# Patient Record
Sex: MALE | Race: WHITE | NOT HISPANIC OR LATINO
[De-identification: names, ages, dates, MRNs, and addresses within clinical notes are randomized per-mention and may not be internally consistent; named-entity substitution may affect disease eponyms.]

---

## 2017-01-31 ENCOUNTER — NON-APPOINTMENT (OUTPATIENT)
Age: 82
End: 2017-01-31

## 2017-01-31 ENCOUNTER — APPOINTMENT (OUTPATIENT)
Dept: CARDIOLOGY | Facility: CLINIC | Age: 82
End: 2017-01-31

## 2017-01-31 VITALS
BODY MASS INDEX: 28.53 KG/M2 | WEIGHT: 161 LBS | OXYGEN SATURATION: 96 % | HEIGHT: 63 IN | SYSTOLIC BLOOD PRESSURE: 108 MMHG | HEART RATE: 62 BPM | DIASTOLIC BLOOD PRESSURE: 70 MMHG

## 2017-04-24 ENCOUNTER — NON-APPOINTMENT (OUTPATIENT)
Age: 82
End: 2017-04-24

## 2017-04-24 ENCOUNTER — APPOINTMENT (OUTPATIENT)
Dept: CARDIOLOGY | Facility: CLINIC | Age: 82
End: 2017-04-24

## 2017-04-24 VITALS
SYSTOLIC BLOOD PRESSURE: 94 MMHG | HEIGHT: 63 IN | HEART RATE: 56 BPM | OXYGEN SATURATION: 97 % | DIASTOLIC BLOOD PRESSURE: 55 MMHG

## 2017-04-24 VITALS — DIASTOLIC BLOOD PRESSURE: 60 MMHG | SYSTOLIC BLOOD PRESSURE: 100 MMHG

## 2017-05-08 ENCOUNTER — OTHER (OUTPATIENT)
Age: 82
End: 2017-05-08

## 2017-05-08 ENCOUNTER — APPOINTMENT (OUTPATIENT)
Dept: CARDIOLOGY | Facility: CLINIC | Age: 82
End: 2017-05-08

## 2017-06-21 ENCOUNTER — RX RENEWAL (OUTPATIENT)
Age: 82
End: 2017-06-21

## 2017-07-24 ENCOUNTER — MEDICATION RENEWAL (OUTPATIENT)
Age: 82
End: 2017-07-24

## 2017-07-24 ENCOUNTER — RX RENEWAL (OUTPATIENT)
Age: 82
End: 2017-07-24

## 2017-08-10 ENCOUNTER — NON-APPOINTMENT (OUTPATIENT)
Age: 82
End: 2017-08-10

## 2017-08-10 ENCOUNTER — APPOINTMENT (OUTPATIENT)
Dept: CARDIOLOGY | Facility: CLINIC | Age: 82
End: 2017-08-10
Payer: MEDICARE

## 2017-08-10 VITALS
SYSTOLIC BLOOD PRESSURE: 89 MMHG | BODY MASS INDEX: 28.7 KG/M2 | WEIGHT: 162 LBS | HEART RATE: 64 BPM | DIASTOLIC BLOOD PRESSURE: 57 MMHG | HEIGHT: 63 IN | OXYGEN SATURATION: 96 %

## 2017-08-10 PROCEDURE — 93000 ELECTROCARDIOGRAM COMPLETE: CPT

## 2017-08-10 PROCEDURE — 99215 OFFICE O/P EST HI 40 MIN: CPT

## 2017-09-25 ENCOUNTER — APPOINTMENT (OUTPATIENT)
Dept: RADIOLOGY | Facility: CLINIC | Age: 82
End: 2017-09-25
Payer: MEDICARE

## 2017-09-25 ENCOUNTER — OUTPATIENT (OUTPATIENT)
Dept: OUTPATIENT SERVICES | Facility: HOSPITAL | Age: 82
LOS: 1 days | End: 2017-09-25
Payer: MEDICARE

## 2017-09-25 DIAGNOSIS — Z00.8 ENCOUNTER FOR OTHER GENERAL EXAMINATION: ICD-10-CM

## 2017-09-25 PROCEDURE — 71020: CPT | Mod: 26

## 2017-09-25 PROCEDURE — 71046 X-RAY EXAM CHEST 2 VIEWS: CPT

## 2017-10-04 ENCOUNTER — APPOINTMENT (OUTPATIENT)
Dept: CARDIOLOGY | Facility: CLINIC | Age: 82
End: 2017-10-04
Payer: MEDICARE

## 2017-10-04 ENCOUNTER — NON-APPOINTMENT (OUTPATIENT)
Age: 82
End: 2017-10-04

## 2017-10-04 VITALS
SYSTOLIC BLOOD PRESSURE: 101 MMHG | DIASTOLIC BLOOD PRESSURE: 63 MMHG | HEIGHT: 63 IN | HEART RATE: 69 BPM | BODY MASS INDEX: 26.58 KG/M2 | WEIGHT: 150 LBS

## 2017-10-04 PROCEDURE — 99215 OFFICE O/P EST HI 40 MIN: CPT

## 2017-10-04 PROCEDURE — 93000 ELECTROCARDIOGRAM COMPLETE: CPT

## 2017-10-30 ENCOUNTER — APPOINTMENT (OUTPATIENT)
Dept: CARDIOLOGY | Facility: CLINIC | Age: 82
End: 2017-10-30
Payer: MEDICARE

## 2017-10-30 VITALS
SYSTOLIC BLOOD PRESSURE: 96 MMHG | BODY MASS INDEX: 29.59 KG/M2 | DIASTOLIC BLOOD PRESSURE: 59 MMHG | OXYGEN SATURATION: 97 % | WEIGHT: 167 LBS | HEART RATE: 73 BPM | HEIGHT: 63 IN

## 2017-10-30 VITALS — SYSTOLIC BLOOD PRESSURE: 110 MMHG | DIASTOLIC BLOOD PRESSURE: 60 MMHG

## 2017-10-30 PROCEDURE — 99215 OFFICE O/P EST HI 40 MIN: CPT

## 2017-11-27 ENCOUNTER — NON-APPOINTMENT (OUTPATIENT)
Age: 82
End: 2017-11-27

## 2017-11-27 ENCOUNTER — APPOINTMENT (OUTPATIENT)
Dept: CARDIOLOGY | Facility: CLINIC | Age: 82
End: 2017-11-27
Payer: MEDICARE

## 2017-11-27 VITALS
WEIGHT: 165 LBS | HEIGHT: 63 IN | DIASTOLIC BLOOD PRESSURE: 62 MMHG | SYSTOLIC BLOOD PRESSURE: 100 MMHG | OXYGEN SATURATION: 97 % | HEART RATE: 66 BPM | BODY MASS INDEX: 29.23 KG/M2

## 2017-11-27 PROCEDURE — 93000 ELECTROCARDIOGRAM COMPLETE: CPT

## 2017-11-27 PROCEDURE — 99215 OFFICE O/P EST HI 40 MIN: CPT

## 2017-12-08 ENCOUNTER — APPOINTMENT (OUTPATIENT)
Dept: INTERNAL MEDICINE | Facility: CLINIC | Age: 82
End: 2017-12-08
Payer: MEDICARE

## 2017-12-08 VITALS
BODY MASS INDEX: 29.23 KG/M2 | WEIGHT: 165 LBS | RESPIRATION RATE: 14 BRPM | SYSTOLIC BLOOD PRESSURE: 120 MMHG | TEMPERATURE: 98 F | DIASTOLIC BLOOD PRESSURE: 50 MMHG | OXYGEN SATURATION: 96 % | HEIGHT: 63 IN | HEART RATE: 86 BPM

## 2017-12-08 DIAGNOSIS — I95.89 OTHER HYPOTENSION: ICD-10-CM

## 2017-12-08 PROCEDURE — 99214 OFFICE O/P EST MOD 30 MIN: CPT

## 2017-12-09 LAB
25(OH)D3 SERPL-MCNC: 28.4 NG/ML
ALBUMIN SERPL ELPH-MCNC: 3.9 G/DL
ALP BLD-CCNC: 115 U/L
ALT SERPL-CCNC: 10 U/L
ANION GAP SERPL CALC-SCNC: 15 MMOL/L
AST SERPL-CCNC: 18 U/L
BASOPHILS # BLD AUTO: 0.01 K/UL
BASOPHILS NFR BLD AUTO: 0.1 %
BILIRUB SERPL-MCNC: 0.5 MG/DL
BUN SERPL-MCNC: 37 MG/DL
CALCIUM SERPL-MCNC: 9.8 MG/DL
CHLORIDE SERPL-SCNC: 101 MMOL/L
CHOLEST SERPL-MCNC: 111 MG/DL
CHOLEST/HDLC SERPL: 2.5 RATIO
CO2 SERPL-SCNC: 25 MMOL/L
CREAT SERPL-MCNC: 1.42 MG/DL
EOSINOPHIL # BLD AUTO: 0.19 K/UL
EOSINOPHIL NFR BLD AUTO: 2.4
FERRITIN SERPL-MCNC: 32 NG/ML
FOLATE SERPL-MCNC: 11.5 NG/ML
GLUCOSE SERPL-MCNC: 94 MG/DL
HBA1C MFR BLD HPLC: 5.5 %
HCT VFR BLD CALC: 37.1 %
HDLC SERPL-MCNC: 44 MG/DL
HGB BLD-MCNC: 11.9 G/DL
IMM GRANULOCYTES NFR BLD AUTO: 0.1 %
IRON SATN MFR SERPL: 19 %
IRON SERPL-MCNC: 61 UG/DL
LDLC SERPL CALC-MCNC: 43 MG/DL
LYMPHOCYTES # BLD AUTO: 1.38 K/UL
LYMPHOCYTES NFR BLD AUTO: 17.1 %
MAN DIFF?: NORMAL
MCHC RBC-ENTMCNC: 30.3 PG
MCHC RBC-ENTMCNC: 32.1 GM/DL
MCV RBC AUTO: 94.4 FL
MONOCYTES # BLD AUTO: 0.91 K/UL
MONOCYTES NFR BLD AUTO: 11.3 %
NEUTROPHILS # BLD AUTO: 5.58 K/UL
NEUTROPHILS NFR BLD AUTO: 69 %
PLATELET # BLD AUTO: 307 K/UL
POTASSIUM SERPL-SCNC: 5 MMOL/L
PROT SERPL-MCNC: 7.3 G/DL
RBC # BLD: 3.93 M/UL
RBC # FLD: 15.2 %
SODIUM SERPL-SCNC: 141 MMOL/L
TIBC SERPL-MCNC: 328 UG/DL
TRIGL SERPL-MCNC: 121 MG/DL
TSH SERPL-ACNC: 2.52 UIU/ML
UIBC SERPL-MCNC: 267 UG/DL
VIT B12 SERPL-MCNC: 448 PG/ML
WBC # FLD AUTO: 8.08 K/UL

## 2018-01-26 ENCOUNTER — APPOINTMENT (OUTPATIENT)
Dept: CARDIOLOGY | Facility: CLINIC | Age: 83
End: 2018-01-26
Payer: MEDICARE

## 2018-01-26 ENCOUNTER — NON-APPOINTMENT (OUTPATIENT)
Age: 83
End: 2018-01-26

## 2018-01-26 VITALS
SYSTOLIC BLOOD PRESSURE: 117 MMHG | BODY MASS INDEX: 29.06 KG/M2 | OXYGEN SATURATION: 83 % | HEART RATE: 127 BPM | HEIGHT: 63 IN | DIASTOLIC BLOOD PRESSURE: 64 MMHG | WEIGHT: 164 LBS

## 2018-01-26 PROCEDURE — 99215 OFFICE O/P EST HI 40 MIN: CPT

## 2018-01-26 PROCEDURE — 93000 ELECTROCARDIOGRAM COMPLETE: CPT

## 2018-01-31 ENCOUNTER — APPOINTMENT (OUTPATIENT)
Dept: CARDIOLOGY | Facility: CLINIC | Age: 83
End: 2018-01-31
Payer: MEDICARE

## 2018-01-31 ENCOUNTER — NON-APPOINTMENT (OUTPATIENT)
Age: 83
End: 2018-01-31

## 2018-01-31 VITALS
OXYGEN SATURATION: 94 % | HEART RATE: 69 BPM | WEIGHT: 166 LBS | BODY MASS INDEX: 29.41 KG/M2 | DIASTOLIC BLOOD PRESSURE: 81 MMHG | HEIGHT: 63 IN | SYSTOLIC BLOOD PRESSURE: 138 MMHG

## 2018-01-31 PROCEDURE — 93000 ELECTROCARDIOGRAM COMPLETE: CPT

## 2018-01-31 PROCEDURE — 99214 OFFICE O/P EST MOD 30 MIN: CPT

## 2018-03-14 ENCOUNTER — APPOINTMENT (OUTPATIENT)
Dept: INTERNAL MEDICINE | Facility: CLINIC | Age: 83
End: 2018-03-14
Payer: MEDICARE

## 2018-03-14 VITALS
RESPIRATION RATE: 14 BRPM | DIASTOLIC BLOOD PRESSURE: 68 MMHG | BODY MASS INDEX: 29.41 KG/M2 | OXYGEN SATURATION: 98 % | HEIGHT: 63 IN | TEMPERATURE: 97.3 F | HEART RATE: 64 BPM | SYSTOLIC BLOOD PRESSURE: 104 MMHG | WEIGHT: 166 LBS

## 2018-03-14 PROCEDURE — G0009: CPT

## 2018-03-14 PROCEDURE — 99214 OFFICE O/P EST MOD 30 MIN: CPT | Mod: 25

## 2018-03-14 PROCEDURE — 90670 PCV13 VACCINE IM: CPT

## 2018-03-14 RX ORDER — ATORVASTATIN CALCIUM 40 MG/1
40 TABLET, FILM COATED ORAL DAILY
Qty: 90 | Refills: 3 | Status: DISCONTINUED | COMMUNITY
Start: 2017-07-24 | End: 2018-03-14

## 2018-03-15 LAB
ALBUMIN SERPL ELPH-MCNC: 3.7 G/DL
ALP BLD-CCNC: 91 U/L
ALT SERPL-CCNC: 8 U/L
ANION GAP SERPL CALC-SCNC: 14 MMOL/L
AST SERPL-CCNC: 13 U/L
BASOPHILS # BLD AUTO: 0 K/UL
BASOPHILS NFR BLD AUTO: 0 %
BILIRUB SERPL-MCNC: 0.5 MG/DL
BUN SERPL-MCNC: 35 MG/DL
CALCIUM SERPL-MCNC: 9.8 MG/DL
CHLORIDE SERPL-SCNC: 104 MMOL/L
CO2 SERPL-SCNC: 25 MMOL/L
CREAT SERPL-MCNC: 1.68 MG/DL
EOSINOPHIL # BLD AUTO: 0 K/UL
EOSINOPHIL NFR BLD AUTO: 0 %
FERRITIN SERPL-MCNC: 37 NG/ML
FOLATE SERPL-MCNC: 13.5 NG/ML
GLUCOSE SERPL-MCNC: 103 MG/DL
HCT VFR BLD CALC: 34.4 %
HGB BLD-MCNC: 11.3 G/DL
IMM GRANULOCYTES NFR BLD AUTO: 0.3 %
IRON SATN MFR SERPL: 18 %
IRON SERPL-MCNC: 57 UG/DL
LYMPHOCYTES # BLD AUTO: 1.38 K/UL
LYMPHOCYTES NFR BLD AUTO: 22 %
MAN DIFF?: NORMAL
MCHC RBC-ENTMCNC: 31.1 PG
MCHC RBC-ENTMCNC: 32.8 GM/DL
MCV RBC AUTO: 94.8 FL
MONOCYTES # BLD AUTO: 0.67 K/UL
MONOCYTES NFR BLD AUTO: 10.7 %
NEUTROPHILS # BLD AUTO: 4.2 K/UL
NEUTROPHILS NFR BLD AUTO: 67 %
PLATELET # BLD AUTO: 238 K/UL
POTASSIUM SERPL-SCNC: 4.8 MMOL/L
PROT SERPL-MCNC: 6.7 G/DL
RBC # BLD: 3.63 M/UL
RBC # FLD: 15.7 %
SODIUM SERPL-SCNC: 143 MMOL/L
TIBC SERPL-MCNC: 325 UG/DL
UIBC SERPL-MCNC: 268 UG/DL
VIT B12 SERPL-MCNC: 465 PG/ML
WBC # FLD AUTO: 6.27 K/UL

## 2018-05-22 ENCOUNTER — APPOINTMENT (OUTPATIENT)
Dept: CARDIOLOGY | Facility: CLINIC | Age: 83
End: 2018-05-22
Payer: MEDICARE

## 2018-05-22 ENCOUNTER — NON-APPOINTMENT (OUTPATIENT)
Age: 83
End: 2018-05-22

## 2018-05-22 VITALS
WEIGHT: 181 LBS | DIASTOLIC BLOOD PRESSURE: 70 MMHG | HEIGHT: 63 IN | SYSTOLIC BLOOD PRESSURE: 119 MMHG | OXYGEN SATURATION: 97 % | HEART RATE: 67 BPM | BODY MASS INDEX: 32.07 KG/M2

## 2018-05-22 PROCEDURE — 93000 ELECTROCARDIOGRAM COMPLETE: CPT

## 2018-05-22 PROCEDURE — 99215 OFFICE O/P EST HI 40 MIN: CPT

## 2018-06-13 ENCOUNTER — APPOINTMENT (OUTPATIENT)
Dept: INTERNAL MEDICINE | Facility: CLINIC | Age: 83
End: 2018-06-13
Payer: MEDICARE

## 2018-06-13 VITALS
TEMPERATURE: 98 F | HEART RATE: 72 BPM | WEIGHT: 170 LBS | RESPIRATION RATE: 14 BRPM | OXYGEN SATURATION: 96 % | HEIGHT: 63 IN | DIASTOLIC BLOOD PRESSURE: 70 MMHG | SYSTOLIC BLOOD PRESSURE: 130 MMHG | BODY MASS INDEX: 30.12 KG/M2

## 2018-06-13 DIAGNOSIS — M41.9 SCOLIOSIS, UNSPECIFIED: ICD-10-CM

## 2018-06-13 DIAGNOSIS — M48.00 SPINAL STENOSIS, SITE UNSPECIFIED: ICD-10-CM

## 2018-06-13 PROCEDURE — 99214 OFFICE O/P EST MOD 30 MIN: CPT

## 2018-06-13 NOTE — HEALTH RISK ASSESSMENT
[] : No [Two or more falls in past year] : Patient reported two or more falls in the past year [0] : 2) Feeling down, depressed, or hopeless: Not at all (0)

## 2018-06-13 NOTE — PHYSICAL EXAM
[No Acute Distress] : no acute distress [Well Nourished] : well nourished [Well Developed] : well developed [No Respiratory Distress] : no respiratory distress  [Clear to Auscultation] : lungs were clear to auscultation bilaterally [Normal Rate] : normal rate  [Regular Rhythm] : with a regular rhythm [Soft] : abdomen soft [Non Tender] : non-tender [Normal Affect] : the affect was normal [Normal Insight/Judgement] : insight and judgment were intact [de-identified] : 1-2+ edema of both LE's [de-identified] : abrasions to right hand and back of right forearm [de-identified] : sitting in wheelchair

## 2018-06-13 NOTE — HISTORY OF PRESENT ILLNESS
[Formal Caregiver] : formal caregiver [FreeTextEntry1] : BP check [de-identified] : Pt fell last week when he got up out of a chair to go to bed. He was turning to put on light and fell onto buttocks and right arm getting a rug burn. \par \par He has renal lesions and was last seen 4 yrs ago. Told no further monitoring needed. \par \par Has MCCARTY sometimes and can happen when he bends down. Has chronic leg edema. \par \par Has scoliosis and spinal stenosis with chronic pain.

## 2018-06-26 PROBLEM — I50.22 CHRONIC SYSTOLIC HEART FAILURE: Status: RESOLVED | Noted: 2018-06-26 | Resolved: 2018-06-26

## 2018-06-26 PROBLEM — N28.9 RENAL INSUFFICIENCY: Status: ACTIVE | Noted: 2018-06-26

## 2018-06-26 PROBLEM — J96.11 CHRONIC RESPIRATORY FAILURE WITH HYPOXIA: Status: RESOLVED | Noted: 2018-06-26 | Resolved: 2018-06-26

## 2018-06-26 RX ORDER — UBIDECARENONE/VIT E ACET 100MG-5
1000 CAPSULE ORAL
Refills: 0 | Status: ACTIVE | COMMUNITY

## 2018-07-02 ENCOUNTER — APPOINTMENT (OUTPATIENT)
Dept: CARDIOLOGY | Facility: CLINIC | Age: 83
End: 2018-07-02
Payer: MEDICARE

## 2018-07-02 ENCOUNTER — NON-APPOINTMENT (OUTPATIENT)
Age: 83
End: 2018-07-02

## 2018-07-02 VITALS
SYSTOLIC BLOOD PRESSURE: 103 MMHG | BODY MASS INDEX: 29.77 KG/M2 | WEIGHT: 168 LBS | OXYGEN SATURATION: 94 % | HEART RATE: 66 BPM | DIASTOLIC BLOOD PRESSURE: 66 MMHG | HEIGHT: 63 IN

## 2018-07-02 DIAGNOSIS — N28.9 DISORDER OF KIDNEY AND URETER, UNSPECIFIED: ICD-10-CM

## 2018-07-02 DIAGNOSIS — J96.11 CHRONIC RESPIRATORY FAILURE WITH HYPOXIA: ICD-10-CM

## 2018-07-02 DIAGNOSIS — I50.22 CHRONIC SYSTOLIC (CONGESTIVE) HEART FAILURE: ICD-10-CM

## 2018-07-02 PROCEDURE — 93000 ELECTROCARDIOGRAM COMPLETE: CPT

## 2018-07-02 PROCEDURE — 99214 OFFICE O/P EST MOD 30 MIN: CPT

## 2018-07-09 ENCOUNTER — APPOINTMENT (OUTPATIENT)
Dept: PULMONOLOGY | Facility: CLINIC | Age: 83
End: 2018-07-09
Payer: MEDICARE

## 2018-07-09 VITALS
SYSTOLIC BLOOD PRESSURE: 102 MMHG | OXYGEN SATURATION: 98 % | RESPIRATION RATE: 14 BRPM | DIASTOLIC BLOOD PRESSURE: 61 MMHG | WEIGHT: 165 LBS | HEIGHT: 63 IN | BODY MASS INDEX: 29.23 KG/M2 | HEART RATE: 75 BPM

## 2018-07-09 PROCEDURE — 99213 OFFICE O/P EST LOW 20 MIN: CPT

## 2018-07-12 ENCOUNTER — RX RENEWAL (OUTPATIENT)
Age: 83
End: 2018-07-12

## 2018-08-06 ENCOUNTER — APPOINTMENT (OUTPATIENT)
Dept: PULMONOLOGY | Facility: CLINIC | Age: 83
End: 2018-08-06

## 2018-08-13 ENCOUNTER — APPOINTMENT (OUTPATIENT)
Dept: PULMONOLOGY | Facility: CLINIC | Age: 83
End: 2018-08-13
Payer: MEDICARE

## 2018-08-13 VITALS
SYSTOLIC BLOOD PRESSURE: 119 MMHG | HEIGHT: 64 IN | BODY MASS INDEX: 28.17 KG/M2 | HEART RATE: 59 BPM | DIASTOLIC BLOOD PRESSURE: 70 MMHG | OXYGEN SATURATION: 99 % | WEIGHT: 165 LBS

## 2018-08-13 PROCEDURE — 94726 PLETHYSMOGRAPHY LUNG VOLUMES: CPT

## 2018-08-13 PROCEDURE — 94729 DIFFUSING CAPACITY: CPT

## 2018-08-13 PROCEDURE — 99213 OFFICE O/P EST LOW 20 MIN: CPT | Mod: 25

## 2018-08-13 PROCEDURE — 94010 BREATHING CAPACITY TEST: CPT

## 2018-08-23 ENCOUNTER — RECORD ABSTRACTING (OUTPATIENT)
Age: 83
End: 2018-08-23

## 2018-09-12 ENCOUNTER — APPOINTMENT (OUTPATIENT)
Dept: INTERNAL MEDICINE | Facility: CLINIC | Age: 83
End: 2018-09-12
Payer: MEDICARE

## 2018-09-12 VITALS
WEIGHT: 165 LBS | BODY MASS INDEX: 28.17 KG/M2 | OXYGEN SATURATION: 94 % | HEIGHT: 64 IN | HEART RATE: 67 BPM | SYSTOLIC BLOOD PRESSURE: 110 MMHG | DIASTOLIC BLOOD PRESSURE: 60 MMHG | RESPIRATION RATE: 14 BRPM | TEMPERATURE: 98 F

## 2018-09-12 PROCEDURE — 99214 OFFICE O/P EST MOD 30 MIN: CPT

## 2018-09-12 NOTE — HISTORY OF PRESENT ILLNESS
[FreeTextEntry1] : BP check and f/u [de-identified] : Here for follow up. Has not had anymore falls. \par c/o occasional pain in right out thigh from hip to knee. Using cream with ibuprofen which helps. His daughter gave it to him.He has used voltaren cream in the past and doesn't want that. His right leg gives out and collapses once in a while. \par c/o urge incontinence which has gotten worse over the last year.

## 2018-09-12 NOTE — PHYSICAL EXAM
[No Acute Distress] : no acute distress [Well Nourished] : well nourished [Well Developed] : well developed [No Respiratory Distress] : no respiratory distress  [Clear to Auscultation] : lungs were clear to auscultation bilaterally [Normal Rate] : normal rate  [Regular Rhythm] : with a regular rhythm [Soft] : abdomen soft [Non Tender] : non-tender [Normal Affect] : the affect was normal [Normal Insight/Judgement] : insight and judgment were intact [de-identified] : sitting in wheel chair [de-identified] : has oxygen [de-identified] : ankle edema L > R

## 2018-09-12 NOTE — REVIEW OF SYSTEMS
[Shortness Of Breath] : shortness of breath [Negative] : Heme/Lymph [FreeTextEntry9] : sitting in wheel chair

## 2018-09-13 DIAGNOSIS — R74.0 NONSPECIFIC ELEVATION OF LEVELS OF TRANSAMINASE AND LACTIC ACID DEHYDROGENASE [LDH]: ICD-10-CM

## 2018-09-13 LAB
25(OH)D3 SERPL-MCNC: 35 NG/ML
ALBUMIN SERPL ELPH-MCNC: 3.9 G/DL
ALP BLD-CCNC: 128 U/L
ALT SERPL-CCNC: 74 U/L
ANION GAP SERPL CALC-SCNC: 11 MMOL/L
AST SERPL-CCNC: 76 U/L
BASOPHILS # BLD AUTO: 0.01 K/UL
BASOPHILS NFR BLD AUTO: 0.1 %
BILIRUB SERPL-MCNC: 0.5 MG/DL
BUN SERPL-MCNC: 34 MG/DL
CALCIUM SERPL-MCNC: 9.3 MG/DL
CHLORIDE SERPL-SCNC: 105 MMOL/L
CHOLEST SERPL-MCNC: 93 MG/DL
CHOLEST/HDLC SERPL: 2.7 RATIO
CO2 SERPL-SCNC: 26 MMOL/L
CREAT SERPL-MCNC: 1.52 MG/DL
EOSINOPHIL # BLD AUTO: 0.06 K/UL
EOSINOPHIL NFR BLD AUTO: 0.7 %
FOLATE SERPL-MCNC: 10.8 NG/ML
GLUCOSE SERPL-MCNC: 108 MG/DL
HBA1C MFR BLD HPLC: 5.6 %
HCT VFR BLD CALC: 33.8 %
HDLC SERPL-MCNC: 34 MG/DL
HGB BLD-MCNC: 10.6 G/DL
IMM GRANULOCYTES NFR BLD AUTO: 0.2 %
LDLC SERPL CALC-MCNC: 34 MG/DL
LYMPHOCYTES # BLD AUTO: 1.29 K/UL
LYMPHOCYTES NFR BLD AUTO: 15.9 %
MAN DIFF?: NORMAL
MCHC RBC-ENTMCNC: 29.9 PG
MCHC RBC-ENTMCNC: 31.4 GM/DL
MCV RBC AUTO: 95.5 FL
MONOCYTES # BLD AUTO: 1.09 K/UL
MONOCYTES NFR BLD AUTO: 13.4 %
NEUTROPHILS # BLD AUTO: 5.65 K/UL
NEUTROPHILS NFR BLD AUTO: 69.7 %
PLATELET # BLD AUTO: 235 K/UL
POTASSIUM SERPL-SCNC: 5 MMOL/L
PROT SERPL-MCNC: 7 G/DL
RBC # BLD: 3.54 M/UL
RBC # FLD: 15.9 %
SODIUM SERPL-SCNC: 142 MMOL/L
TRIGL SERPL-MCNC: 126 MG/DL
TSH SERPL-ACNC: 3.08 UIU/ML
URATE SERPL-MCNC: 6.1 MG/DL
VIT B12 SERPL-MCNC: 591 PG/ML
WBC # FLD AUTO: 8.12 K/UL

## 2018-09-14 LAB
APPEARANCE: CLEAR
BILIRUBIN URINE: NEGATIVE
BLOOD URINE: NEGATIVE
COLOR: YELLOW
GLUCOSE QUALITATIVE U: NEGATIVE MG/DL
KETONES URINE: NEGATIVE
LEUKOCYTE ESTERASE URINE: NEGATIVE
NITRITE URINE: NEGATIVE
PH URINE: 6
PROTEIN URINE: NEGATIVE MG/DL
SPECIFIC GRAVITY URINE: 1.01
UROBILINOGEN URINE: NEGATIVE MG/DL

## 2018-09-16 LAB — BACTERIA UR CULT: NORMAL

## 2018-09-24 ENCOUNTER — NON-APPOINTMENT (OUTPATIENT)
Age: 83
End: 2018-09-24

## 2018-09-24 ENCOUNTER — APPOINTMENT (OUTPATIENT)
Dept: CARDIOLOGY | Facility: CLINIC | Age: 83
End: 2018-09-24
Payer: MEDICARE

## 2018-09-24 ENCOUNTER — APPOINTMENT (OUTPATIENT)
Dept: PULMONOLOGY | Facility: CLINIC | Age: 83
End: 2018-09-24
Payer: MEDICARE

## 2018-09-24 VITALS
DIASTOLIC BLOOD PRESSURE: 56 MMHG | RESPIRATION RATE: 16 BRPM | HEART RATE: 67 BPM | OXYGEN SATURATION: 95 % | SYSTOLIC BLOOD PRESSURE: 94 MMHG

## 2018-09-24 VITALS
HEIGHT: 64 IN | DIASTOLIC BLOOD PRESSURE: 74 MMHG | SYSTOLIC BLOOD PRESSURE: 121 MMHG | BODY MASS INDEX: 28.68 KG/M2 | HEART RATE: 61 BPM | WEIGHT: 168 LBS | OXYGEN SATURATION: 96 %

## 2018-09-24 DIAGNOSIS — R06.02 SHORTNESS OF BREATH: ICD-10-CM

## 2018-09-24 PROCEDURE — 99215 OFFICE O/P EST HI 40 MIN: CPT

## 2018-09-24 PROCEDURE — 99214 OFFICE O/P EST MOD 30 MIN: CPT | Mod: 25

## 2018-09-24 PROCEDURE — 93000 ELECTROCARDIOGRAM COMPLETE: CPT

## 2018-09-24 PROCEDURE — 94010 BREATHING CAPACITY TEST: CPT

## 2018-09-24 PROCEDURE — 71046 X-RAY EXAM CHEST 2 VIEWS: CPT

## 2018-10-22 ENCOUNTER — APPOINTMENT (OUTPATIENT)
Dept: PULMONOLOGY | Facility: CLINIC | Age: 83
End: 2018-10-22
Payer: MEDICARE

## 2018-10-22 VITALS
HEART RATE: 67 BPM | SYSTOLIC BLOOD PRESSURE: 110 MMHG | HEIGHT: 64 IN | RESPIRATION RATE: 16 BRPM | OXYGEN SATURATION: 96 % | BODY MASS INDEX: 28.68 KG/M2 | WEIGHT: 168 LBS | DIASTOLIC BLOOD PRESSURE: 65 MMHG

## 2018-10-22 PROCEDURE — 99213 OFFICE O/P EST LOW 20 MIN: CPT | Mod: 25

## 2018-10-22 PROCEDURE — 90662 IIV NO PRSV INCREASED AG IM: CPT

## 2018-10-22 PROCEDURE — 94010 BREATHING CAPACITY TEST: CPT

## 2018-10-22 PROCEDURE — G0008: CPT

## 2018-10-24 ENCOUNTER — APPOINTMENT (OUTPATIENT)
Dept: CARDIOLOGY | Facility: CLINIC | Age: 83
End: 2018-10-24
Payer: MEDICARE

## 2018-10-24 ENCOUNTER — NON-APPOINTMENT (OUTPATIENT)
Age: 83
End: 2018-10-24

## 2018-10-24 PROCEDURE — 99214 OFFICE O/P EST MOD 30 MIN: CPT

## 2018-10-24 PROCEDURE — 93000 ELECTROCARDIOGRAM COMPLETE: CPT

## 2018-10-29 ENCOUNTER — MEDICATION RENEWAL (OUTPATIENT)
Age: 83
End: 2018-10-29

## 2018-10-30 ENCOUNTER — RX RENEWAL (OUTPATIENT)
Age: 83
End: 2018-10-30

## 2018-10-31 ENCOUNTER — RX RENEWAL (OUTPATIENT)
Age: 83
End: 2018-10-31

## 2018-11-01 ENCOUNTER — APPOINTMENT (OUTPATIENT)
Dept: INTERNAL MEDICINE | Facility: CLINIC | Age: 83
End: 2018-11-01
Payer: MEDICARE

## 2018-11-01 VITALS
SYSTOLIC BLOOD PRESSURE: 120 MMHG | TEMPERATURE: 97.5 F | HEIGHT: 64 IN | OXYGEN SATURATION: 98 % | HEART RATE: 61 BPM | WEIGHT: 168 LBS | DIASTOLIC BLOOD PRESSURE: 70 MMHG | BODY MASS INDEX: 28.68 KG/M2 | RESPIRATION RATE: 16 BRPM

## 2018-11-01 VITALS — DIASTOLIC BLOOD PRESSURE: 76 MMHG | SYSTOLIC BLOOD PRESSURE: 110 MMHG

## 2018-11-01 DIAGNOSIS — Z00.00 ENCOUNTER FOR GENERAL ADULT MEDICAL EXAMINATION W/OUT ABNORMAL FINDINGS: ICD-10-CM

## 2018-11-01 DIAGNOSIS — R60.9 EDEMA, UNSPECIFIED: ICD-10-CM

## 2018-11-01 PROCEDURE — G0439: CPT

## 2018-11-01 RX ORDER — ATORVASTATIN CALCIUM 40 MG/1
40 TABLET, FILM COATED ORAL
Refills: 0 | Status: DISCONTINUED | COMMUNITY
End: 2018-11-01

## 2018-11-01 NOTE — HISTORY OF PRESENT ILLNESS
[FreeTextEntry1] : cpe [de-identified] : Pt is here for cpe.  His BP at home runs high and is good in the office.\par \par He is fasting today. \par \par Wears oxygen.

## 2018-11-01 NOTE — PHYSICAL EXAM
[No Acute Distress] : no acute distress [Well Nourished] : well nourished [Well Developed] : well developed [Well-Appearing] : well-appearing [Normal Sclera/Conjunctiva] : normal sclera/conjunctiva [PERRL] : pupils equal round and reactive to light [EOMI] : extraocular movements intact [Normal Outer Ear/Nose] : the outer ears and nose were normal in appearance [Normal Oropharynx] : the oropharynx was normal [No JVD] : no jugular venous distention [Supple] : supple [No Lymphadenopathy] : no lymphadenopathy [Thyroid Normal, No Nodules] : the thyroid was normal and there were no nodules present [No Respiratory Distress] : no respiratory distress  [Clear to Auscultation] : lungs were clear to auscultation bilaterally [No Accessory Muscle Use] : no accessory muscle use [Normal Rate] : normal rate  [Regular Rhythm] : with a regular rhythm [Normal S1, S2] : normal S1 and S2 [Soft] : abdomen soft [Non Tender] : non-tender [Normal Affect] : the affect was normal [Normal Insight/Judgement] : insight and judgment were intact [de-identified] : sitting in wheel chair [de-identified] : 1-2 + edema LE's [de-identified] : lesion on upper right scalp - scab with raised borders

## 2018-11-02 LAB
25(OH)D3 SERPL-MCNC: 34.8 NG/ML
ALBUMIN SERPL ELPH-MCNC: 4.3 G/DL
ALP BLD-CCNC: 117 U/L
ALT SERPL-CCNC: 11 U/L
ANION GAP SERPL CALC-SCNC: 12 MMOL/L
AST SERPL-CCNC: 19 U/L
BASOPHILS # BLD AUTO: 0.01 K/UL
BASOPHILS NFR BLD AUTO: 0.1 %
BILIRUB SERPL-MCNC: 0.6 MG/DL
BUN SERPL-MCNC: 33 MG/DL
CALCIUM SERPL-MCNC: 9.4 MG/DL
CHLORIDE SERPL-SCNC: 103 MMOL/L
CHOLEST SERPL-MCNC: 103 MG/DL
CHOLEST/HDLC SERPL: 2.6 RATIO
CO2 SERPL-SCNC: 28 MMOL/L
CREAT SERPL-MCNC: 1.53 MG/DL
EOSINOPHIL # BLD AUTO: 0.08 K/UL
EOSINOPHIL NFR BLD AUTO: 1.2 %
FERRITIN SERPL-MCNC: 33 NG/ML
FOLATE SERPL-MCNC: 13.7 NG/ML
GLUCOSE SERPL-MCNC: 102 MG/DL
HBA1C MFR BLD HPLC: 5.6 %
HBV SURFACE AB SER QL: NONREACTIVE
HBV SURFACE AG SER QL: NONREACTIVE
HCT VFR BLD CALC: 35.6 %
HCV AB SER QL: NONREACTIVE
HCV S/CO RATIO: 0.1 S/CO
HDLC SERPL-MCNC: 39 MG/DL
HGB BLD-MCNC: 11.3 G/DL
IMM GRANULOCYTES NFR BLD AUTO: 0.3 %
LDLC SERPL CALC-MCNC: 43 MG/DL
LYMPHOCYTES # BLD AUTO: 1.45 K/UL
LYMPHOCYTES NFR BLD AUTO: 21.7 %
MAN DIFF?: NORMAL
MCHC RBC-ENTMCNC: 30.5 PG
MCHC RBC-ENTMCNC: 31.7 GM/DL
MCV RBC AUTO: 96.2 FL
MONOCYTES # BLD AUTO: 0.82 K/UL
MONOCYTES NFR BLD AUTO: 12.3 %
NEUTROPHILS # BLD AUTO: 4.31 K/UL
NEUTROPHILS NFR BLD AUTO: 64.4 %
PLATELET # BLD AUTO: 222 K/UL
POTASSIUM SERPL-SCNC: 5 MMOL/L
PROT SERPL-MCNC: 7.2 G/DL
RBC # BLD: 3.7 M/UL
RBC # BLD: 3.7 M/UL
RBC # FLD: 15 %
RETICS # AUTO: 1.8 %
RETICS AGGREG/RBC NFR: 66.6 K/UL
SODIUM SERPL-SCNC: 143 MMOL/L
TRIGL SERPL-MCNC: 107 MG/DL
TSH SERPL-ACNC: 3.21 UIU/ML
URATE SERPL-MCNC: 5.6 MG/DL
VIT B12 SERPL-MCNC: 479 PG/ML
WBC # FLD AUTO: 6.69 K/UL

## 2018-11-26 ENCOUNTER — APPOINTMENT (OUTPATIENT)
Dept: PULMONOLOGY | Facility: CLINIC | Age: 83
End: 2018-11-26
Payer: MEDICARE

## 2018-11-26 VITALS — SYSTOLIC BLOOD PRESSURE: 105 MMHG | DIASTOLIC BLOOD PRESSURE: 64 MMHG

## 2018-11-26 VITALS
HEART RATE: 76 BPM | BODY MASS INDEX: 28.17 KG/M2 | DIASTOLIC BLOOD PRESSURE: 60 MMHG | WEIGHT: 165 LBS | SYSTOLIC BLOOD PRESSURE: 94 MMHG | OXYGEN SATURATION: 98 % | HEIGHT: 64 IN | RESPIRATION RATE: 14 BRPM

## 2018-11-26 PROCEDURE — 99213 OFFICE O/P EST LOW 20 MIN: CPT | Mod: 25

## 2018-11-26 PROCEDURE — 94010 BREATHING CAPACITY TEST: CPT

## 2018-12-17 ENCOUNTER — RX RENEWAL (OUTPATIENT)
Age: 83
End: 2018-12-17

## 2018-12-18 ENCOUNTER — RX RENEWAL (OUTPATIENT)
Age: 83
End: 2018-12-18

## 2019-01-01 ENCOUNTER — RX RENEWAL (OUTPATIENT)
Age: 84
End: 2019-01-01

## 2019-01-01 ENCOUNTER — APPOINTMENT (OUTPATIENT)
Dept: INTERNAL MEDICINE | Facility: CLINIC | Age: 84
End: 2019-01-01
Payer: MEDICARE

## 2019-01-01 ENCOUNTER — APPOINTMENT (OUTPATIENT)
Dept: DERMATOLOGY | Facility: CLINIC | Age: 84
End: 2019-01-01
Payer: MEDICARE

## 2019-01-01 ENCOUNTER — APPOINTMENT (OUTPATIENT)
Dept: PULMONOLOGY | Facility: CLINIC | Age: 84
End: 2019-01-01
Payer: MEDICARE

## 2019-01-01 ENCOUNTER — APPOINTMENT (OUTPATIENT)
Dept: CARDIOLOGY | Facility: CLINIC | Age: 84
End: 2019-01-01
Payer: MEDICARE

## 2019-01-01 ENCOUNTER — NON-APPOINTMENT (OUTPATIENT)
Age: 84
End: 2019-01-01

## 2019-01-01 ENCOUNTER — APPOINTMENT (OUTPATIENT)
Dept: INTERNAL MEDICINE | Facility: CLINIC | Age: 84
End: 2019-01-01

## 2019-01-01 ENCOUNTER — APPOINTMENT (OUTPATIENT)
Dept: DERMATOLOGY | Facility: CLINIC | Age: 84
End: 2019-01-01

## 2019-01-01 ENCOUNTER — TRANSCRIPTION ENCOUNTER (OUTPATIENT)
Age: 84
End: 2019-01-01

## 2019-01-01 VITALS
HEART RATE: 70 BPM | DIASTOLIC BLOOD PRESSURE: 80 MMHG | RESPIRATION RATE: 14 BRPM | WEIGHT: 160 LBS | SYSTOLIC BLOOD PRESSURE: 130 MMHG | OXYGEN SATURATION: 94 % | HEIGHT: 65 IN | BODY MASS INDEX: 26.66 KG/M2 | TEMPERATURE: 97.1 F

## 2019-01-01 VITALS
DIASTOLIC BLOOD PRESSURE: 67 MMHG | SYSTOLIC BLOOD PRESSURE: 117 MMHG | HEIGHT: 65 IN | OXYGEN SATURATION: 97 % | TEMPERATURE: 98.3 F | HEART RATE: 61 BPM | WEIGHT: 160 LBS | BODY MASS INDEX: 26.66 KG/M2 | RESPIRATION RATE: 16 BRPM

## 2019-01-01 VITALS
HEIGHT: 64 IN | SYSTOLIC BLOOD PRESSURE: 153 MMHG | HEART RATE: 63 BPM | OXYGEN SATURATION: 100 % | WEIGHT: 160 LBS | BODY MASS INDEX: 27.31 KG/M2 | DIASTOLIC BLOOD PRESSURE: 78 MMHG

## 2019-01-01 VITALS
DIASTOLIC BLOOD PRESSURE: 54 MMHG | SYSTOLIC BLOOD PRESSURE: 100 MMHG | BODY MASS INDEX: 25.71 KG/M2 | WEIGHT: 160 LBS | HEIGHT: 66 IN

## 2019-01-01 VITALS — BODY MASS INDEX: 25.71 KG/M2 | HEIGHT: 66 IN | WEIGHT: 160 LBS

## 2019-01-01 VITALS
WEIGHT: 160 LBS | SYSTOLIC BLOOD PRESSURE: 118 MMHG | HEIGHT: 64 IN | TEMPERATURE: 97.5 F | BODY MASS INDEX: 27.31 KG/M2 | OXYGEN SATURATION: 95 % | DIASTOLIC BLOOD PRESSURE: 70 MMHG | RESPIRATION RATE: 14 BRPM | HEART RATE: 63 BPM

## 2019-01-01 VITALS
TEMPERATURE: 97.5 F | HEART RATE: 67 BPM | DIASTOLIC BLOOD PRESSURE: 60 MMHG | OXYGEN SATURATION: 95 % | SYSTOLIC BLOOD PRESSURE: 109 MMHG

## 2019-01-01 VITALS
OXYGEN SATURATION: 99 % | HEART RATE: 69 BPM | SYSTOLIC BLOOD PRESSURE: 101 MMHG | DIASTOLIC BLOOD PRESSURE: 60 MMHG | RESPIRATION RATE: 16 BRPM

## 2019-01-01 VITALS — SYSTOLIC BLOOD PRESSURE: 120 MMHG | DIASTOLIC BLOOD PRESSURE: 65 MMHG

## 2019-01-01 DIAGNOSIS — N18.3 CHRONIC KIDNEY DISEASE, STAGE 3 (MODERATE): Chronic | ICD-10-CM

## 2019-01-01 DIAGNOSIS — B35.9 DERMATOPHYTOSIS, UNSPECIFIED: ICD-10-CM

## 2019-01-01 DIAGNOSIS — B35.1 TINEA UNGUIUM: ICD-10-CM

## 2019-01-01 DIAGNOSIS — Z23 ENCOUNTER FOR IMMUNIZATION: ICD-10-CM

## 2019-01-01 DIAGNOSIS — E78.00 PURE HYPERCHOLESTEROLEMIA, UNSPECIFIED: ICD-10-CM

## 2019-01-01 LAB
ALBUMIN SERPL ELPH-MCNC: 3.9 G/DL
ALP BLD-CCNC: 94 U/L
ALT SERPL-CCNC: 6 U/L
ANION GAP SERPL CALC-SCNC: 11 MMOL/L
AST SERPL-CCNC: 11 U/L
BASOPHILS # BLD AUTO: 0.02 K/UL
BASOPHILS NFR BLD AUTO: 0.3 %
BILIRUB SERPL-MCNC: 0.4 MG/DL
BUN SERPL-MCNC: 37 MG/DL
CALCIUM SERPL-MCNC: 9.4 MG/DL
CHLORIDE SERPL-SCNC: 103 MMOL/L
CHOLEST SERPL-MCNC: 88 MG/DL
CHOLEST/HDLC SERPL: 2.2 RATIO
CO2 SERPL-SCNC: 27 MMOL/L
CREAT SERPL-MCNC: 1.56 MG/DL
EOSINOPHIL # BLD AUTO: 0.1 K/UL
EOSINOPHIL NFR BLD AUTO: 1.4 %
ESTIMATED AVERAGE GLUCOSE: 117 MG/DL
FERRITIN SERPL-MCNC: 23 NG/ML
GLUCOSE SERPL-MCNC: 102 MG/DL
HBA1C MFR BLD HPLC: 5.7 %
HCT VFR BLD CALC: 30.9 %
HDLC SERPL-MCNC: 40 MG/DL
HGB BLD-MCNC: 9.4 G/DL
IMM GRANULOCYTES NFR BLD AUTO: 0.7 %
IRON SATN MFR SERPL: 10 %
IRON SERPL-MCNC: 35 UG/DL
LDLC SERPL CALC-MCNC: 27 MG/DL
LYMPHOCYTES # BLD AUTO: 1.35 K/UL
LYMPHOCYTES NFR BLD AUTO: 18.5 %
MAN DIFF?: NORMAL
MCHC RBC-ENTMCNC: 28.7 PG
MCHC RBC-ENTMCNC: 30.4 GM/DL
MCV RBC AUTO: 94.5 FL
MONOCYTES # BLD AUTO: 1.11 K/UL
MONOCYTES NFR BLD AUTO: 15.2 %
NEUTROPHILS # BLD AUTO: 4.67 K/UL
NEUTROPHILS NFR BLD AUTO: 63.9 %
PLATELET # BLD AUTO: 213 K/UL
POTASSIUM SERPL-SCNC: 4.8 MMOL/L
PROT SERPL-MCNC: 6.3 G/DL
RBC # BLD: 3.18 M/UL
RBC # BLD: 3.27 M/UL
RBC # FLD: 16.2 %
RETICS # AUTO: 2 %
RETICS AGGREG/RBC NFR: 63.9 K/UL
SODIUM SERPL-SCNC: 141 MMOL/L
TIBC SERPL-MCNC: 348 UG/DL
TRIGL SERPL-MCNC: 105 MG/DL
UIBC SERPL-MCNC: 313 UG/DL
WBC # FLD AUTO: 7.3 K/UL

## 2019-01-01 PROCEDURE — 99214 OFFICE O/P EST MOD 30 MIN: CPT | Mod: 25

## 2019-01-01 PROCEDURE — 99214 OFFICE O/P EST MOD 30 MIN: CPT

## 2019-01-01 PROCEDURE — 94060 EVALUATION OF WHEEZING: CPT

## 2019-01-01 PROCEDURE — G0008: CPT

## 2019-01-01 PROCEDURE — 71046 X-RAY EXAM CHEST 2 VIEWS: CPT

## 2019-01-01 PROCEDURE — 94010 BREATHING CAPACITY TEST: CPT

## 2019-01-01 PROCEDURE — 93000 ELECTROCARDIOGRAM COMPLETE: CPT

## 2019-01-01 PROCEDURE — 99203 OFFICE O/P NEW LOW 30 MIN: CPT

## 2019-01-01 PROCEDURE — 94799 UNLISTED PULMONARY SVC/PX: CPT

## 2019-01-01 PROCEDURE — 90662 IIV NO PRSV INCREASED AG IM: CPT

## 2019-01-01 PROCEDURE — 94400: CPT

## 2019-01-01 RX ORDER — METHYLPREDNISOLONE 4 MG/1
4 TABLET ORAL
Qty: 1 | Refills: 0 | Status: DISCONTINUED | COMMUNITY
Start: 2019-01-01 | End: 2019-01-01

## 2019-01-01 RX ORDER — AZITHROMYCIN 250 MG/1
250 TABLET, FILM COATED ORAL
Qty: 6 | Refills: 1 | Status: DISCONTINUED | COMMUNITY
Start: 2019-01-01 | End: 2019-01-01

## 2019-01-01 RX ORDER — PREDNISONE 10 MG/1
10 TABLET ORAL
Qty: 12 | Refills: 0 | Status: DISCONTINUED | COMMUNITY
Start: 2019-01-01 | End: 2019-01-01

## 2019-01-07 ENCOUNTER — APPOINTMENT (OUTPATIENT)
Dept: INTERNAL MEDICINE | Facility: CLINIC | Age: 84
End: 2019-01-07

## 2019-01-09 ENCOUNTER — APPOINTMENT (OUTPATIENT)
Dept: INTERNAL MEDICINE | Facility: CLINIC | Age: 84
End: 2019-01-09
Payer: MEDICARE

## 2019-01-09 VITALS
HEIGHT: 64 IN | TEMPERATURE: 97.6 F | DIASTOLIC BLOOD PRESSURE: 64 MMHG | SYSTOLIC BLOOD PRESSURE: 106 MMHG | OXYGEN SATURATION: 95 % | BODY MASS INDEX: 27.66 KG/M2 | WEIGHT: 162 LBS | HEART RATE: 58 BPM | RESPIRATION RATE: 16 BRPM

## 2019-01-09 PROCEDURE — 99214 OFFICE O/P EST MOD 30 MIN: CPT

## 2019-01-09 NOTE — PHYSICAL EXAM
[No Acute Distress] : no acute distress [Well Nourished] : well nourished [Well Developed] : well developed [No Respiratory Distress] : no respiratory distress  [Clear to Auscultation] : lungs were clear to auscultation bilaterally [Normal Rate] : normal rate  [Regular Rhythm] : with a regular rhythm [Soft] : abdomen soft [Non Tender] : non-tender [Normal Affect] : the affect was normal [Normal Insight/Judgement] : insight and judgment were intact [de-identified] : 1+ edema of LE's [de-identified] : right fingernails with thickening and flaking [de-identified] : sitting in wheel chair

## 2019-02-04 ENCOUNTER — RX RENEWAL (OUTPATIENT)
Age: 84
End: 2019-02-04

## 2019-02-11 ENCOUNTER — APPOINTMENT (OUTPATIENT)
Dept: PULMONOLOGY | Facility: CLINIC | Age: 84
End: 2019-02-11
Payer: MEDICARE

## 2019-02-11 VITALS
BODY MASS INDEX: 27.66 KG/M2 | DIASTOLIC BLOOD PRESSURE: 46 MMHG | OXYGEN SATURATION: 97 % | HEART RATE: 75 BPM | RESPIRATION RATE: 14 BRPM | WEIGHT: 162 LBS | HEIGHT: 64 IN | SYSTOLIC BLOOD PRESSURE: 84 MMHG

## 2019-02-11 PROCEDURE — 94010 BREATHING CAPACITY TEST: CPT

## 2019-02-11 PROCEDURE — 71045 X-RAY EXAM CHEST 1 VIEW: CPT

## 2019-02-11 PROCEDURE — 99214 OFFICE O/P EST MOD 30 MIN: CPT | Mod: 25

## 2019-02-11 NOTE — PHYSICAL EXAM
[General Appearance - Well Developed] : well developed [Well Groomed] : well groomed [General Appearance - Well Nourished] : well nourished [General Appearance - In No Acute Distress] : no acute distress [Normal Conjunctiva] : the conjunctiva exhibited no abnormalities [Normal Oropharynx] : normal oropharynx [Neck Appearance] : the appearance of the neck was normal [Neck Cervical Mass (___cm)] : no neck mass was observed [Jugular Venous Distention Increased] : there was no jugular-venous distention [Heart Rate And Rhythm] : heart rate and rhythm were normal [Heart Sounds] : normal S1 and S2 [Murmurs] : no murmurs present [Respiration, Rhythm And Depth] : normal respiratory rhythm and effort [Exaggerated Use Of Accessory Muscles For Inspiration] : no accessory muscle use [Chest Palpation] : palpation of the chest revealed no abnormalities [Lungs Percussion] : the lungs were normal to percussion [Bowel Sounds] : normal bowel sounds [Abdomen Soft] : soft [Abdomen Tenderness] : non-tender [Abdomen Mass (___ Cm)] : no abdominal mass palpated [Nail Clubbing] : no clubbing of the fingernails [Cyanosis, Localized] : no localized cyanosis [1+ Pitting] : 1+  pitting [Skin Color & Pigmentation] : normal skin color and pigmentation [] : no rash [No Venous Stasis] : no venous stasis [Skin Lesions] : no skin lesions [No Skin Ulcers] : no skin ulcer [No Xanthoma] : no  xanthoma was observed [Oriented To Time, Place, And Person] : oriented to person, place, and time [Impaired Insight] : insight and judgment were intact [Affect] : the affect was normal [FreeTextEntry1] : Coarse BS with mild prolonged expiratory phase

## 2019-02-11 NOTE — HISTORY OF PRESENT ILLNESS
[None] : ~He/She~ has no significant interval events [Difficulty Breathing During Exertion] : stable dyspnea on exertion [Feelings Of Weakness On Exertion] : stable exercise intolerance [Cough] : denies coughing [Wheezing] : denies wheezing [Regional Soft Tissue Swelling Both Lower Extremities] : stable lower extremity edema [Chest Pain Or Discomfort] : denies chest pain [Fever] : denies fever [Oxygen] : the patient uses supplemental oxygen [Oxygen Rate  ___ lpm] : Oxygen rate is [unfilled] lpm [NC] : Nasal Cannula [24 hrs] : 24 hours/day [Former] : is a former smoker [Wt Gain ___ Lbs] : no recent weight gain [Wt Loss ___ Lbs] : no recent weight loss [FreeTextEntry1] : Voice change -issue budesonide\par \par s/p increased  Treated with antibx and steroids for Bronchitic  event\par

## 2019-02-11 NOTE — PROCEDURE
[FreeTextEntry1] : Spirometry 2/11/2019\par No BD Given\par  Mild reduction flow rates\par  No decline compared to prior data\par \par \par Chest x-ray AP View 2/11/2019\par Cardiomegaly\par No Infiltrates\par Small lung volunmes\par  sutures noted\par IM FLU Vaccine left upper extremity Oct 22 2018\par \par Noted PMD labs mild renal Insuff with creat 1.53\par Repeat systolic BP 97

## 2019-02-11 NOTE — REVIEW OF SYSTEMS
[As Noted in HPI] : as noted in HPI [Edema] : ~T edema was present [Heartburn] : heartburn [Negative] : Endocrine [Hypotension] : no hypotension [Chest Discomfort] : no chest discomfort [PND] : no PND [Palpitations] : no palpitations [Dysphagia] : no dysphagia [Nausea] : no nausea [Vomiting] : no vomiting [Constipation] : no constipation [Diarrhea] : no diarrhea [Abdominal Pain] : no abdominal pain [FreeTextEntry9] : CHF ASHD Diastolic dysfx HLD [de-identified] : Mild Pulmonary HTN

## 2019-02-11 NOTE — DISCUSSION/SUMMARY
[FreeTextEntry1] : COPD \par Chronic Respiratory Failure on long term O2\par s/p treatment Bronchitic  event\par Hold Budenoside \par Continue Neb treatments with albuterol and ipratropium\par recheck 1  month\par consider ENT\par Patient on long-term oxygen therapy with clinical benefit and improved well-being.  Continue long-term oxygen care as ordered\par f/u cardiology and call with borderline low BP

## 2019-02-12 ENCOUNTER — OTHER (OUTPATIENT)
Age: 84
End: 2019-02-12

## 2019-02-16 ENCOUNTER — EMERGENCY (EMERGENCY)
Facility: HOSPITAL | Age: 84
LOS: 1 days | Discharge: HOME | End: 2019-02-16
Attending: EMERGENCY MEDICINE | Admitting: EMERGENCY MEDICINE
Payer: MEDICARE

## 2019-02-16 VITALS
HEIGHT: 66 IN | RESPIRATION RATE: 18 BRPM | SYSTOLIC BLOOD PRESSURE: 101 MMHG | DIASTOLIC BLOOD PRESSURE: 61 MMHG | TEMPERATURE: 97 F | WEIGHT: 162.04 LBS | OXYGEN SATURATION: 97 % | HEART RATE: 73 BPM

## 2019-02-16 VITALS
RESPIRATION RATE: 16 BRPM | SYSTOLIC BLOOD PRESSURE: 120 MMHG | OXYGEN SATURATION: 98 % | HEART RATE: 72 BPM | TEMPERATURE: 97 F | DIASTOLIC BLOOD PRESSURE: 72 MMHG

## 2019-02-16 PROCEDURE — 73590 X-RAY EXAM OF LOWER LEG: CPT | Mod: 26,RT

## 2019-02-16 PROCEDURE — 99284 EMERGENCY DEPT VISIT MOD MDM: CPT

## 2019-02-16 PROCEDURE — 73080 X-RAY EXAM OF ELBOW: CPT

## 2019-02-16 PROCEDURE — 73590 X-RAY EXAM OF LOWER LEG: CPT

## 2019-02-16 PROCEDURE — 99284 EMERGENCY DEPT VISIT MOD MDM: CPT | Mod: 25

## 2019-02-16 PROCEDURE — 72220 X-RAY EXAM SACRUM TAILBONE: CPT | Mod: 26

## 2019-02-16 PROCEDURE — 72100 X-RAY EXAM L-S SPINE 2/3 VWS: CPT | Mod: 26

## 2019-02-16 PROCEDURE — 73080 X-RAY EXAM OF ELBOW: CPT | Mod: 26,LT

## 2019-02-16 PROCEDURE — 72220 X-RAY EXAM SACRUM TAILBONE: CPT

## 2019-02-16 PROCEDURE — 73610 X-RAY EXAM OF ANKLE: CPT | Mod: 26,RT

## 2019-02-16 PROCEDURE — 72100 X-RAY EXAM L-S SPINE 2/3 VWS: CPT

## 2019-02-16 PROCEDURE — 73610 X-RAY EXAM OF ANKLE: CPT

## 2019-02-16 RX ORDER — ACETAMINOPHEN 500 MG
650 TABLET ORAL ONCE
Qty: 0 | Refills: 0 | Status: COMPLETED | OUTPATIENT
Start: 2019-02-16 | End: 2019-02-16

## 2019-02-16 RX ADMIN — Medication 650 MILLIGRAM(S): at 13:08

## 2019-02-16 RX ADMIN — Medication 650 MILLIGRAM(S): at 14:08

## 2019-02-16 NOTE — ED PROVIDER NOTE - CLINICAL SUMMARY MEDICAL DECISION MAKING FREE TEXT BOX
Pt examined, xrays elbow left, right tib/fib, right ankle ordered.  Tylenol ordered.  Will re eval. Pt examined, xrays elbow left, right tib/fib, right ankle ordered.  Tylenol ordered.  Will re eval.  Discussed results with pt and daughters.  Pt was able to ambulate with walker with ambulation trial.

## 2019-02-16 NOTE — ED PROVIDER NOTE - NSFOLLOWUPINSTRUCTIONS_ED_ALL_ED_FT
Rest, ice and elevate right leg.  Use Tylenol or Motrin for pain as directed.  Use walker to ambulate.  Fall precautions to be followed at home.  Discussed with daughters also.  F/U with Dr. Hoffman on Wednesday.  Return to ED if symptoms worsen or persist.

## 2019-02-16 NOTE — ED PROVIDER NOTE - CARE PROVIDER_API CALL
Nida Hoffman)  Internal Medicine  [address] [place] [address]  Phone: [phone]  Fax: [phone]  Follow Up Time:

## 2019-02-16 NOTE — ED ADULT NURSE NOTE - OBJECTIVE STATEMENT
Presents to ER S/P fall this AM. Pt states his leg gave out and he fell in the bathroom. Now c/o hip and shin pain.

## 2019-02-16 NOTE — ED PROVIDER NOTE - ATTENDING CONTRIBUTION TO CARE
89 yo male BIBEMS s/p fall at home, states his right leg gave out while attempting to sit on a chair, fell onto buttocks, no head injury, no neck pain, no syncope, no dizziness, no LOC.  Patient c/o right lateral tib/fib pain in lower leg and ankle.  Also c/o left elbow pain.  Denies any hip/femur pain.  No chest pain, no shortness of breath    patient alert, awake, no acute distress, ncat, rrr, cta b/l, abd soft, nt, nd, FROM in right hip/knee,ankle/foot.  FROM in b/l elbow, +mild ttp left elbow and right lateral tib/fib.  NO deformity.      Xray's negative for acute fx, possible indeterminate coccyx fx, ambulating well with walker in ED.

## 2019-02-16 NOTE — ED ADULT NURSE NOTE - NSIMPLEMENTINTERV_GEN_ALL_ED
Implemented All Fall with Harm Risk Interventions:  [place] to call system. Call bell, personal items and telephone within reach. Instruct patient to call for assistance. Room bathroom lighting operational. Non-slip footwear when patient is off stretcher. Physically safe environment: no spills, clutter or unnecessary equipment. Stretcher in lowest position, wheels locked, appropriate side rails in place. Provide visual cue, wrist band, yellow gown, etc. Monitor gait and stability. Monitor for mental status changes and reorient to person, place, and time. Review medications for side effects contributing to fall risk. Reinforce activity limits and safety measures with patient and family. Provide visual clues: red socks.

## 2019-02-16 NOTE — ED PROVIDER NOTE - OBJECTIVE STATEMENT
Pt is a 89y/o male BIBA s/p fall at home.  Pt states he was attempting to sit on a chair when he fell to buttocks.  Pt denies head injury, no lightheadedness or dizziness, no LOC.   Pt c/o right LE pain lower leg and ankle and left elbow.  Pt denies hip pain, no back pain.  Daughters at bedside.

## 2019-02-16 NOTE — ED PROVIDER NOTE - CARE PLAN
Principal Discharge DX:	Ankle injury, right, initial encounter  Secondary Diagnosis:	Elbow injury, left, initial encounter  Secondary Diagnosis:	Leg injury, right, initial encounter

## 2019-02-16 NOTE — ED ADULT TRIAGE NOTE - CHIEF COMPLAINT QUOTE
Pt states" I missed the chair while attempting sit, my right leg gave way and a fell, I have right hip and knee pain"

## 2019-03-11 ENCOUNTER — APPOINTMENT (OUTPATIENT)
Dept: PULMONOLOGY | Facility: CLINIC | Age: 84
End: 2019-03-11
Payer: MEDICARE

## 2019-03-11 VITALS
RESPIRATION RATE: 14 BRPM | DIASTOLIC BLOOD PRESSURE: 61 MMHG | OXYGEN SATURATION: 97 % | HEART RATE: 71 BPM | SYSTOLIC BLOOD PRESSURE: 96 MMHG

## 2019-03-11 PROCEDURE — 94060 EVALUATION OF WHEEZING: CPT

## 2019-03-11 PROCEDURE — 99214 OFFICE O/P EST MOD 30 MIN: CPT | Mod: 25

## 2019-03-11 RX ORDER — METHYLPREDNISOLONE 4 MG/1
4 TABLET ORAL
Qty: 1 | Refills: 0 | Status: DISCONTINUED | COMMUNITY
Start: 2019-02-04 | End: 2019-03-11

## 2019-03-11 RX ORDER — AZITHROMYCIN 250 MG/1
250 TABLET, FILM COATED ORAL
Qty: 1 | Refills: 0 | Status: DISCONTINUED | COMMUNITY
Start: 2019-02-04 | End: 2019-03-11

## 2019-03-11 NOTE — REVIEW OF SYSTEMS
[As Noted in HPI] : as noted in HPI [Edema] : ~T edema was present [Heartburn] : heartburn [Negative] : Endocrine [Hypotension] : no hypotension [Chest Discomfort] : no chest discomfort [PND] : no PND [Palpitations] : no palpitations [Dysphagia] : no dysphagia [Nausea] : no nausea [Vomiting] : no vomiting [Constipation] : no constipation [Diarrhea] : no diarrhea [Abdominal Pain] : no abdominal pain [FreeTextEntry9] : CHF ASHD Diastolic dysfx HLD [de-identified] : Mild Pulmonary HTN

## 2019-03-11 NOTE — PROCEDURE
[FreeTextEntry1] : Spirometry 2/11/2019\par No BD Given\par  Mild reduction flow rates\par  No decline compared to prior data\par \par \par Chest x-ray AP View 2/11/2019\par Cardiomegaly\par No Infiltrates\par Small lung volume\par  sutures noted\par IM FLU Vaccine left upper extremity Oct 22 2018\par \par Noted PMD labs mild renal Insuff with creat 1.53\par

## 2019-03-11 NOTE — DISCUSSION/SUMMARY
[FreeTextEntry1] : COPD\par  ILD\par Hypoxemic respiratory failure\par Pulmonary HTN\par Lower extremity edema\par Borderline Low BP\par ASHD\par Hold budesonide b/c of voice which has improved\par continue Albuterol\par Patient on long-term oxygen therapy with clinical benefit and improved well-being.  Continue long-term oxygen care as ordered\par \par 1 mos f/u

## 2019-03-11 NOTE — HISTORY OF PRESENT ILLNESS
[None] : ~He/She~ has no significant interval events [Difficulty Breathing During Exertion] : stable dyspnea on exertion [Feelings Of Weakness On Exertion] : stable exercise intolerance [Cough] : denies coughing [Wheezing] : denies wheezing [Regional Soft Tissue Swelling Both Lower Extremities] : stable lower extremity edema [Chest Pain Or Discomfort] : denies chest pain [Fever] : denies fever [Oxygen] : the patient uses supplemental oxygen [Oxygen Rate  ___ lpm] : Oxygen rate is [unfilled] lpm [NC] : Nasal Cannula [24 hrs] : 24 hours/day [Former] : is a former smoker [Wt Gain ___ Lbs] : no recent weight gain [Wt Loss ___ Lbs] : no recent weight loss

## 2019-03-22 ENCOUNTER — NON-APPOINTMENT (OUTPATIENT)
Age: 84
End: 2019-03-22

## 2019-03-22 ENCOUNTER — APPOINTMENT (OUTPATIENT)
Dept: CARDIOLOGY | Facility: CLINIC | Age: 84
End: 2019-03-22
Payer: MEDICARE

## 2019-03-22 VITALS
BODY MASS INDEX: 27.66 KG/M2 | HEIGHT: 64 IN | OXYGEN SATURATION: 98 % | HEART RATE: 69 BPM | DIASTOLIC BLOOD PRESSURE: 79 MMHG | WEIGHT: 162 LBS | SYSTOLIC BLOOD PRESSURE: 131 MMHG

## 2019-03-22 PROCEDURE — 93000 ELECTROCARDIOGRAM COMPLETE: CPT

## 2019-03-22 PROCEDURE — 99214 OFFICE O/P EST MOD 30 MIN: CPT

## 2019-03-22 NOTE — HISTORY OF PRESENT ILLNESS
[FreeTextEntry1] : Matt Jeffery presented to the office today for a followup evaluation. He was last seen in the office 4 months ago.\par \par He is now 89 years old, with a history of medically managed severe coronary artery disease. He also has a history of scoliosis, spinal stenosis, lumbosacral radiculopathy and what is likely renal cell CA, followed conservatively. He has an unsteady gait and walks with a cane.  He has a history of hypoxia, likely from hypoventilation, and he is on oxygen. He has a tendency toward relative hypotension. which has been managed with liberalized salt intake.\par \par He continues to slow down. He does not really report any significant chest discomfort. He has about a minute at a time of heartburn type chest discomfort, which does not really bother him. It seems to be food related. His breathing is unchanged. He gets short of breath if he walks more than 50 feet, sometimes a little more or less. His tendency toward edema is unchanged. He fell and sprained his ankle recently.  He does not always use his cane.

## 2019-03-22 NOTE — DISCUSSION/SUMMARY
[FreeTextEntry1] : My goal remains to try to keep him out of the hospital.  We have done well with that overall.  Everything seems stable overall.  He will see me again in September. He can certainly call or be seen much sooner on an as-needed basis.

## 2019-03-22 NOTE — PHYSICAL EXAM
[General Appearance - Well Developed] : well developed [Normal Appearance] : normal appearance [Well Groomed] : well groomed [General Appearance - Well Nourished] : well nourished [No Deformities] : no deformities [General Appearance - In No Acute Distress] : no acute distress [Normal Conjunctiva] : the conjunctiva exhibited no abnormalities [Eyelids - No Xanthelasma] : the eyelids demonstrated no xanthelasmas [Normal Oral Mucosa] : normal oral mucosa [No Oral Pallor] : no oral pallor [No Oral Cyanosis] : no oral cyanosis [Normal Jugular Venous A Waves Present] : normal jugular venous A waves present [Normal Jugular Venous V Waves Present] : normal jugular venous V waves present [No Jugular Venous [place] A Waves] : no jugular venous [place] A waves [Respiration, Rhythm And Depth] : normal respiratory rhythm and effort [Exaggerated Use Of Accessory Muscles For Inspiration] : no accessory muscle use [Auscultation Breath Sounds / Voice Sounds] : lungs were clear to auscultation bilaterally [Abdomen Soft] : soft [Abdomen Tenderness] : non-tender [Abdomen Mass (___ Cm)] : no abdominal mass palpated [FreeTextEntry1] : wheelchair [Nail Clubbing] : no clubbing of the fingernails [Cyanosis, Localized] : no localized cyanosis [Petechial Hemorrhages (___cm)] : no petechial hemorrhages [Skin Color & Pigmentation] : normal skin color and pigmentation [] : no rash [No Venous Stasis] : no venous stasis [Skin Lesions] : no skin lesions [No Skin Ulcers] : no skin ulcer [No Xanthoma] : no  xanthoma was observed [Oriented To Time, Place, And Person] : oriented to person, place, and time [Affect] : the affect was normal [Mood] : the mood was normal [No Anxiety] : not feeling anxious [Normal] : normal [Normal Rate] : normal [Rhythm Regular] : regular [Normal S1] : normal S1 [Normal S2] : normal S2 [No Gallop] : no gallop heard [S3] : no S3 [S4] : no S4 [No Murmur] : no murmurs heard [2+] : left 2+ [Right Carotid Bruit] : no bruit heard over the right carotid [Left Carotid Bruit] : no bruit heard over the left carotid [Right Femoral Bruit] : no bruit heard over the right femoral artery [Left Femoral Bruit] : no bruit heard over the left femoral artery [1+] : right 1+ [Bruit] : no bruit heard [___ +] : bilateral [unfilled]U+ pretibial pitting edema [Rt] : varicose veins of the right leg noted [Lt] : varicose veins of the left leg noted

## 2019-03-22 NOTE — CARDIOLOGY SUMMARY
[___] : [unfilled] [LVEF ___%] : LVEF [unfilled]% [None] : normal LV function [Normal] : normal LA size [Mild] : mild mitral regurgitation

## 2019-03-22 NOTE — REASON FOR VISIT
[Follow-Up - Clinic] : a clinic follow-up of [Coronary Artery Disease] : coronary artery disease [Dyspnea] : dyspnea [Hyperlipidemia] : hyperlipidemia

## 2019-04-08 ENCOUNTER — APPOINTMENT (OUTPATIENT)
Dept: PULMONOLOGY | Facility: CLINIC | Age: 84
End: 2019-04-08
Payer: MEDICARE

## 2019-04-08 PROCEDURE — 94010 BREATHING CAPACITY TEST: CPT

## 2019-04-08 PROCEDURE — 99214 OFFICE O/P EST MOD 30 MIN: CPT | Mod: 25

## 2019-04-08 NOTE — HISTORY OF PRESENT ILLNESS
[None] : ~He/She~ has no significant interval events [Difficulty Breathing During Exertion] : stable dyspnea on exertion [Feelings Of Weakness On Exertion] : stable exercise intolerance [Cough] : denies coughing [Wheezing] : denies wheezing [Regional Soft Tissue Swelling Both Lower Extremities] : stable lower extremity edema [Chest Pain Or Discomfort] : denies chest pain [Fever] : denies fever [Oxygen] : the patient uses supplemental oxygen [Oxygen Rate  ___ lpm] : Oxygen rate is [unfilled] lpm [NC] : Nasal Cannula [24 hrs] : 24 hours/day [Former] : is a former smoker [Wt Gain ___ Lbs] : no recent weight gain [Wt Loss ___ Lbs] : no recent weight loss [FreeTextEntry1] : Voice change -issue budesonide\par \par Noted cardiac f/u note\par  \par no worsening SOB MCCARTY\par  \par no wheeze

## 2019-04-08 NOTE — PROCEDURE
[FreeTextEntry1] : Spirometry  4/8/2019\par \par No BD Given\par \par \par Chest x-ray AP View 2/11/2019\par Cardiomegaly\par No Infiltrates\par Small lung volume\par  sutures noted\par IM FLU Vaccine left upper extremity Oct 22 2018\par \par Noted PMD labs mild renal Insuff with creat 1.53\par

## 2019-04-08 NOTE — REASON FOR VISIT
[Follow-Up] : a follow-up visit [COPD] : COPD [ILD] : ILD [FreeTextEntry2] : Hypoxemia chronic respiratory failure

## 2019-04-08 NOTE — DISCUSSION/SUMMARY
[FreeTextEntry1] : COPD\par  ILD\par Hypoxemic respiratory failure\par Pulmonary HTN\par Lower extremity edema\par Borderline Low BP\par ASHD\par Hold budesonide b/c of voice which has improved\par continue Albuterol\par Patient on long-term oxygen therapy with clinical benefit and improved well-being.  Continue long-term oxygen care as ordered\par Try to keep  goal to remain out of hospital setting\par 1 mos f/u

## 2019-04-08 NOTE — REVIEW OF SYSTEMS
[As Noted in HPI] : as noted in HPI [Edema] : ~T edema was present [Heartburn] : heartburn [Negative] : Endocrine [Hypotension] : no hypotension [Chest Discomfort] : no chest discomfort [PND] : no PND [Palpitations] : no palpitations [Dysphagia] : no dysphagia [Nausea] : no nausea [Vomiting] : no vomiting [Constipation] : no constipation [Diarrhea] : no diarrhea [Abdominal Pain] : no abdominal pain [FreeTextEntry9] : CHF ASHD Diastolic dysfx HLD [de-identified] : Mild Pulmonary HTN

## 2019-04-08 NOTE — PHYSICAL EXAM
[General Appearance - Well Developed] : well developed [Well Groomed] : well groomed [General Appearance - Well Nourished] : well nourished [General Appearance - In No Acute Distress] : no acute distress [Normal Conjunctiva] : the conjunctiva exhibited no abnormalities [Normal Oropharynx] : normal oropharynx [Neck Appearance] : the appearance of the neck was normal [Neck Cervical Mass (___cm)] : no neck mass was observed [Jugular Venous Distention Increased] : there was no jugular-venous distention [Heart Rate And Rhythm] : heart rate and rhythm were normal [Heart Sounds] : normal S1 and S2 [Murmurs] : no murmurs present [Respiration, Rhythm And Depth] : normal respiratory rhythm and effort [Exaggerated Use Of Accessory Muscles For Inspiration] : no accessory muscle use [Chest Palpation] : palpation of the chest revealed no abnormalities [Lungs Percussion] : the lungs were normal to percussion [Bowel Sounds] : normal bowel sounds [Abdomen Soft] : soft [Abdomen Tenderness] : non-tender [Abdomen Mass (___ Cm)] : no abdominal mass palpated [Nail Clubbing] : no clubbing of the fingernails [Cyanosis, Localized] : no localized cyanosis [1+ Pitting] : 1+  pitting [Skin Color & Pigmentation] : normal skin color and pigmentation [] : no rash [No Venous Stasis] : no venous stasis [Skin Lesions] : no skin lesions [No Skin Ulcers] : no skin ulcer [No Xanthoma] : no  xanthoma was observed [Oriented To Time, Place, And Person] : oriented to person, place, and time [Impaired Insight] : insight and judgment were intact [Affect] : the affect was normal [FreeTextEntry1] : Coarse BS with mild prolonged expiratory phase with mild crackles

## 2019-04-10 ENCOUNTER — APPOINTMENT (OUTPATIENT)
Dept: INTERNAL MEDICINE | Facility: CLINIC | Age: 84
End: 2019-04-10
Payer: MEDICARE

## 2019-04-10 VITALS
TEMPERATURE: 97.5 F | RESPIRATION RATE: 14 BRPM | OXYGEN SATURATION: 96 % | HEIGHT: 64 IN | HEART RATE: 68 BPM | DIASTOLIC BLOOD PRESSURE: 68 MMHG | SYSTOLIC BLOOD PRESSURE: 112 MMHG

## 2019-04-10 DIAGNOSIS — I25.10 ATHEROSCLEROTIC HEART DISEASE OF NATIVE CORONARY ARTERY W/OUT ANGINA PECTORIS: ICD-10-CM

## 2019-04-10 DIAGNOSIS — I50.31 ACUTE DIASTOLIC (CONGESTIVE) HEART FAILURE: ICD-10-CM

## 2019-04-10 LAB
BASOPHILS # BLD AUTO: 0.02 K/UL
BASOPHILS NFR BLD AUTO: 0.3 %
EOSINOPHIL # BLD AUTO: 0.11 K/UL
EOSINOPHIL NFR BLD AUTO: 1.7 %
HCT VFR BLD CALC: 33.2 %
HGB BLD-MCNC: 10.4 G/DL
IMM GRANULOCYTES NFR BLD AUTO: 0.6 %
LYMPHOCYTES # BLD AUTO: 1.52 K/UL
LYMPHOCYTES NFR BLD AUTO: 23 %
MAN DIFF?: NORMAL
MCHC RBC-ENTMCNC: 30.9 PG
MCHC RBC-ENTMCNC: 31.3 GM/DL
MCV RBC AUTO: 98.5 FL
MONOCYTES # BLD AUTO: 0.8 K/UL
MONOCYTES NFR BLD AUTO: 12.1 %
NEUTROPHILS # BLD AUTO: 4.13 K/UL
NEUTROPHILS NFR BLD AUTO: 62.3 %
PLATELET # BLD AUTO: 226 K/UL
RBC # BLD: 3.37 M/UL
RBC # FLD: 14.6 %
WBC # FLD AUTO: 6.62 K/UL

## 2019-04-10 PROCEDURE — 36415 COLL VENOUS BLD VENIPUNCTURE: CPT

## 2019-04-10 PROCEDURE — 99214 OFFICE O/P EST MOD 30 MIN: CPT | Mod: 25

## 2019-04-10 NOTE — HISTORY OF PRESENT ILLNESS
[FreeTextEntry1] : Here for follow up for his breathing Oxygen dependent and cholesterol\par has leg edema\par sees both Pulmonologist and Cardiologist

## 2019-04-10 NOTE — PHYSICAL EXAM
[No Acute Distress] : no acute distress [Well Nourished] : well nourished [Well Developed] : well developed [Well-Appearing] : well-appearing [Normal Sclera/Conjunctiva] : normal sclera/conjunctiva [EOMI] : extraocular movements intact [PERRL] : pupils equal round and reactive to light [Normal Outer Ear/Nose] : the outer ears and nose were normal in appearance [Normal Oropharynx] : the oropharynx was normal [No JVD] : no jugular venous distention [Supple] : supple [No Lymphadenopathy] : no lymphadenopathy [Thyroid Normal, No Nodules] : the thyroid was normal and there were no nodules present [No Respiratory Distress] : no respiratory distress  [No Accessory Muscle Use] : no accessory muscle use [Decreased Breath Sounds] : breath sounds were decreased diffusely [Normal Rate] : normal rate  [Regular Rhythm] : with a regular rhythm [Normal S1, S2] : normal S1 and S2 [No Murmur] : no murmur heard [No Carotid Bruits] : no carotid bruits [No Abdominal Bruit] : a ~M bruit was not heard ~T in the abdomen [No Varicosities] : no varicosities [Pedal Pulses Present] : the pedal pulses are present [No Extremity Clubbing/Cyanosis] : no extremity clubbing/cyanosis [No Palpable Aorta] : no palpable aorta [Soft] : abdomen soft [Non Tender] : non-tender [Non-distended] : non-distended [No Masses] : no abdominal mass palpated [No HSM] : no HSM [Normal Bowel Sounds] : normal bowel sounds [Normal Supraclavicular Nodes] : no supraclavicular lymphadenopathy [Normal Posterior Cervical Nodes] : no posterior cervical lymphadenopathy [Normal Anterior Cervical Nodes] : no anterior cervical lymphadenopathy [No CVA Tenderness] : no CVA  tenderness [No Spinal Tenderness] : no spinal tenderness [No Joint Swelling] : no joint swelling [Grossly Normal Strength/Tone] : grossly normal strength/tone [No Rash] : no rash [Coordination Grossly Intact] : coordination grossly intact [No Focal Deficits] : no focal deficits [Deep Tendon Reflexes (DTR)] : deep tendon reflexes were 2+ and symmetric [Speech Grossly Normal] : speech grossly normal [Memory Grossly Normal] : memory grossly normal [Normal Affect] : the affect was normal [Alert and Oriented x3] : oriented to person, place, and time [Normal Mood] : the mood was normal [Normal Insight/Judgement] : insight and judgment were intact [de-identified] : bilateral leg edema [de-identified] : unsteady gait uses wheelchair

## 2019-04-11 LAB
25(OH)D3 SERPL-MCNC: 35.4 NG/ML
ALBUMIN SERPL ELPH-MCNC: 4.2 G/DL
ALP BLD-CCNC: 107 U/L
ALT SERPL-CCNC: 12 U/L
ANION GAP SERPL CALC-SCNC: 11 MMOL/L
AST SERPL-CCNC: 12 U/L
BILIRUB SERPL-MCNC: 0.4 MG/DL
BUN SERPL-MCNC: 41 MG/DL
CALCIUM SERPL-MCNC: 9.3 MG/DL
CHLORIDE SERPL-SCNC: 104 MMOL/L
CHOLEST SERPL-MCNC: 96 MG/DL
CHOLEST/HDLC SERPL: 2.6 RATIO
CK SERPL-CCNC: 22 U/L
CO2 SERPL-SCNC: 27 MMOL/L
CREAT SERPL-MCNC: 1.58 MG/DL
ESTIMATED AVERAGE GLUCOSE: 114 MG/DL
GLUCOSE SERPL-MCNC: 128 MG/DL
HBA1C MFR BLD HPLC: 5.6 %
HDLC SERPL-MCNC: 37 MG/DL
LDLC SERPL CALC-MCNC: 38 MG/DL
POTASSIUM SERPL-SCNC: 4.7 MMOL/L
PROT SERPL-MCNC: 6.4 G/DL
SODIUM SERPL-SCNC: 142 MMOL/L
TRIGL SERPL-MCNC: 103 MG/DL
TSH SERPL-ACNC: 2.65 UIU/ML

## 2019-05-09 ENCOUNTER — APPOINTMENT (OUTPATIENT)
Dept: PULMONOLOGY | Facility: CLINIC | Age: 84
End: 2019-05-09
Payer: MEDICARE

## 2019-05-09 VITALS — DIASTOLIC BLOOD PRESSURE: 63 MMHG | SYSTOLIC BLOOD PRESSURE: 99 MMHG | OXYGEN SATURATION: 95 % | HEART RATE: 70 BPM

## 2019-05-09 PROCEDURE — 99214 OFFICE O/P EST MOD 30 MIN: CPT | Mod: 25

## 2019-05-09 PROCEDURE — 94010 BREATHING CAPACITY TEST: CPT

## 2019-05-09 NOTE — REVIEW OF SYSTEMS
[As Noted in HPI] : as noted in HPI [Edema] : ~T edema was present [Heartburn] : heartburn [Negative] : Endocrine [Hypotension] : no hypotension [Chest Discomfort] : no chest discomfort [PND] : no PND [Palpitations] : no palpitations [Nausea] : no nausea [Vomiting] : no vomiting [Dysphagia] : no dysphagia [Constipation] : no constipation [Diarrhea] : no diarrhea [Abdominal Pain] : no abdominal pain [FreeTextEntry9] : CHF ASHD Diastolic dysfx HLD [de-identified] : Mild Pulmonary HTN

## 2019-05-09 NOTE — PHYSICAL EXAM
[General Appearance - Well Developed] : well developed [Well Groomed] : well groomed [General Appearance - Well Nourished] : well nourished [General Appearance - In No Acute Distress] : no acute distress [Normal Conjunctiva] : the conjunctiva exhibited no abnormalities [Normal Oropharynx] : normal oropharynx [Neck Appearance] : the appearance of the neck was normal [Neck Cervical Mass (___cm)] : no neck mass was observed [Jugular Venous Distention Increased] : there was no jugular-venous distention [Heart Rate And Rhythm] : heart rate and rhythm were normal [Heart Sounds] : normal S1 and S2 [Murmurs] : no murmurs present [Respiration, Rhythm And Depth] : normal respiratory rhythm and effort [Exaggerated Use Of Accessory Muscles For Inspiration] : no accessory muscle use [Chest Palpation] : palpation of the chest revealed no abnormalities [Lungs Percussion] : the lungs were normal to percussion [Bowel Sounds] : normal bowel sounds [Abdomen Soft] : soft [Abdomen Tenderness] : non-tender [Abdomen Mass (___ Cm)] : no abdominal mass palpated [Cyanosis, Localized] : no localized cyanosis [Nail Clubbing] : no clubbing of the fingernails [1+ Pitting] : 1+  pitting [Skin Color & Pigmentation] : normal skin color and pigmentation [] : no rash [No Venous Stasis] : no venous stasis [No Skin Ulcers] : no skin ulcer [Skin Lesions] : no skin lesions [No Xanthoma] : no  xanthoma was observed [Oriented To Time, Place, And Person] : oriented to person, place, and time [Impaired Insight] : insight and judgment were intact [Affect] : the affect was normal [FreeTextEntry1] : Coarse BS with mild prolonged expiratory phase with mild crackles

## 2019-05-09 NOTE — PROCEDURE
[FreeTextEntry1] : Spirometry 5/9/19\par No BD Given\par Moderate reduction flow rates\par \par End Tidal CO2 36\par \par \par Chest x-ray AP View 2/11/2019\par Cardiomegaly\par No Infiltrates\par Small lung volume\par  sutures noted\par IM FLU Vaccine left upper extremity Oct 22 2018\par \par Noted PMD labs mild renal Insuff with creat 1.53\par

## 2019-05-09 NOTE — DISCUSSION/SUMMARY
[FreeTextEntry1] : COPD\par  ILD\par Hypoxemic respiratory failure\par Pulmonary HTN mild\par Lower extremity edema\par Borderline Low BP\par ASHD\par Hold budesonide b/c of voice which has improved\par continue Albuterol\par Patient on long-term oxygen therapy with clinical benefit and improved well-being.  Continue long-term oxygen care as ordered\par \par 1 mos f/u

## 2019-05-09 NOTE — REASON FOR VISIT
[Follow-Up] : a follow-up visit [COPD] : COPD [ILD] : ILD [Pulmonary Hypertension] : pulmonary hypertension [FreeTextEntry2] : Tonic hypoxemic respiratory failure

## 2019-06-21 ENCOUNTER — APPOINTMENT (OUTPATIENT)
Dept: PULMONOLOGY | Facility: CLINIC | Age: 84
End: 2019-06-21
Payer: MEDICARE

## 2019-06-21 VITALS
SYSTOLIC BLOOD PRESSURE: 94 MMHG | WEIGHT: 162 LBS | BODY MASS INDEX: 27.66 KG/M2 | OXYGEN SATURATION: 94 % | HEART RATE: 72 BPM | DIASTOLIC BLOOD PRESSURE: 56 MMHG | HEIGHT: 64 IN | TEMPERATURE: 97.2 F | RESPIRATION RATE: 14 BRPM

## 2019-06-21 PROCEDURE — 71046 X-RAY EXAM CHEST 2 VIEWS: CPT

## 2019-06-21 PROCEDURE — 94010 BREATHING CAPACITY TEST: CPT

## 2019-06-21 PROCEDURE — 99213 OFFICE O/P EST LOW 20 MIN: CPT | Mod: 25

## 2019-06-21 NOTE — PROCEDURE
[FreeTextEntry1] : Spirometry 6/21/2019\par No BD Given\par Moderate reduction flow rates\par \par Chest x-ray AP lateral June 21, 2019\par Borderline cardiomegaly\par Rotated x-ray due to patient's anatomy\par Mild elevation right hemidiaphragm\par No development of pleural effusions\par Calcification aortic knob\par No dominant pulmonary nodules\par Right basilar pleural fibrosis thickening\par No interval change compared to chest x-ray dating back to October 19, 2016\par \par \par Noted PMD labs mild renal Insuff with creat 1.53\par

## 2019-06-21 NOTE — REVIEW OF SYSTEMS
[As Noted in HPI] : as noted in HPI [Heartburn] : heartburn [Edema] : ~T edema was present [Negative] : Endocrine [Hypotension] : no hypotension [PND] : no PND [Chest Discomfort] : no chest discomfort [Palpitations] : no palpitations [Dysphagia] : no dysphagia [Nausea] : no nausea [Vomiting] : no vomiting [Constipation] : no constipation [Diarrhea] : no diarrhea [FreeTextEntry9] : CHF ASHD Diastolic dysfx HLD [Abdominal Pain] : no abdominal pain [de-identified] : Mild Pulmonary HTN

## 2019-06-21 NOTE — REASON FOR VISIT
[Follow-Up] : a follow-up visit [COPD] : COPD [ILD] : ILD [Pulmonary Hypertension] : pulmonary hypertension [FreeTextEntry2] : hypoxemia

## 2019-06-21 NOTE — PHYSICAL EXAM
[General Appearance - Well Developed] : well developed [Well Groomed] : well groomed [General Appearance - In No Acute Distress] : no acute distress [General Appearance - Well Nourished] : well nourished [Normal Conjunctiva] : the conjunctiva exhibited no abnormalities [Normal Oropharynx] : normal oropharynx [Neck Appearance] : the appearance of the neck was normal [Neck Cervical Mass (___cm)] : no neck mass was observed [Heart Rate And Rhythm] : heart rate and rhythm were normal [Jugular Venous Distention Increased] : there was no jugular-venous distention [Heart Sounds] : normal S1 and S2 [Murmurs] : no murmurs present [Respiration, Rhythm And Depth] : normal respiratory rhythm and effort [Exaggerated Use Of Accessory Muscles For Inspiration] : no accessory muscle use [Chest Palpation] : palpation of the chest revealed no abnormalities [Lungs Percussion] : the lungs were normal to percussion [Abdomen Soft] : soft [Bowel Sounds] : normal bowel sounds [Abdomen Tenderness] : non-tender [Cyanosis, Localized] : no localized cyanosis [Abdomen Mass (___ Cm)] : no abdominal mass palpated [Nail Clubbing] : no clubbing of the fingernails [1+ Pitting] : 1+  pitting [Skin Color & Pigmentation] : normal skin color and pigmentation [Skin Lesions] : no skin lesions [No Venous Stasis] : no venous stasis [] : no rash [Oriented To Time, Place, And Person] : oriented to person, place, and time [No Xanthoma] : no  xanthoma was observed [No Skin Ulcers] : no skin ulcer [Affect] : the affect was normal [Impaired Insight] : insight and judgment were intact [FreeTextEntry1] : Coarse BS with mild prolonged expiratory phase with mild crackles

## 2019-06-21 NOTE — HISTORY OF PRESENT ILLNESS
[None] : ~He/She~ has no significant interval events [Difficulty Breathing During Exertion] : stable dyspnea on exertion [Feelings Of Weakness On Exertion] : stable exercise intolerance [Cough] : denies coughing [Wheezing] : denies wheezing [Regional Soft Tissue Swelling Both Lower Extremities] : stable lower extremity edema [Chest Pain Or Discomfort] : denies chest pain [Fever] : denies fever [Oxygen] : the patient uses supplemental oxygen [Oxygen Rate  ___ lpm] : Oxygen rate is [unfilled] lpm [24 hrs] : 24 hours/day [NC] : Nasal Cannula [Former] : is a former smoker [Wt Gain ___ Lbs] : no recent weight gain [Wt Loss ___ Lbs] : no recent weight loss [FreeTextEntry1] : Voice change -issue budesonide\par \par Noted cardiac f/u note\par  \par no worsening SOB MCCARTY\par  \par no wheeze

## 2019-07-26 ENCOUNTER — APPOINTMENT (OUTPATIENT)
Dept: PULMONOLOGY | Facility: CLINIC | Age: 84
End: 2019-07-26
Payer: MEDICARE

## 2019-07-26 VITALS
OXYGEN SATURATION: 100 % | HEART RATE: 60 BPM | DIASTOLIC BLOOD PRESSURE: 73 MMHG | TEMPERATURE: 97.8 F | SYSTOLIC BLOOD PRESSURE: 126 MMHG

## 2019-07-26 PROCEDURE — 94727 GAS DIL/WSHOT DETER LNG VOL: CPT

## 2019-07-26 PROCEDURE — 94010 BREATHING CAPACITY TEST: CPT

## 2019-07-26 PROCEDURE — 99214 OFFICE O/P EST MOD 30 MIN: CPT | Mod: 25

## 2019-07-26 PROCEDURE — 94729 DIFFUSING CAPACITY: CPT

## 2019-07-26 RX ORDER — BENZONATATE 100 MG/1
100 CAPSULE ORAL
Qty: 30 | Refills: 3 | Status: DISCONTINUED | COMMUNITY
Start: 2019-02-04 | End: 2019-07-26

## 2019-07-26 NOTE — HISTORY OF PRESENT ILLNESS
[None] : ~He/She~ has no significant interval events [Difficulty Breathing During Exertion] : stable dyspnea on exertion [Wheezing] : denies wheezing [Cough] : denies coughing [Feelings Of Weakness On Exertion] : stable exercise intolerance [Regional Soft Tissue Swelling Both Lower Extremities] : stable lower extremity edema [Fever] : denies fever [Chest Pain Or Discomfort] : denies chest pain [Oxygen Rate  ___ lpm] : Oxygen rate is [unfilled] lpm [Oxygen] : the patient uses supplemental oxygen [NC] : Nasal Cannula [24 hrs] : 24 hours/day [Former] : is a former smoker [Wt Gain ___ Lbs] : no recent weight gain [Wt Loss ___ Lbs] : no recent weight loss [FreeTextEntry1] : Voice change -issue budesonide\par \par Noted cardiac f/u note\par  \par no worsening SOB MCCARTY\par  \par no wheeze

## 2019-07-26 NOTE — PROCEDURE
[FreeTextEntry1] : PFT without bronchodilator July 26, 2019\par Moderate obstructive ventilatory impairment\par Air-trapping RV/TLC ratio 145% predicted\par Moderate to severe reduction diffusion 52% predicted with a loss of functioning alveolocapillary units\par Hemoglobin 10.4\par Overall pulmonary physiology is stable dating back to 2016\par \par Chest x-ray AP lateral June 21, 2019\par Borderline cardiomegaly\par Rotated x-ray due to patient's anatomy\par Mild elevation right hemidiaphragm\par No development of pleural effusions\par Calcification aortic knob\par No dominant pulmonary nodules\par Right basilar pleural fibrosis thickening\par No interval change compared to chest x-ray dating back to October 19, 2016\par \par \par Noted PMD labs mild renal Insuff with creat 1.53\par

## 2019-07-26 NOTE — REVIEW OF SYSTEMS
[As Noted in HPI] : as noted in HPI [Heartburn] : heartburn [Edema] : ~T edema was present [Negative] : Genitourinary [Hypotension] : no hypotension [Chest Discomfort] : no chest discomfort [PND] : no PND [Palpitations] : no palpitations [Dysphagia] : no dysphagia [Nausea] : no nausea [Vomiting] : no vomiting [Constipation] : no constipation [Diarrhea] : no diarrhea [Abdominal Pain] : no abdominal pain [de-identified] : Mild Pulmonary HTN [FreeTextEntry9] : CHF ASHD Diastolic dysfx HLD

## 2019-07-26 NOTE — PHYSICAL EXAM
[General Appearance - Well Developed] : well developed [General Appearance - Well Nourished] : well nourished [Well Groomed] : well groomed [General Appearance - In No Acute Distress] : no acute distress [Normal Conjunctiva] : the conjunctiva exhibited no abnormalities [Neck Appearance] : the appearance of the neck was normal [Normal Oropharynx] : normal oropharynx [Neck Cervical Mass (___cm)] : no neck mass was observed [Jugular Venous Distention Increased] : there was no jugular-venous distention [Heart Sounds] : normal S1 and S2 [Heart Rate And Rhythm] : heart rate and rhythm were normal [Murmurs] : no murmurs present [Respiration, Rhythm And Depth] : normal respiratory rhythm and effort [Chest Palpation] : palpation of the chest revealed no abnormalities [Exaggerated Use Of Accessory Muscles For Inspiration] : no accessory muscle use [Lungs Percussion] : the lungs were normal to percussion [Bowel Sounds] : normal bowel sounds [Abdomen Soft] : soft [Abdomen Tenderness] : non-tender [Nail Clubbing] : no clubbing of the fingernails [Abdomen Mass (___ Cm)] : no abdominal mass palpated [1+ Pitting] : 1+  pitting [Cyanosis, Localized] : no localized cyanosis [] : no rash [No Venous Stasis] : no venous stasis [Skin Color & Pigmentation] : normal skin color and pigmentation [No Skin Ulcers] : no skin ulcer [Skin Lesions] : no skin lesions [No Xanthoma] : no  xanthoma was observed [Impaired Insight] : insight and judgment were intact [Oriented To Time, Place, And Person] : oriented to person, place, and time [Affect] : the affect was normal [FreeTextEntry1] : Coarse BS with mild prolonged expiratory phase with mild crackles

## 2019-07-26 NOTE — REASON FOR VISIT
[Follow-Up] : a follow-up visit [COPD] : COPD [FreeTextEntry2] : Chronic Respiratory Hypoxemic failure

## 2019-08-14 ENCOUNTER — APPOINTMENT (OUTPATIENT)
Dept: INTERNAL MEDICINE | Facility: CLINIC | Age: 84
End: 2019-08-14
Payer: MEDICARE

## 2019-08-14 VITALS
HEART RATE: 61 BPM | SYSTOLIC BLOOD PRESSURE: 102 MMHG | HEIGHT: 64 IN | OXYGEN SATURATION: 95 % | TEMPERATURE: 98 F | RESPIRATION RATE: 14 BRPM | DIASTOLIC BLOOD PRESSURE: 60 MMHG

## 2019-08-14 PROCEDURE — 99214 OFFICE O/P EST MOD 30 MIN: CPT

## 2019-08-14 NOTE — HISTORY OF PRESENT ILLNESS
[FreeTextEntry1] : BP check [de-identified] : Pt c/o nail fungus which got worse with use of topical medication I gave him. \par c/o pain in his right hip. Hurts when he stands and applies weight and like leg will give out.

## 2019-08-14 NOTE — REVIEW OF SYSTEMS
[Joint Pain] : joint pain [Nail Changes] : nail changes [Negative] : Heme/Lymph [FreeTextEntry6] : on oxygen [de-identified] : nail fungus

## 2019-08-30 NOTE — PHYSICAL EXAM
[General Appearance - Well Developed] : well developed [Well Groomed] : well groomed [General Appearance - Well Nourished] : well nourished [General Appearance - In No Acute Distress] : no acute distress [Normal Conjunctiva] : the conjunctiva exhibited no abnormalities [Normal Oropharynx] : normal oropharynx [Neck Appearance] : the appearance of the neck was normal [Neck Cervical Mass (___cm)] : no neck mass was observed [Jugular Venous Distention Increased] : there was no jugular-venous distention [Heart Rate And Rhythm] : heart rate and rhythm were normal [Heart Sounds] : normal S1 and S2 [Murmurs] : no murmurs present [Respiration, Rhythm And Depth] : normal respiratory rhythm and effort [Chest Palpation] : palpation of the chest revealed no abnormalities [Exaggerated Use Of Accessory Muscles For Inspiration] : no accessory muscle use [Lungs Percussion] : the lungs were normal to percussion [Abdomen Soft] : soft [Bowel Sounds] : normal bowel sounds [Abdomen Tenderness] : non-tender [Abdomen Mass (___ Cm)] : no abdominal mass palpated [Nail Clubbing] : no clubbing of the fingernails [Cyanosis, Localized] : no localized cyanosis [1+ Pitting] : 1+  pitting [Skin Color & Pigmentation] : normal skin color and pigmentation [] : no rash [No Venous Stasis] : no venous stasis [Skin Lesions] : no skin lesions [No Skin Ulcers] : no skin ulcer [No Xanthoma] : no  xanthoma was observed [Oriented To Time, Place, And Person] : oriented to person, place, and time [Impaired Insight] : insight and judgment were intact [Affect] : the affect was normal [FreeTextEntry1] : No JVD No S3 S4 /gallop [FreeTextEntry2] : No tenderness

## 2019-08-30 NOTE — REASON FOR VISIT
[Follow-Up] : a follow-up visit [COPD] : COPD [ILD] : ILD [FreeTextEntry2] : Chronic hypoxemic respiratory failure

## 2019-08-30 NOTE — HISTORY OF PRESENT ILLNESS
[None] : ~He/She~ has no significant interval events [Difficulty Breathing During Exertion] : stable dyspnea on exertion [Feelings Of Weakness On Exertion] : stable exercise intolerance [Cough] : denies coughing [Wheezing] : denies wheezing [Regional Soft Tissue Swelling Both Lower Extremities] : stable lower extremity edema [Chest Pain Or Discomfort] : denies chest pain [Fever] : denies fever [Oxygen] : the patient uses supplemental oxygen [Oxygen Rate  ___ lpm] : Oxygen rate is [unfilled] lpm [NC] : Nasal Cannula [24 hrs] : 24 hours/day [Former] : is a former smoker [Wt Gain ___ Lbs] : no recent weight gain [Wt Loss ___ Lbs] : no recent weight loss [FreeTextEntry1] : Voice change -issue budesonide\par not always able to clear secretions\par Noted cardiac f/u note\par no worsening SOB MCCARTY\par  no wheeze\par no fever chills sweats\par

## 2019-08-30 NOTE — PROCEDURE
[FreeTextEntry1] : Spirometry without BD\par  moderate reduction flow rates\par MIP 33%\par MEP 68%\par End-tidal CO2 33\par \par PFT without bronchodilator July 26, 2019\par Moderate obstructive ventilatory impairment\par Air-trapping RV/TLC ratio 145% predicted\par Moderate to severe reduction diffusion 52% predicted with a loss of functioning alveolocapillary units\par Hemoglobin 10.4\par Overall pulmonary physiology is stable dating back to 2016\par \par Chest x-ray AP lateral June 21, 2019\par Borderline cardiomegaly\par Rotated x-ray due to patient's anatomy\par Mild elevation right hemidiaphragm\par No development of pleural effusions\par Calcification aortic knob\par No dominant pulmonary nodules\par Right basilar pleural fibrosis thickening\par No interval change compared to chest x-ray dating back to October 19, 2016\par \par \par Noted PMD labs mild renal Insuff with creat 1.53\par

## 2019-08-30 NOTE — DISCUSSION/SUMMARY
[FreeTextEntry1] : COPD\par  ILD\par Hypoxemic respiratory failure\par Some component of NM< weakness with MIP 33 % pred\par Pulmonary HTN\par Lower extremity edema\par Borderline Low BP\par ASHD\par Hold budesonide b/c of voice which has improved\par continue Albuterol\par Patient on long-term oxygen therapy with clinical benefit and improved well-being.  Continue long-term oxygen care as ordered\par 1 mos f/u

## 2019-08-30 NOTE — REVIEW OF SYSTEMS
[As Noted in HPI] : as noted in HPI [Edema] : ~T edema was present [Heartburn] : heartburn [Negative] : Endocrine [Chest Discomfort] : no chest discomfort [Hypotension] : no hypotension [Palpitations] : no palpitations [PND] : no PND [Nausea] : no nausea [Dysphagia] : no dysphagia [Vomiting] : no vomiting [Constipation] : no constipation [Diarrhea] : no diarrhea [Abdominal Pain] : no abdominal pain [FreeTextEntry9] : CHF ASHD Diastolic dysfx HLD [de-identified] : Mild Pulmonary HTN

## 2019-10-03 NOTE — REVIEW OF SYSTEMS
[As Noted in HPI] : as noted in HPI [Heartburn] : heartburn [Edema] : ~T edema was present [Negative] : Endocrine [Hypotension] : no hypotension [Chest Discomfort] : no chest discomfort [Palpitations] : no palpitations [PND] : no PND [Dysphagia] : no dysphagia [Nausea] : no nausea [Vomiting] : no vomiting [Constipation] : no constipation [Diarrhea] : no diarrhea [Abdominal Pain] : no abdominal pain [de-identified] : Mild Pulmonary HTN [FreeTextEntry9] : CHF ASHD Diastolic dysfx HLD

## 2019-10-03 NOTE — DISCUSSION/SUMMARY
[FreeTextEntry1] : COPD\par Status post treatment with Z-Sy Medrol with interval improvement of symptoms although noted still mild decline of flow rate\par  ILD\par Hypoxemic respiratory failure\par Pulmonary HTN\par Lower extremity edema\par Borderline Low BP\par ASHD\par Hold budesonide b/c of voice which has improved\par continue Albuterol\par Patient on long-term oxygen therapy with clinical benefit and improved well-being.  Continue long-term oxygen care as ordered\par \par 1 mos f/u

## 2019-10-03 NOTE — HISTORY OF PRESENT ILLNESS
[Difficulty Breathing During Exertion] : stable dyspnea on exertion [None] : ~He/She~ has no significant interval events [Feelings Of Weakness On Exertion] : stable exercise intolerance [Cough] : denies coughing [Chest Pain Or Discomfort] : denies chest pain [Wheezing] : denies wheezing [Regional Soft Tissue Swelling Both Lower Extremities] : stable lower extremity edema [Fever] : denies fever [Oxygen Rate  ___ lpm] : Oxygen rate is [unfilled] lpm [Oxygen] : the patient uses supplemental oxygen [NC] : Nasal Cannula [24 hrs] : 24 hours/day [Former] : is a former smoker [Wt Gain ___ Lbs] : no recent weight gain [Wt Loss ___ Lbs] : no recent weight loss [FreeTextEntry1] : 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

## 2019-10-03 NOTE — PROCEDURE
[FreeTextEntry1] : Spirometry without bronchodilator October 3, 2019\par  severe reduction in flow rates\par \par End-tidal CO2 34\par \par PFT without bronchodilator July 26, 2019\par Moderate obstructive ventilatory impairment\par Air-trapping RV/TLC ratio 145% predicted\par Moderate to severe reduction diffusion 52% predicted with a loss of functioning alveolocapillary units\par Hemoglobin 10.4\par Overall pulmonary physiology is stable dating back to 2016\par \par Chest x-ray AP lateral June 21, 2019\par Borderline cardiomegaly\par Rotated x-ray due to patient's anatomy\par Mild elevation right hemidiaphragm\par No development of pleural effusions\par Calcification aortic knob\par No dominant pulmonary nodules\par Right basilar pleural fibrosis thickening\par No interval change compared to chest x-ray dating back to October 19, 2016\par \par \par Noted PMD labs mild renal Insuff with creat 1.53\par \par High-dose flu vaccination administered\par

## 2019-10-03 NOTE — PHYSICAL EXAM
[General Appearance - Well Developed] : well developed [General Appearance - Well Nourished] : well nourished [Well Groomed] : well groomed [General Appearance - In No Acute Distress] : no acute distress [Normal Conjunctiva] : the conjunctiva exhibited no abnormalities [Normal Oropharynx] : normal oropharynx [Neck Appearance] : the appearance of the neck was normal [Jugular Venous Distention Increased] : there was no jugular-venous distention [Neck Cervical Mass (___cm)] : no neck mass was observed [Heart Rate And Rhythm] : heart rate and rhythm were normal [Heart Sounds] : normal S1 and S2 [Murmurs] : no murmurs present [Respiration, Rhythm And Depth] : normal respiratory rhythm and effort [Chest Palpation] : palpation of the chest revealed no abnormalities [Exaggerated Use Of Accessory Muscles For Inspiration] : no accessory muscle use [Lungs Percussion] : the lungs were normal to percussion [Bowel Sounds] : normal bowel sounds [Abdomen Tenderness] : non-tender [Abdomen Soft] : soft [Abdomen Mass (___ Cm)] : no abdominal mass palpated [Nail Clubbing] : no clubbing of the fingernails [Cyanosis, Localized] : no localized cyanosis [1+ Pitting] : 1+  pitting [Skin Color & Pigmentation] : normal skin color and pigmentation [] : no rash [No Venous Stasis] : no venous stasis [Skin Lesions] : no skin lesions [No Skin Ulcers] : no skin ulcer [Oriented To Time, Place, And Person] : oriented to person, place, and time [No Xanthoma] : no  xanthoma was observed [Affect] : the affect was normal [Impaired Insight] : insight and judgment were intact [FreeTextEntry1] : Coarse BS with mild prolonged expiratory phase with mild crackles  [FreeTextEntry2] : No tenderness

## 2019-10-04 NOTE — DISCUSSION/SUMMARY
[FreeTextEntry1] : My goal remains to try to keep him out of the hospital.  We have done well with that overall.  Everything seems stable overall.  He will see me again in April. He can certainly call or be seen much sooner on an as-needed basis.

## 2019-10-04 NOTE — REASON FOR VISIT
[Coronary Artery Disease] : coronary artery disease [Follow-Up - Clinic] : a clinic follow-up of [Dyspnea] : dyspnea [Hyperlipidemia] : hyperlipidemia

## 2019-10-04 NOTE — PHYSICAL EXAM
[General Appearance - Well Developed] : well developed [Normal Appearance] : normal appearance [Well Groomed] : well groomed [General Appearance - Well Nourished] : well nourished [No Deformities] : no deformities [General Appearance - In No Acute Distress] : no acute distress [Normal Conjunctiva] : the conjunctiva exhibited no abnormalities [Normal Oral Mucosa] : normal oral mucosa [Eyelids - No Xanthelasma] : the eyelids demonstrated no xanthelasmas [No Oral Cyanosis] : no oral cyanosis [No Oral Pallor] : no oral pallor [Normal Jugular Venous A Waves Present] : normal jugular venous A waves present [No Jugular Venous [place] A Waves] : no jugular venous [place] A waves [Normal Jugular Venous V Waves Present] : normal jugular venous V waves present [Exaggerated Use Of Accessory Muscles For Inspiration] : no accessory muscle use [Respiration, Rhythm And Depth] : normal respiratory rhythm and effort [Abdomen Soft] : soft [Auscultation Breath Sounds / Voice Sounds] : lungs were clear to auscultation bilaterally [Abdomen Tenderness] : non-tender [Abdomen Mass (___ Cm)] : no abdominal mass palpated [FreeTextEntry1] : wheelchair [Nail Clubbing] : no clubbing of the fingernails [Cyanosis, Localized] : no localized cyanosis [Petechial Hemorrhages (___cm)] : no petechial hemorrhages [] : no rash [Skin Color & Pigmentation] : normal skin color and pigmentation [No Skin Ulcers] : no skin ulcer [Skin Lesions] : no skin lesions [No Venous Stasis] : no venous stasis [Oriented To Time, Place, And Person] : oriented to person, place, and time [No Xanthoma] : no  xanthoma was observed [Affect] : the affect was normal [Mood] : the mood was normal [No Anxiety] : not feeling anxious [Normal] : normal [Normal Rate] : normal [Rhythm Regular] : regular [Normal S1] : normal S1 [No Gallop] : no gallop heard [Normal S2] : normal S2 [S3] : no S3 [S4] : no S4 [No Murmur] : no murmurs heard [2+] : left 2+ [Right Carotid Bruit] : no bruit heard over the right carotid [Left Carotid Bruit] : no bruit heard over the left carotid [Right Femoral Bruit] : no bruit heard over the right femoral artery [Left Femoral Bruit] : no bruit heard over the left femoral artery [1+] : right 1+ [Bruit] : no bruit heard [___ +] : bilateral [unfilled]U+ pretibial pitting edema [Rt] : varicose veins of the right leg noted [Lt] : varicose veins of the left leg noted

## 2019-10-04 NOTE — HISTORY OF PRESENT ILLNESS
[FreeTextEntry1] : Matt Jeffery presented to the office today for a followup evaluation. He was last seen in the office 6 months ago.\par \par He is now 89 years old, with a history of medically managed severe coronary artery disease. He also has a history of scoliosis, spinal stenosis, lumbosacral radiculopathy and what is likely renal cell CA, followed conservatively. He has an unsteady gait and walks with a cane.  He has a history of hypoxia, likely from hypoventilation, and he is on oxygen. He has a tendency toward relative hypotension. which has been managed with liberalized salt intake.\par \par He does not really report any chest discomfort. His breathing is unchanged. He gets short of breath if he walks more than 50 feet, sometimes a little more or less. His tendency toward edema is unchanged.

## 2019-10-10 PROBLEM — B35.1 ONYCHOMYCOSIS: Status: ACTIVE | Noted: 2019-01-09

## 2019-10-10 PROBLEM — B35.9 TINEA: Status: ACTIVE | Noted: 2019-01-01

## 2019-10-10 NOTE — CONSULT LETTER
[Dear  ___] : Dear  [unfilled], [Consult Letter:] : I had the pleasure of evaluating your patient, [unfilled]. [Please see my note below.] : Please see my note below. [Consult Closing:] : Thank you very much for allowing me to participate in the care of this patient.  If you have any questions, please do not hesitate to contact me. [Sincerely,] : Sincerely, [FreeTextEntry3] : John Mendoza MD\par [facility]

## 2019-10-10 NOTE — HISTORY OF PRESENT ILLNESS
[FreeTextEntry1] : fungus of the nails [de-identified] : 89M with multiple medical problems here for fungus of the nails. 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 When he was younger was on terbinafine x 6 months with resolution but recurred. Has been seeing podiatry. Was seen by PCP who gave ciclopirox which has not helped. \par \par Also with a growth on the R flank that he recently noticed while showering. Asymptomatic. No prior hx of NMSC or melanoma.

## 2019-10-10 NOTE — PHYSICAL EXAM
[Alert] : alert [Oriented x 3] : ~L oriented x 3 [Well Nourished] : well nourished [Conjunctiva Non-injected] : conjunctiva non-injected [No Visual Lymphadenopathy] : no visual  lymphadenopathy [No Clubbing] : no clubbing [No Edema] : no edema [No Bromhidrosis] : no bromhidrosis [No Chromhidrosis] : no chromhidrosis [FreeTextEntry3] : Mocassin-like scale on the bilateral feet with interdigital maceration\par R palmar hand with superficial scale and erythema\par Multiple nailplates of the lower extremities and R hand with thickening and hyperkeratosis

## 2019-10-28 PROBLEM — N18.3 CHRONIC KIDNEY DISEASE, STAGE III (MODERATE): Chronic | Status: ACTIVE | Noted: 2019-01-01

## 2019-10-28 NOTE — PHYSICAL EXAM
[No Acute Distress] : no acute distress [Normal Sclera/Conjunctiva] : normal sclera/conjunctiva [PERRL] : pupils equal round and reactive to light [EOMI] : extraocular movements intact [No Joint Swelling] : no joint swelling [de-identified] : +2 b/l LE edema L>R [de-identified] : erythema without discharge on left ant shin

## 2019-10-28 NOTE — REVIEW OF SYSTEMS
[Lower Ext Edema] : lower extremity edema [Skin Rash] : skin rash [Fever] : no fever [Chills] : no chills [Night Sweats] : no night sweats [Chest Pain] : no chest pain [Palpitations] : no palpitations [Joint Pain] : no joint pain [Itching] : no itching

## 2019-10-28 NOTE — HISTORY OF PRESENT ILLNESS
[Initial Evaluation] : an initial evaluation of [Skin Pain] : skin pain [Skin Redness] : skin redness [Skin Warmth] : skin warmth [Localized Edema] : localized edema [Currently Experiencing] : The patient is currently experiencing symptoms. [Left Leg] : left leg [Gradual] : gradual [None] : There is no known event that preceded symptom onset [Constant] : constantly [Moderate] : moderate in severity [Worsening] : worsening [Direct Pressure] : direct pressure [Fever] : no fever [Chills] : no chills [Skin Ulceration] : no skin ulceration [Serosanguineous Drainage] : no serosanguineous drainage [Purulent Drainage] : no purulent drainage

## 2019-11-04 NOTE — PROCEDURE
[FreeTextEntry1] : Spirometry without bronchodilator Novg 4 2019\par  severe reduction in flow rates\par \par End-tidal CO2 28\par \par PFT without bronchodilator July 26, 2019\par Moderate obstructive ventilatory impairment\par Air-trapping RV/TLC ratio 145% predicted\par Moderate to severe reduction diffusion 52% predicted with a loss of functioning alveolocapillary units\par Hemoglobin 10.4\par Overall pulmonary physiology is stable dating back to 2016\par \par Chest x-ray PA lateral November 4, 2019\par Technically limited and rotated due to patient's anatomy\par Uncoiled aorta\par Borderline cardiomegaly\par Calcification aortic knob\par Right lower lobe scar with volume loss of the right lung\par No evidence of pleural effusions\par Right pleural thickening\par \par \par \par Noted PMD labs mild renal Insuff with creat 1.53\par \par High-dose flu vaccination administered Oct 3  2019\par

## 2019-11-04 NOTE — PHYSICAL EXAM
[General Appearance - Well Developed] : well developed [Well Groomed] : well groomed [General Appearance - Well Nourished] : well nourished [General Appearance - In No Acute Distress] : no acute distress [Normal Conjunctiva] : the conjunctiva exhibited no abnormalities [Normal Oropharynx] : normal oropharynx [Neck Appearance] : the appearance of the neck was normal [Neck Cervical Mass (___cm)] : no neck mass was observed [Jugular Venous Distention Increased] : there was no jugular-venous distention [Heart Rate And Rhythm] : heart rate and rhythm were normal [Heart Sounds] : normal S1 and S2 [Murmurs] : no murmurs present [Respiration, Rhythm And Depth] : normal respiratory rhythm and effort [Exaggerated Use Of Accessory Muscles For Inspiration] : no accessory muscle use [Chest Palpation] : palpation of the chest revealed no abnormalities [Lungs Percussion] : the lungs were normal to percussion [Bowel Sounds] : normal bowel sounds [Abdomen Soft] : soft [Abdomen Tenderness] : non-tender [Abdomen Mass (___ Cm)] : no abdominal mass palpated [Nail Clubbing] : no clubbing of the fingernails [Cyanosis, Localized] : no localized cyanosis [Skin Color & Pigmentation] : normal skin color and pigmentation [] : no rash [No Venous Stasis] : no venous stasis [Skin Lesions] : no skin lesions [No Skin Ulcers] : no skin ulcer [No Xanthoma] : no  xanthoma was observed [Oriented To Time, Place, And Person] : oriented to person, place, and time [Impaired Insight] : insight and judgment were intact [Affect] : the affect was normal [2+ Pitting] : 2+  pitting [FreeTextEntry1] : Coarse BS with mild prolonged expiratory phase with mild crackles  [FreeTextEntry2] : No tenderness, left lower  extremity  erythema

## 2019-11-04 NOTE — DISCUSSION/SUMMARY
[FreeTextEntry1] : COPD\par  ILD\par Hypoxemic respiratory failure\par Pulmonary HTN\par Lower extremity edema\par Borderline Low BP\par ASHD\par s/p lower  extremity cellulitis completed  antibx\par Hold budesonide b/c of voice which has improved\par continue Albuterol\par Patient on long-term oxygen therapy with clinical benefit and improved well-being.  Continue long-term oxygen care as ordered\par \par 1 mos f/u

## 2019-11-04 NOTE — REVIEW OF SYSTEMS
[As Noted in HPI] : as noted in HPI [Edema] : ~T edema was present [Heartburn] : heartburn [Negative] : Endocrine [Hypotension] : no hypotension [Chest Discomfort] : no chest discomfort [PND] : no PND [Palpitations] : no palpitations [Dysphagia] : no dysphagia [Nausea] : no nausea [Vomiting] : no vomiting [Constipation] : no constipation [Diarrhea] : no diarrhea [Abdominal Pain] : no abdominal pain [FreeTextEntry9] : CHF ASHD Diastolic dysfx HLD [de-identified] : Mild Pulmonary HTN

## 2019-11-04 NOTE — REASON FOR VISIT
[Follow-Up] : a follow-up visit [COPD] : COPD [FreeTextEntry2] : HYpoxemic  chronic  respiratory  Failure

## 2019-11-14 NOTE — HISTORY OF PRESENT ILLNESS
[FreeTextEntry1] : f/u cellulitis [de-identified] : Pt is here for f/u of cellulitis. He was treated with an antibiotic and got better, but now a little red again. No pain.

## 2019-11-14 NOTE — REVIEW OF SYSTEMS
[de-identified] : mild erythema left shin and almost radiating around leg. some redder areas around perimeter [Negative] : Heme/Lymph [FreeTextEntry9] : 2-3+ edema LLE 2+ edema RLE

## 2019-12-12 NOTE — PHYSICAL EXAM
[Well Groomed] : well groomed [General Appearance - Well Developed] : well developed [General Appearance - Well Nourished] : well nourished [General Appearance - In No Acute Distress] : no acute distress [Normal Conjunctiva] : the conjunctiva exhibited no abnormalities [Normal Oropharynx] : normal oropharynx [Neck Cervical Mass (___cm)] : no neck mass was observed [Neck Appearance] : the appearance of the neck was normal [Jugular Venous Distention Increased] : there was no jugular-venous distention [Heart Rate And Rhythm] : heart rate and rhythm were normal [Heart Sounds] : normal S1 and S2 [Murmurs] : no murmurs present [Respiration, Rhythm And Depth] : normal respiratory rhythm and effort [Exaggerated Use Of Accessory Muscles For Inspiration] : no accessory muscle use [Lungs Percussion] : the lungs were normal to percussion [Chest Palpation] : palpation of the chest revealed no abnormalities [Abdomen Soft] : soft [Abdomen Tenderness] : non-tender [Bowel Sounds] : normal bowel sounds [Abdomen Mass (___ Cm)] : no abdominal mass palpated [2+ Pitting] : 2+  pitting [Nail Clubbing] : no clubbing of the fingernails [Cyanosis, Localized] : no localized cyanosis [Skin Color & Pigmentation] : normal skin color and pigmentation [] : no rash [No Skin Ulcers] : no skin ulcer [No Venous Stasis] : no venous stasis [Skin Lesions] : no skin lesions [No Xanthoma] : no  xanthoma was observed [Oriented To Time, Place, And Person] : oriented to person, place, and time [Impaired Insight] : insight and judgment were intact [Affect] : the affect was normal [FreeTextEntry1] : Coarse BS with mild prolonged expiratory phase with mild crackles  [FreeTextEntry2] : No tenderness, left lower  extremity  erythema

## 2019-12-12 NOTE — PROCEDURE
[FreeTextEntry1] : Spirometry without bronchodilator 12/12/2019\par  severe reduction in flow rates\par \par End-tidal CO2 33\par \par PFT without bronchodilator July 26, 2019\par Moderate obstructive ventilatory impairment\par Air-trapping RV/TLC ratio 145% predicted\par Moderate to severe reduction diffusion 52% predicted with a loss of functioning alveolocapillary units\par Hemoglobin 10.4\par Overall pulmonary physiology is stable dating back to 2016\par \par Chest x-ray PA lateral November 4, 2019\par Technically limited and rotated due to patient's anatomy\par Uncoiled aorta\par Borderline cardiomegaly\par Calcification aortic knob\par Right lower lobe scar with volume loss of the right lung\par No evidence of pleural effusions\par Right pleural thickening\par \par \par \par Noted PMD labs mild renal Insuff with creat 1.53\par \par High-dose flu vaccination administered Oct 3  2019\par

## 2019-12-12 NOTE — DISCUSSION/SUMMARY
[FreeTextEntry1] : COPD\par  ILD\par Hypoxemic respiratory failure\par Pulmonary HTN\par Lower extremity edema\par Borderline Low BP\par ASHD\par  budesonide b/c of voice which has improved\par continue Albuterol\par Patient on long-term oxygen therapy with clinical benefit and improved well-being.  Continue long-term oxygen care as ordered\par \par 1 mos f/u

## 2019-12-12 NOTE — REVIEW OF SYSTEMS
[As Noted in HPI] : as noted in HPI [Edema] : ~T edema was present [Heartburn] : heartburn [Negative] : Endocrine [Hypotension] : no hypotension [PND] : no PND [Palpitations] : no palpitations [Chest Discomfort] : no chest discomfort [Dysphagia] : no dysphagia [Constipation] : no constipation [Vomiting] : no vomiting [Nausea] : no nausea [Abdominal Pain] : no abdominal pain [Diarrhea] : no diarrhea [FreeTextEntry9] : CHF ASHD Diastolic dysfx HLD [de-identified] : Mild Pulmonary HTN

## 2019-12-12 NOTE — HISTORY OF PRESENT ILLNESS
[None] : ~He/She~ has no significant interval events [Feelings Of Weakness On Exertion] : stable exercise intolerance [Cough] : denies coughing [Difficulty Breathing During Exertion] : stable dyspnea on exertion [Regional Soft Tissue Swelling Both Lower Extremities] : stable lower extremity edema [Wheezing] : denies wheezing [Chest Pain Or Discomfort] : denies chest pain [Fever] : denies fever [Oxygen] : the patient uses supplemental oxygen [Oxygen Rate  ___ lpm] : Oxygen rate is [unfilled] lpm [NC] : Nasal Cannula [Former] : is a former smoker [24 hrs] : 24 hours/day [Wt Gain ___ Lbs] : no recent weight gain [Wt Loss ___ Lbs] : no recent weight loss [FreeTextEntry1] : s/p COPD  flare resolved with Z Sy and steroids and resolved\par Voice change -issue budesonide\par not always able to clear secretions\par Noted cardiac f/u note\par no worsening SOB MCCARTY\par  no wheeze\par no fever chills sweats\par notes right hip degenerative - ORTHO pain  management with po Tramadol\par

## 2020-01-01 ENCOUNTER — TRANSCRIPTION ENCOUNTER (OUTPATIENT)
Age: 85
End: 2020-01-01

## 2020-01-01 ENCOUNTER — APPOINTMENT (OUTPATIENT)
Dept: WOUND CARE | Facility: HOSPITAL | Age: 85
End: 2020-01-01

## 2020-01-01 ENCOUNTER — LABORATORY RESULT (OUTPATIENT)
Age: 85
End: 2020-01-01

## 2020-01-01 ENCOUNTER — APPOINTMENT (OUTPATIENT)
Dept: INTERNAL MEDICINE | Facility: CLINIC | Age: 85
End: 2020-01-01

## 2020-01-01 ENCOUNTER — APPOINTMENT (OUTPATIENT)
Dept: OBGYN | Facility: HOSPITAL | Age: 85
End: 2020-01-01
Payer: MEDICARE

## 2020-01-01 ENCOUNTER — APPOINTMENT (OUTPATIENT)
Dept: CARDIOLOGY | Facility: CLINIC | Age: 85
End: 2020-01-01
Payer: MEDICARE

## 2020-01-01 ENCOUNTER — APPOINTMENT (OUTPATIENT)
Dept: PULMONOLOGY | Facility: CLINIC | Age: 85
End: 2020-01-01
Payer: MEDICARE

## 2020-01-01 ENCOUNTER — APPOINTMENT (OUTPATIENT)
Dept: INTERNAL MEDICINE | Facility: CLINIC | Age: 85
End: 2020-01-01
Payer: MEDICARE

## 2020-01-01 ENCOUNTER — INPATIENT (INPATIENT)
Facility: HOSPITAL | Age: 85
LOS: 3 days | Discharge: HOME | DRG: 682 | End: 2020-08-19
Attending: INTERNAL MEDICINE | Admitting: STUDENT IN AN ORGANIZED HEALTH CARE EDUCATION/TRAINING PROGRAM
Payer: MEDICARE

## 2020-01-01 ENCOUNTER — OUTPATIENT (OUTPATIENT)
Dept: OUTPATIENT SERVICES | Facility: HOSPITAL | Age: 85
LOS: 1 days | Discharge: HOME | End: 2020-01-01
Payer: MEDICARE

## 2020-01-01 ENCOUNTER — APPOINTMENT (OUTPATIENT)
Dept: WOUND CARE | Facility: HOSPITAL | Age: 85
End: 2020-01-01
Payer: MEDICARE

## 2020-01-01 ENCOUNTER — RX RENEWAL (OUTPATIENT)
Age: 85
End: 2020-01-01

## 2020-01-01 ENCOUNTER — NON-APPOINTMENT (OUTPATIENT)
Age: 85
End: 2020-01-01

## 2020-01-01 ENCOUNTER — EMERGENCY (EMERGENCY)
Facility: HOSPITAL | Age: 85
LOS: 1 days | Discharge: HOME | End: 2020-01-01
Attending: EMERGENCY MEDICINE | Admitting: EMERGENCY MEDICINE
Payer: MEDICARE

## 2020-01-01 ENCOUNTER — APPOINTMENT (OUTPATIENT)
Dept: PULMONOLOGY | Facility: CLINIC | Age: 85
End: 2020-01-01

## 2020-01-01 VITALS
DIASTOLIC BLOOD PRESSURE: 69 MMHG | HEART RATE: 82 BPM | TEMPERATURE: 96.9 F | RESPIRATION RATE: 18 BRPM | OXYGEN SATURATION: 100 % | BODY MASS INDEX: 27.31 KG/M2 | HEIGHT: 64 IN | WEIGHT: 160 LBS | SYSTOLIC BLOOD PRESSURE: 110 MMHG

## 2020-01-01 VITALS — SYSTOLIC BLOOD PRESSURE: 104 MMHG | DIASTOLIC BLOOD PRESSURE: 62 MMHG

## 2020-01-01 VITALS
TEMPERATURE: 96.9 F | DIASTOLIC BLOOD PRESSURE: 69 MMHG | BODY MASS INDEX: 27.31 KG/M2 | OXYGEN SATURATION: 100 % | SYSTOLIC BLOOD PRESSURE: 110 MMHG | RESPIRATION RATE: 18 BRPM | HEIGHT: 64 IN | HEART RATE: 82 BPM | WEIGHT: 160 LBS

## 2020-01-01 VITALS
HEIGHT: 64 IN | TEMPERATURE: 96 F | WEIGHT: 154.98 LBS | SYSTOLIC BLOOD PRESSURE: 113 MMHG | OXYGEN SATURATION: 93 % | RESPIRATION RATE: 18 BRPM | HEART RATE: 71 BPM | DIASTOLIC BLOOD PRESSURE: 63 MMHG

## 2020-01-01 VITALS — DIASTOLIC BLOOD PRESSURE: 60 MMHG | HEART RATE: 73 BPM | OXYGEN SATURATION: 93 % | SYSTOLIC BLOOD PRESSURE: 82 MMHG

## 2020-01-01 VITALS
OXYGEN SATURATION: 100 % | SYSTOLIC BLOOD PRESSURE: 79 MMHG | TEMPERATURE: 96 F | DIASTOLIC BLOOD PRESSURE: 51 MMHG | RESPIRATION RATE: 20 BRPM | BODY MASS INDEX: 27.31 KG/M2 | WEIGHT: 160 LBS | HEIGHT: 64 IN | HEART RATE: 76 BPM

## 2020-01-01 VITALS
WEIGHT: 160.06 LBS | OXYGEN SATURATION: 90 % | RESPIRATION RATE: 18 BRPM | TEMPERATURE: 98 F | SYSTOLIC BLOOD PRESSURE: 94 MMHG | DIASTOLIC BLOOD PRESSURE: 57 MMHG | HEART RATE: 90 BPM

## 2020-01-01 VITALS
SYSTOLIC BLOOD PRESSURE: 125 MMHG | OXYGEN SATURATION: 100 % | TEMPERATURE: 98 F | RESPIRATION RATE: 20 BRPM | DIASTOLIC BLOOD PRESSURE: 60 MMHG | HEART RATE: 72 BPM

## 2020-01-01 VITALS
SYSTOLIC BLOOD PRESSURE: 81 MMHG | OXYGEN SATURATION: 97 % | HEART RATE: 59 BPM | DIASTOLIC BLOOD PRESSURE: 49 MMHG | RESPIRATION RATE: 16 BRPM

## 2020-01-01 VITALS
BODY MASS INDEX: 26.63 KG/M2 | OXYGEN SATURATION: 97 % | SYSTOLIC BLOOD PRESSURE: 88 MMHG | RESPIRATION RATE: 14 BRPM | TEMPERATURE: 98.4 F | DIASTOLIC BLOOD PRESSURE: 54 MMHG | WEIGHT: 156 LBS | HEART RATE: 66 BPM | HEIGHT: 64 IN

## 2020-01-01 VITALS
DIASTOLIC BLOOD PRESSURE: 55 MMHG | SYSTOLIC BLOOD PRESSURE: 96 MMHG | TEMPERATURE: 99 F | RESPIRATION RATE: 12 BRPM | OXYGEN SATURATION: 96 % | HEART RATE: 70 BPM

## 2020-01-01 VITALS
HEIGHT: 64 IN | SYSTOLIC BLOOD PRESSURE: 98 MMHG | DIASTOLIC BLOOD PRESSURE: 58 MMHG | TEMPERATURE: 94.4 F | RESPIRATION RATE: 12 BRPM

## 2020-01-01 VITALS
HEIGHT: 64 IN | RESPIRATION RATE: 14 BRPM | SYSTOLIC BLOOD PRESSURE: 90 MMHG | DIASTOLIC BLOOD PRESSURE: 48 MMHG | TEMPERATURE: 97.6 F

## 2020-01-01 VITALS
HEART RATE: 67 BPM | DIASTOLIC BLOOD PRESSURE: 33 MMHG | WEIGHT: 154 LBS | BODY MASS INDEX: 26.29 KG/M2 | SYSTOLIC BLOOD PRESSURE: 69 MMHG | OXYGEN SATURATION: 95 % | HEIGHT: 64 IN

## 2020-01-01 VITALS
SYSTOLIC BLOOD PRESSURE: 96 MMHG | HEART RATE: 81 BPM | BODY MASS INDEX: 27.31 KG/M2 | TEMPERATURE: 97.4 F | OXYGEN SATURATION: 93 % | RESPIRATION RATE: 14 BRPM | DIASTOLIC BLOOD PRESSURE: 58 MMHG | WEIGHT: 160 LBS | HEIGHT: 64 IN

## 2020-01-01 VITALS
HEART RATE: 116 BPM | SYSTOLIC BLOOD PRESSURE: 97 MMHG | RESPIRATION RATE: 22 BRPM | TEMPERATURE: 98 F | OXYGEN SATURATION: 93 % | DIASTOLIC BLOOD PRESSURE: 67 MMHG

## 2020-01-01 VITALS
SYSTOLIC BLOOD PRESSURE: 122 MMHG | OXYGEN SATURATION: 93 % | TEMPERATURE: 97.2 F | RESPIRATION RATE: 20 BRPM | BODY MASS INDEX: 27.31 KG/M2 | WEIGHT: 160 LBS | DIASTOLIC BLOOD PRESSURE: 70 MMHG | HEIGHT: 64 IN | HEART RATE: 72 BPM

## 2020-01-01 VITALS — SYSTOLIC BLOOD PRESSURE: 115 MMHG | DIASTOLIC BLOOD PRESSURE: 65 MMHG

## 2020-01-01 VITALS — SYSTOLIC BLOOD PRESSURE: 85 MMHG | DIASTOLIC BLOOD PRESSURE: 53 MMHG

## 2020-01-01 DIAGNOSIS — L53.8 OTHER SPECIFIED ERYTHEMATOUS CONDITIONS: ICD-10-CM

## 2020-01-01 DIAGNOSIS — R00.2 PALPITATIONS: ICD-10-CM

## 2020-01-01 DIAGNOSIS — Z83.3 FAMILY HISTORY OF DIABETES MELLITUS: ICD-10-CM

## 2020-01-01 DIAGNOSIS — D64.9 ANEMIA, UNSPECIFIED: ICD-10-CM

## 2020-01-01 DIAGNOSIS — I50.31 ACUTE DIASTOLIC (CONGESTIVE) HEART FAILURE: ICD-10-CM

## 2020-01-01 DIAGNOSIS — Z88.1 ALLERGY STATUS TO OTHER ANTIBIOTIC AGENTS STATUS: ICD-10-CM

## 2020-01-01 DIAGNOSIS — Z88.0 ALLERGY STATUS TO PENICILLIN: ICD-10-CM

## 2020-01-01 DIAGNOSIS — L03.116 CELLULITIS OF LEFT LOWER LIMB: ICD-10-CM

## 2020-01-01 DIAGNOSIS — N28.9 DISORDER OF KIDNEY AND URETER, UNSPECIFIED: Chronic | ICD-10-CM

## 2020-01-01 DIAGNOSIS — M48.00 SPINAL STENOSIS, SITE UNSPECIFIED: ICD-10-CM

## 2020-01-01 DIAGNOSIS — Z79.899 OTHER LONG TERM (CURRENT) DRUG THERAPY: ICD-10-CM

## 2020-01-01 DIAGNOSIS — I12.0 HYPERTENSIVE CHRONIC KIDNEY DISEASE WITH STAGE 5 CHRONIC KIDNEY DISEASE OR END STAGE RENAL DISEASE: ICD-10-CM

## 2020-01-01 DIAGNOSIS — Z82.49 FAMILY HISTORY OF ISCHEMIC HEART DISEASE AND OTHER DISEASES OF THE CIRCULATORY SYSTEM: ICD-10-CM

## 2020-01-01 DIAGNOSIS — N18.3 CHRONIC KIDNEY DISEASE, STAGE 3 (MODERATE): ICD-10-CM

## 2020-01-01 DIAGNOSIS — Z85.828 PERSONAL HISTORY OF OTHER MALIGNANT NEOPLASM OF SKIN: ICD-10-CM

## 2020-01-01 DIAGNOSIS — N17.9 ACUTE KIDNEY FAILURE, UNSPECIFIED: ICD-10-CM

## 2020-01-01 DIAGNOSIS — R09.02 HYPOXEMIA: ICD-10-CM

## 2020-01-01 DIAGNOSIS — J84.9 INTERSTITIAL PULMONARY DISEASE, UNSPECIFIED: ICD-10-CM

## 2020-01-01 DIAGNOSIS — M41.9 SCOLIOSIS, UNSPECIFIED: ICD-10-CM

## 2020-01-01 DIAGNOSIS — K59.00 CONSTIPATION, UNSPECIFIED: ICD-10-CM

## 2020-01-01 DIAGNOSIS — I87.2 VENOUS INSUFFICIENCY (CHRONIC) (PERIPHERAL): ICD-10-CM

## 2020-01-01 DIAGNOSIS — Z71.89 OTHER SPECIFIED COUNSELING: ICD-10-CM

## 2020-01-01 DIAGNOSIS — E78.00 PURE HYPERCHOLESTEROLEMIA, UNSPECIFIED: ICD-10-CM

## 2020-01-01 DIAGNOSIS — N18.9 CHRONIC KIDNEY DISEASE, UNSPECIFIED: ICD-10-CM

## 2020-01-01 DIAGNOSIS — I27.20 PULMONARY HYPERTENSION, UNSPECIFIED: ICD-10-CM

## 2020-01-01 DIAGNOSIS — L98.429 NON-PRESSURE CHRONIC ULCER OF BACK WITH UNSPECIFIED SEVERITY: ICD-10-CM

## 2020-01-01 DIAGNOSIS — Z79.82 LONG TERM (CURRENT) USE OF ASPIRIN: ICD-10-CM

## 2020-01-01 DIAGNOSIS — K21.9 GASTRO-ESOPHAGEAL REFLUX DISEASE WITHOUT ESOPHAGITIS: ICD-10-CM

## 2020-01-01 DIAGNOSIS — I13.0 HYPERTENSIVE HEART AND CHRONIC KIDNEY DISEASE WITH HEART FAILURE AND STAGE 1 THROUGH STAGE 4 CHRONIC KIDNEY DISEASE, OR UNSPECIFIED CHRONIC KIDNEY DISEASE: ICD-10-CM

## 2020-01-01 DIAGNOSIS — J44.9 CHRONIC OBSTRUCTIVE PULMONARY DISEASE, UNSPECIFIED: ICD-10-CM

## 2020-01-01 DIAGNOSIS — L89.150 PRESSURE ULCER OF SACRAL REGION, UNSTAGEABLE: ICD-10-CM

## 2020-01-01 DIAGNOSIS — L97.801 NON-PRESSURE CHRONIC ULCER OF OTHER PART OF UNSPECIFIED LOWER LEG LIMITED TO BREAKDOWN OF SKIN: ICD-10-CM

## 2020-01-01 DIAGNOSIS — Z29.9 ENCOUNTER FOR PROPHYLACTIC MEASURES, UNSPECIFIED: ICD-10-CM

## 2020-01-01 DIAGNOSIS — I50.9 HEART FAILURE, UNSPECIFIED: ICD-10-CM

## 2020-01-01 DIAGNOSIS — J40 BRONCHITIS, NOT SPECIFIED AS ACUTE OR CHRONIC: ICD-10-CM

## 2020-01-01 DIAGNOSIS — I25.10 ATHEROSCLEROTIC HEART DISEASE OF NATIVE CORONARY ARTERY WITHOUT ANGINA PECTORIS: ICD-10-CM

## 2020-01-01 DIAGNOSIS — R21 RASH AND OTHER NONSPECIFIC SKIN ERUPTION: ICD-10-CM

## 2020-01-01 DIAGNOSIS — T07.XXXA UNSPECIFIED MULTIPLE INJURIES, INITIAL ENCOUNTER: ICD-10-CM

## 2020-01-01 LAB
ABO RH CONFIRMATION: SIGNIFICANT CHANGE UP
ALBUMIN SERPL ELPH-MCNC: 2.5 G/DL — LOW (ref 3.3–5)
ALBUMIN SERPL ELPH-MCNC: 2.8 G/DL
ALBUMIN SERPL ELPH-MCNC: 2.9 G/DL — LOW (ref 3.3–5)
ALP BLD-CCNC: 78 U/L
ALP SERPL-CCNC: 211 U/L — HIGH (ref 40–120)
ALP SERPL-CCNC: 91 U/L — SIGNIFICANT CHANGE UP (ref 40–120)
ALT FLD-CCNC: 15 U/L — SIGNIFICANT CHANGE UP (ref 12–78)
ALT FLD-CCNC: 33 U/L — SIGNIFICANT CHANGE UP (ref 12–78)
ALT SERPL-CCNC: 8 U/L
ANION GAP SERPL CALC-SCNC: 10 MMOL/L
ANION GAP SERPL CALC-SCNC: 10 MMOL/L — SIGNIFICANT CHANGE UP (ref 5–17)
ANION GAP SERPL CALC-SCNC: 14 MMOL/L
ANION GAP SERPL CALC-SCNC: 14 MMOL/L
ANION GAP SERPL CALC-SCNC: 6 MMOL/L — SIGNIFICANT CHANGE UP (ref 5–17)
ANION GAP SERPL CALC-SCNC: 7 MMOL/L — SIGNIFICANT CHANGE UP (ref 5–17)
ANISOCYTOSIS BLD QL: SLIGHT — SIGNIFICANT CHANGE UP
APPEARANCE UR: CLEAR — SIGNIFICANT CHANGE UP
APTT BLD: 27.7 SEC — LOW (ref 28.5–37)
APTT BLD: 29.2 SEC — SIGNIFICANT CHANGE UP (ref 27.5–35.5)
AST SERPL-CCNC: 12 U/L
AST SERPL-CCNC: 15 U/L — SIGNIFICANT CHANGE UP (ref 15–37)
AST SERPL-CCNC: 43 U/L — HIGH (ref 15–37)
BASOPHILS # BLD AUTO: 0 K/UL
BASOPHILS # BLD AUTO: 0 K/UL
BASOPHILS # BLD AUTO: 0 K/UL — SIGNIFICANT CHANGE UP (ref 0–0.2)
BASOPHILS # BLD AUTO: 0 K/UL — SIGNIFICANT CHANGE UP (ref 0–0.2)
BASOPHILS # BLD AUTO: 0.01 K/UL
BASOPHILS NFR BLD AUTO: 0 %
BASOPHILS NFR BLD AUTO: 0 %
BASOPHILS NFR BLD AUTO: 0 % — SIGNIFICANT CHANGE UP (ref 0–2)
BASOPHILS NFR BLD AUTO: 0 % — SIGNIFICANT CHANGE UP (ref 0–2)
BASOPHILS NFR BLD AUTO: 0.1 %
BILIRUB SERPL-MCNC: 0.5 MG/DL — SIGNIFICANT CHANGE UP (ref 0.2–1.2)
BILIRUB SERPL-MCNC: 0.6 MG/DL
BILIRUB SERPL-MCNC: 0.8 MG/DL — SIGNIFICANT CHANGE UP (ref 0.2–1.2)
BILIRUB UR-MCNC: NEGATIVE — SIGNIFICANT CHANGE UP
BLD GP AB SCN SERPL QL: SIGNIFICANT CHANGE UP
BUN SERPL-MCNC: 33 MG/DL
BUN SERPL-MCNC: 34 MG/DL — HIGH (ref 7–23)
BUN SERPL-MCNC: 48 MG/DL
BUN SERPL-MCNC: 52 MG/DL — HIGH (ref 7–23)
BUN SERPL-MCNC: 55 MG/DL
BUN SERPL-MCNC: 55 MG/DL — HIGH (ref 7–23)
CALCIUM SERPL-MCNC: 8.1 MG/DL
CALCIUM SERPL-MCNC: 8.5 MG/DL — SIGNIFICANT CHANGE UP (ref 8.5–10.1)
CALCIUM SERPL-MCNC: 8.5 MG/DL — SIGNIFICANT CHANGE UP (ref 8.5–10.1)
CALCIUM SERPL-MCNC: 8.6 MG/DL — SIGNIFICANT CHANGE UP (ref 8.5–10.1)
CALCIUM SERPL-MCNC: 8.9 MG/DL
CALCIUM SERPL-MCNC: 9.4 MG/DL
CHLORIDE SERPL-SCNC: 100 MMOL/L
CHLORIDE SERPL-SCNC: 101 MMOL/L — SIGNIFICANT CHANGE UP (ref 96–108)
CHLORIDE SERPL-SCNC: 102 MMOL/L — SIGNIFICANT CHANGE UP (ref 96–108)
CHLORIDE SERPL-SCNC: 103 MMOL/L
CHLORIDE SERPL-SCNC: 105 MMOL/L — SIGNIFICANT CHANGE UP (ref 96–108)
CHLORIDE SERPL-SCNC: 97 MMOL/L
CO2 SERPL-SCNC: 22 MMOL/L
CO2 SERPL-SCNC: 23 MMOL/L
CO2 SERPL-SCNC: 24 MMOL/L
CO2 SERPL-SCNC: 26 MMOL/L — SIGNIFICANT CHANGE UP (ref 22–31)
CO2 SERPL-SCNC: 27 MMOL/L — SIGNIFICANT CHANGE UP (ref 22–31)
CO2 SERPL-SCNC: 30 MMOL/L — SIGNIFICANT CHANGE UP (ref 22–31)
COLOR SPEC: YELLOW — SIGNIFICANT CHANGE UP
CREAT SERPL-MCNC: 1.49 MG/DL
CREAT SERPL-MCNC: 1.5 MG/DL — HIGH (ref 0.5–1.3)
CREAT SERPL-MCNC: 1.54 MG/DL
CREAT SERPL-MCNC: 2 MG/DL — HIGH (ref 0.5–1.3)
CREAT SERPL-MCNC: 2.1 MG/DL — HIGH (ref 0.5–1.3)
CREAT SERPL-MCNC: 2.23 MG/DL
CULTURE RESULTS: SIGNIFICANT CHANGE UP
CULTURE RESULTS: SIGNIFICANT CHANGE UP
DIFF PNL FLD: NEGATIVE — SIGNIFICANT CHANGE UP
EOSINOPHIL # BLD AUTO: 0 K/UL
EOSINOPHIL # BLD AUTO: 0 K/UL — SIGNIFICANT CHANGE UP (ref 0–0.5)
EOSINOPHIL # BLD AUTO: 0 K/UL — SIGNIFICANT CHANGE UP (ref 0–0.5)
EOSINOPHIL # BLD AUTO: 0.01 K/UL
EOSINOPHIL # BLD AUTO: 0.12 K/UL
EOSINOPHIL NFR BLD AUTO: 0 %
EOSINOPHIL NFR BLD AUTO: 0 % — SIGNIFICANT CHANGE UP (ref 0–6)
EOSINOPHIL NFR BLD AUTO: 0 % — SIGNIFICANT CHANGE UP (ref 0–6)
EOSINOPHIL NFR BLD AUTO: 0.1 %
EOSINOPHIL NFR BLD AUTO: 0.9 %
ERYTHROCYTE [SEDIMENTATION RATE] IN BLOOD: 53 MM/HR — HIGH (ref 0–20)
FERRITIN SERPL-MCNC: 119 NG/ML
FERRITIN SERPL-MCNC: 51 NG/ML — SIGNIFICANT CHANGE UP (ref 30–400)
FLUAV SPEC QL CULT: NEGATIVE
FLUBV AG SPEC QL IA: NEGATIVE
FOLATE SERPL-MCNC: 10 NG/ML — SIGNIFICANT CHANGE UP
GLUCOSE BLDC GLUCOMTR-MCNC: 120
GLUCOSE SERPL-MCNC: 109 MG/DL
GLUCOSE SERPL-MCNC: 111 MG/DL — HIGH (ref 70–99)
GLUCOSE SERPL-MCNC: 118 MG/DL — HIGH (ref 70–99)
GLUCOSE SERPL-MCNC: 123 MG/DL
GLUCOSE SERPL-MCNC: 206 MG/DL — HIGH (ref 70–99)
GLUCOSE SERPL-MCNC: 92 MG/DL
GLUCOSE UR QL: NEGATIVE — SIGNIFICANT CHANGE UP
HCT VFR BLD CALC: 26.3 % — LOW (ref 39–50)
HCT VFR BLD CALC: 27.6 % — LOW (ref 39–50)
HCT VFR BLD CALC: 29.4 %
HCT VFR BLD CALC: 30.7 % — LOW (ref 39–50)
HCT VFR BLD CALC: 33.4 %
HCT VFR BLD CALC: 33.7 %
HCT VFR BLD CALC: 6.8 % — CRITICAL LOW (ref 39–50)
HGB BLD-MCNC: 10 G/DL — LOW (ref 13–17)
HGB BLD-MCNC: 10.9 G/DL
HGB BLD-MCNC: 11 G/DL
HGB BLD-MCNC: 6.8 G/DL — CRITICAL LOW (ref 13–17)
HGB BLD-MCNC: 8.2 G/DL — LOW (ref 13–17)
HGB BLD-MCNC: 8.6 G/DL — LOW (ref 13–17)
HGB BLD-MCNC: 9.5 G/DL
HYPOCHROMIA BLD QL: SLIGHT — SIGNIFICANT CHANGE UP
IMM GRANULOCYTES NFR BLD AUTO: 4.6 %
INR BLD: 1.16 RATIO — SIGNIFICANT CHANGE UP (ref 0.88–1.16)
INR BLD: 1.23 RATIO — HIGH (ref 0.88–1.16)
IRON SATN MFR SERPL: 17 UG/DL — LOW (ref 45–165)
IRON SATN MFR SERPL: 5 % — LOW (ref 16–55)
IRON SATN MFR SERPL: 6 %
IRON SERPL-MCNC: 15 UG/DL
KETONES UR-MCNC: NEGATIVE — SIGNIFICANT CHANGE UP
LACTATE SERPL-SCNC: 1.5 MMOL/L — SIGNIFICANT CHANGE UP (ref 0.7–2)
LEUKOCYTE ESTERASE UR-ACNC: NEGATIVE — SIGNIFICANT CHANGE UP
LYMPHOCYTES # BLD AUTO: 0.35 K/UL
LYMPHOCYTES # BLD AUTO: 0.68 K/UL — LOW (ref 1–3.3)
LYMPHOCYTES # BLD AUTO: 0.9 K/UL — LOW (ref 1–3.3)
LYMPHOCYTES # BLD AUTO: 1.11 K/UL
LYMPHOCYTES # BLD AUTO: 1.34 K/UL
LYMPHOCYTES # BLD AUTO: 6 % — LOW (ref 13–44)
LYMPHOCYTES # BLD AUTO: 8 % — LOW (ref 13–44)
LYMPHOCYTES NFR BLD AUTO: 10 %
LYMPHOCYTES NFR BLD AUTO: 2.6 %
LYMPHOCYTES NFR BLD AUTO: 8.8 %
MAGNESIUM SERPL-MCNC: 2.2 MG/DL — SIGNIFICANT CHANGE UP (ref 1.6–2.6)
MAN DIFF?: NORMAL
MANUAL SMEAR VERIFICATION: SIGNIFICANT CHANGE UP
MCHC RBC-ENTMCNC: 25.1 PG — LOW (ref 27–34)
MCHC RBC-ENTMCNC: 25.9 PG — LOW (ref 27–34)
MCHC RBC-ENTMCNC: 26.2 PG — LOW (ref 27–34)
MCHC RBC-ENTMCNC: 30 GM/DL — LOW (ref 32–36)
MCHC RBC-ENTMCNC: 30.1 PG — SIGNIFICANT CHANGE UP (ref 27–34)
MCHC RBC-ENTMCNC: 30.7 PG
MCHC RBC-ENTMCNC: 31 PG
MCHC RBC-ENTMCNC: 31 PG
MCHC RBC-ENTMCNC: 31.2 GM/DL — LOW (ref 32–36)
MCHC RBC-ENTMCNC: 31.2 GM/DL — LOW (ref 32–36)
MCHC RBC-ENTMCNC: 32.3 GM/DL
MCHC RBC-ENTMCNC: 32.6 GM/DL
MCHC RBC-ENTMCNC: 32.6 GM/DL
MCHC RBC-ENTMCNC: 32.6 GM/DL — SIGNIFICANT CHANGE UP (ref 32–36)
MCV RBC AUTO: 22.7 FL — LOW (ref 80–100)
MCV RBC AUTO: 83.1 FL — SIGNIFICANT CHANGE UP (ref 80–100)
MCV RBC AUTO: 84 FL — SIGNIFICANT CHANGE UP (ref 80–100)
MCV RBC AUTO: 92.5 FL — SIGNIFICANT CHANGE UP (ref 80–100)
MCV RBC AUTO: 94.1 FL
MCV RBC AUTO: 94.9 FL
MCV RBC AUTO: 96.1 FL
MONOCYTES # BLD AUTO: 0.88 K/UL
MONOCYTES # BLD AUTO: 1.57 K/UL
MONOCYTES # BLD AUTO: 1.7 K/UL — HIGH (ref 0–0.9)
MONOCYTES # BLD AUTO: 1.79 K/UL — HIGH (ref 0–0.9)
MONOCYTES # BLD AUTO: 1.91 K/UL
MONOCYTES NFR BLD AUTO: 11.5 %
MONOCYTES NFR BLD AUTO: 14.2 %
MONOCYTES NFR BLD AUTO: 15 % — HIGH (ref 2–14)
MONOCYTES NFR BLD AUTO: 16 % — HIGH (ref 2–14)
MONOCYTES NFR BLD AUTO: 7 %
NEUTROPHILS # BLD AUTO: 10.63 K/UL
NEUTROPHILS # BLD AUTO: 11.58 K/UL
NEUTROPHILS # BLD AUTO: 8.3 K/UL — HIGH (ref 1.8–7.4)
NEUTROPHILS # BLD AUTO: 8.96 K/UL — HIGH (ref 1.8–7.4)
NEUTROPHILS # BLD AUTO: 9.53 K/UL
NEUTROPHILS NFR BLD AUTO: 71 %
NEUTROPHILS NFR BLD AUTO: 74 % — SIGNIFICANT CHANGE UP (ref 43–77)
NEUTROPHILS NFR BLD AUTO: 79 % — HIGH (ref 43–77)
NEUTROPHILS NFR BLD AUTO: 84.2 %
NEUTROPHILS NFR BLD AUTO: 85 %
NITRITE UR-MCNC: NEGATIVE — SIGNIFICANT CHANGE UP
NRBC # BLD: 0 /100 WBCS — SIGNIFICANT CHANGE UP (ref 0–0)
NRBC # BLD: 0 /100 WBCS — SIGNIFICANT CHANGE UP (ref 0–0)
NRBC # BLD: 0 — SIGNIFICANT CHANGE UP
NRBC # BLD: SIGNIFICANT CHANGE UP /100 WBCS (ref 0–0)
NRBC # BLD: SIGNIFICANT CHANGE UP /100 WBCS (ref 0–0)
NT-PROBNP SERPL-SCNC: 3214 PG/ML — HIGH (ref 0–450)
NT-PROBNP SERPL-SCNC: 9786 PG/ML — HIGH (ref 0–450)
OB PNL STL: NEGATIVE — SIGNIFICANT CHANGE UP
OB PNL STL: POSITIVE
PH UR: 6 — SIGNIFICANT CHANGE UP (ref 5–8)
PHOSPHATE SERPL-MCNC: 2.1 MG/DL
PHOSPHATE SERPL-MCNC: 3.9 MG/DL — SIGNIFICANT CHANGE UP (ref 2.5–4.5)
PLAT MORPH BLD: NORMAL — SIGNIFICANT CHANGE UP
PLATELET # BLD AUTO: 166 K/UL — SIGNIFICANT CHANGE UP (ref 150–400)
PLATELET # BLD AUTO: 174 K/UL
PLATELET # BLD AUTO: 205 K/UL
PLATELET # BLD AUTO: 236 K/UL — SIGNIFICANT CHANGE UP (ref 150–400)
PLATELET # BLD AUTO: 252 K/UL — SIGNIFICANT CHANGE UP (ref 150–400)
PLATELET # BLD AUTO: 293 K/UL — SIGNIFICANT CHANGE UP (ref 150–400)
PLATELET # BLD AUTO: 299 K/UL
POIKILOCYTOSIS BLD QL AUTO: SLIGHT — SIGNIFICANT CHANGE UP
POTASSIUM SERPL-MCNC: 4.1 MMOL/L — SIGNIFICANT CHANGE UP (ref 3.5–5.3)
POTASSIUM SERPL-MCNC: 4.2 MMOL/L — SIGNIFICANT CHANGE UP (ref 3.5–5.3)
POTASSIUM SERPL-MCNC: 4.5 MMOL/L — SIGNIFICANT CHANGE UP (ref 3.5–5.3)
POTASSIUM SERPL-SCNC: 4.1 MMOL/L — SIGNIFICANT CHANGE UP (ref 3.5–5.3)
POTASSIUM SERPL-SCNC: 4.2 MMOL/L — SIGNIFICANT CHANGE UP (ref 3.5–5.3)
POTASSIUM SERPL-SCNC: 4.5 MMOL/L — SIGNIFICANT CHANGE UP (ref 3.5–5.3)
POTASSIUM SERPL-SCNC: 4.6 MMOL/L
POTASSIUM SERPL-SCNC: 4.7 MMOL/L
POTASSIUM SERPL-SCNC: 5.5 MMOL/L
PROCALCITONIN SERPL-MCNC: 0.14 NG/ML
PROCALCITONIN SERPL-MCNC: 0.14 NG/ML
PROT SERPL-MCNC: 5.1 G/DL
PROT SERPL-MCNC: 6.3 G/DL — SIGNIFICANT CHANGE UP (ref 6–8.3)
PROT SERPL-MCNC: 6.8 G/DL — SIGNIFICANT CHANGE UP (ref 6–8.3)
PROT UR-MCNC: 15
PROTHROM AB SERPL-ACNC: 13.1 SEC — HIGH (ref 10–12.9)
PROTHROM AB SERPL-ACNC: 14.3 SEC — HIGH (ref 10.6–13.6)
RAPID RVP RESULT: DETECTED
RBC # BLD: 2.71 M/UL — LOW (ref 4.2–5.8)
RBC # BLD: 3.06 M/UL
RBC # BLD: 3.13 M/UL — LOW (ref 4.2–5.8)
RBC # BLD: 3.32 M/UL — LOW (ref 4.2–5.8)
RBC # BLD: 3.32 M/UL — LOW (ref 4.2–5.8)
RBC # BLD: 3.52 M/UL
RBC # BLD: 3.58 M/UL
RBC # FLD: 15.2 %
RBC # FLD: 15.6 % — HIGH (ref 10.3–14.5)
RBC # FLD: 15.9 %
RBC # FLD: 16 %
RBC # FLD: 17.5 % — HIGH (ref 10.3–14.5)
RBC # FLD: 17.5 % — HIGH (ref 10.3–14.5)
RBC # FLD: 18.1 % — HIGH (ref 10.3–14.5)
RBC BLD AUTO: ABNORMAL
RV+EV RNA SPEC QL NAA+PROBE: DETECTED
SARS-COV-2 IGG SERPL QL IA: NEGATIVE — SIGNIFICANT CHANGE UP
SARS-COV-2 IGM SERPL IA-ACNC: 0.09 INDEX — SIGNIFICANT CHANGE UP
SARS-COV-2 RNA SPEC QL NAA+PROBE: SIGNIFICANT CHANGE UP
SODIUM SERPL-SCNC: 133 MMOL/L
SODIUM SERPL-SCNC: 137 MMOL/L
SODIUM SERPL-SCNC: 137 MMOL/L
SODIUM SERPL-SCNC: 137 MMOL/L — SIGNIFICANT CHANGE UP (ref 135–145)
SODIUM SERPL-SCNC: 138 MMOL/L — SIGNIFICANT CHANGE UP (ref 135–145)
SODIUM SERPL-SCNC: 139 MMOL/L — SIGNIFICANT CHANGE UP (ref 135–145)
SP GR SPEC: 1 — LOW (ref 1.01–1.02)
SPECIMEN SOURCE: SIGNIFICANT CHANGE UP
SPECIMEN SOURCE: SIGNIFICANT CHANGE UP
TIBC SERPL-MCNC: 234 UG/DL
TIBC SERPL-MCNC: 323 UG/DL — SIGNIFICANT CHANGE UP (ref 220–430)
TROPONIN I SERPL-MCNC: 0.04 NG/ML — SIGNIFICANT CHANGE UP (ref 0.01–0.04)
TROPONIN I SERPL-MCNC: 0.04 NG/ML — SIGNIFICANT CHANGE UP (ref 0.01–0.04)
TROPONIN I SERPL-MCNC: 0.06 NG/ML — HIGH (ref 0.01–0.04)
TSH SERPL-MCNC: 2.86 UIU/ML — SIGNIFICANT CHANGE UP (ref 0.36–3.74)
UIBC SERPL-MCNC: 220 UG/DL
UIBC SERPL-MCNC: 306 UG/DL — SIGNIFICANT CHANGE UP (ref 110–370)
UROBILINOGEN FLD QL: 4
VIT B12 SERPL-MCNC: 754 PG/ML — SIGNIFICANT CHANGE UP (ref 232–1245)
WBC # BLD: 11.21 K/UL — HIGH (ref 3.8–10.5)
WBC # BLD: 11.34 K/UL — HIGH (ref 3.8–10.5)
WBC # BLD: 13.11 K/UL — HIGH (ref 3.8–10.5)
WBC # BLD: 13.6 K/UL — HIGH (ref 3.8–10.5)
WBC # FLD AUTO: 11.21 K/UL — HIGH (ref 3.8–10.5)
WBC # FLD AUTO: 11.34 K/UL — HIGH (ref 3.8–10.5)
WBC # FLD AUTO: 12.62 K/UL
WBC # FLD AUTO: 13.11 K/UL — HIGH (ref 3.8–10.5)
WBC # FLD AUTO: 13.42 K/UL
WBC # FLD AUTO: 13.6 K/UL — HIGH (ref 3.8–10.5)
WBC # FLD AUTO: 13.62 K/UL

## 2020-01-01 PROCEDURE — 86901 BLOOD TYPING SEROLOGIC RH(D): CPT

## 2020-01-01 PROCEDURE — 85730 THROMBOPLASTIN TIME PARTIAL: CPT

## 2020-01-01 PROCEDURE — 94010 BREATHING CAPACITY TEST: CPT

## 2020-01-01 PROCEDURE — 36415 COLL VENOUS BLD VENIPUNCTURE: CPT

## 2020-01-01 PROCEDURE — 99213 OFFICE O/P EST LOW 20 MIN: CPT

## 2020-01-01 PROCEDURE — 71045 X-RAY EXAM CHEST 1 VIEW: CPT | Mod: 26

## 2020-01-01 PROCEDURE — 84484 ASSAY OF TROPONIN QUANT: CPT

## 2020-01-01 PROCEDURE — 99232 SBSQ HOSP IP/OBS MODERATE 35: CPT

## 2020-01-01 PROCEDURE — 84100 ASSAY OF PHOSPHORUS: CPT

## 2020-01-01 PROCEDURE — 85652 RBC SED RATE AUTOMATED: CPT

## 2020-01-01 PROCEDURE — 83735 ASSAY OF MAGNESIUM: CPT

## 2020-01-01 PROCEDURE — 86850 RBC ANTIBODY SCREEN: CPT

## 2020-01-01 PROCEDURE — 97162 PT EVAL MOD COMPLEX 30 MIN: CPT

## 2020-01-01 PROCEDURE — 99443: CPT | Mod: 95

## 2020-01-01 PROCEDURE — 93306 TTE W/DOPPLER COMPLETE: CPT

## 2020-01-01 PROCEDURE — 87040 BLOOD CULTURE FOR BACTERIA: CPT

## 2020-01-01 PROCEDURE — 99213 OFFICE O/P EST LOW 20 MIN: CPT | Mod: 25

## 2020-01-01 PROCEDURE — 99497 ADVNCD CARE PLAN 30 MIN: CPT

## 2020-01-01 PROCEDURE — 85610 PROTHROMBIN TIME: CPT

## 2020-01-01 PROCEDURE — 93000 ELECTROCARDIOGRAM COMPLETE: CPT

## 2020-01-01 PROCEDURE — 99214 OFFICE O/P EST MOD 30 MIN: CPT | Mod: 25

## 2020-01-01 PROCEDURE — 84443 ASSAY THYROID STIM HORMONE: CPT

## 2020-01-01 PROCEDURE — P9016: CPT

## 2020-01-01 PROCEDURE — 83880 ASSAY OF NATRIURETIC PEPTIDE: CPT

## 2020-01-01 PROCEDURE — 96361 HYDRATE IV INFUSION ADD-ON: CPT

## 2020-01-01 PROCEDURE — 99233 SBSQ HOSP IP/OBS HIGH 50: CPT

## 2020-01-01 PROCEDURE — 99212 OFFICE O/P EST SF 10 MIN: CPT

## 2020-01-01 PROCEDURE — 83605 ASSAY OF LACTIC ACID: CPT

## 2020-01-01 PROCEDURE — 81001 URINALYSIS AUTO W/SCOPE: CPT

## 2020-01-01 PROCEDURE — 36430 TRANSFUSION BLD/BLD COMPNT: CPT

## 2020-01-01 PROCEDURE — 85027 COMPLETE CBC AUTOMATED: CPT

## 2020-01-01 PROCEDURE — 82746 ASSAY OF FOLIC ACID SERUM: CPT

## 2020-01-01 PROCEDURE — 83550 IRON BINDING TEST: CPT

## 2020-01-01 PROCEDURE — 99285 EMERGENCY DEPT VISIT HI MDM: CPT

## 2020-01-01 PROCEDURE — G0463: CPT

## 2020-01-01 PROCEDURE — 99498 ADVNCD CARE PLAN ADDL 30 MIN: CPT

## 2020-01-01 PROCEDURE — 71045 X-RAY EXAM CHEST 1 VIEW: CPT

## 2020-01-01 PROCEDURE — 12345: CPT | Mod: NC

## 2020-01-01 PROCEDURE — 99283 EMERGENCY DEPT VISIT LOW MDM: CPT | Mod: 25

## 2020-01-01 PROCEDURE — 87635 SARS-COV-2 COVID-19 AMP PRB: CPT

## 2020-01-01 PROCEDURE — 99214 OFFICE O/P EST MOD 30 MIN: CPT

## 2020-01-01 PROCEDURE — 93010 ELECTROCARDIOGRAM REPORT: CPT

## 2020-01-01 PROCEDURE — 80048 BASIC METABOLIC PNL TOTAL CA: CPT

## 2020-01-01 PROCEDURE — 86900 BLOOD TYPING SEROLOGIC ABO: CPT

## 2020-01-01 PROCEDURE — 86769 SARS-COV-2 COVID-19 ANTIBODY: CPT

## 2020-01-01 PROCEDURE — 82607 VITAMIN B-12: CPT

## 2020-01-01 PROCEDURE — 80053 COMPREHEN METABOLIC PANEL: CPT

## 2020-01-01 PROCEDURE — 99223 1ST HOSP IP/OBS HIGH 75: CPT | Mod: AI

## 2020-01-01 PROCEDURE — 83540 ASSAY OF IRON: CPT

## 2020-01-01 PROCEDURE — 99203 OFFICE O/P NEW LOW 30 MIN: CPT

## 2020-01-01 PROCEDURE — 82962 GLUCOSE BLOOD TEST: CPT

## 2020-01-01 PROCEDURE — 96360 HYDRATION IV INFUSION INIT: CPT

## 2020-01-01 PROCEDURE — 82728 ASSAY OF FERRITIN: CPT

## 2020-01-01 PROCEDURE — 99223 1ST HOSP IP/OBS HIGH 75: CPT

## 2020-01-01 PROCEDURE — 99239 HOSP IP/OBS DSCHRG MGMT >30: CPT

## 2020-01-01 PROCEDURE — 94640 AIRWAY INHALATION TREATMENT: CPT

## 2020-01-01 PROCEDURE — 93005 ELECTROCARDIOGRAM TRACING: CPT

## 2020-01-01 PROCEDURE — 83036 HEMOGLOBIN GLYCOSYLATED A1C: CPT

## 2020-01-01 PROCEDURE — 82272 OCCULT BLD FECES 1-3 TESTS: CPT

## 2020-01-01 PROCEDURE — 86923 COMPATIBILITY TEST ELECTRIC: CPT

## 2020-01-01 PROCEDURE — 87804 INFLUENZA ASSAY W/OPTIC: CPT | Mod: QW

## 2020-01-01 RX ORDER — TRAMADOL HYDROCHLORIDE 50 MG/1
50 TABLET, COATED ORAL
Qty: 28 | Refills: 0 | Status: DISCONTINUED | COMMUNITY
Start: 2019-01-01

## 2020-01-01 RX ORDER — RANOLAZINE 500 MG/1
500 TABLET, FILM COATED, EXTENDED RELEASE ORAL
Refills: 0 | Status: DISCONTINUED | OUTPATIENT
Start: 2020-01-01 | End: 2020-01-01

## 2020-01-01 RX ORDER — SIMETHICONE 80 MG/1
80 TABLET, CHEWABLE ORAL ONCE
Refills: 0 | Status: COMPLETED | OUTPATIENT
Start: 2020-01-01 | End: 2020-01-01

## 2020-01-01 RX ORDER — PANTOPRAZOLE SODIUM 20 MG/1
1 TABLET, DELAYED RELEASE ORAL
Qty: 30 | Refills: 0
Start: 2020-01-01 | End: 2020-09-17

## 2020-01-01 RX ORDER — METOPROLOL TARTRATE 50 MG
0.5 TABLET ORAL
Qty: 30 | Refills: 0
Start: 2020-01-01 | End: 2020-09-17

## 2020-01-01 RX ORDER — FUROSEMIDE 40 MG
40 TABLET ORAL DAILY
Refills: 0 | Status: DISCONTINUED | OUTPATIENT
Start: 2020-01-01 | End: 2020-01-01

## 2020-01-01 RX ORDER — METOPROLOL TARTRATE 50 MG/1
50 TABLET ORAL DAILY
Refills: 0 | Status: DISCONTINUED | COMMUNITY
End: 2020-01-01

## 2020-01-01 RX ORDER — ASPIRIN/CALCIUM CARB/MAGNESIUM 324 MG
1 TABLET ORAL
Qty: 0 | Refills: 0 | DISCHARGE

## 2020-01-01 RX ORDER — BUDESONIDE 0.5 MG/2ML
0.5 INHALANT ORAL
Qty: 60 | Refills: 1 | Status: ACTIVE | COMMUNITY
Start: 2020-01-01 | End: 1900-01-01

## 2020-01-01 RX ORDER — ALBUTEROL SULFATE 2.5 MG/3ML
(2.5 MG/3ML) SOLUTION RESPIRATORY (INHALATION) EVERY 6 HOURS
Qty: 150 | Refills: 3 | Status: ACTIVE | COMMUNITY
Start: 2018-06-13 | End: 1900-01-01

## 2020-01-01 RX ORDER — SODIUM CHLORIDE 9 MG/ML
1000 INJECTION INTRAMUSCULAR; INTRAVENOUS; SUBCUTANEOUS ONCE
Refills: 0 | Status: COMPLETED | OUTPATIENT
Start: 2020-01-01 | End: 2020-01-01

## 2020-01-01 RX ORDER — IPRATROPIUM BROMIDE AND ALBUTEROL SULFATE 2.5; .5 MG/3ML; MG/3ML
0.5-2.5 (3) SOLUTION RESPIRATORY (INHALATION)
Qty: 120 | Refills: 1 | Status: ACTIVE | COMMUNITY
Start: 2020-01-01 | End: 1900-01-01

## 2020-01-01 RX ORDER — DOCUSATE SODIUM 100 MG/1
100 CAPSULE ORAL DAILY
Qty: 30 | Refills: 0 | Status: DISCONTINUED | COMMUNITY
Start: 2019-01-01 | End: 2020-01-01

## 2020-01-01 RX ORDER — BUDESONIDE, MICRONIZED 100 %
2 POWDER (GRAM) MISCELLANEOUS
Qty: 0 | Refills: 0 | DISCHARGE

## 2020-01-01 RX ORDER — HYDROMORPHONE HYDROCHLORIDE 2 MG/ML
0.2 INJECTION INTRAMUSCULAR; INTRAVENOUS; SUBCUTANEOUS EVERY 4 HOURS
Refills: 0 | Status: DISCONTINUED | OUTPATIENT
Start: 2020-01-01 | End: 2020-01-01

## 2020-01-01 RX ORDER — KETOCONAZOLE 20 MG/G
2 CREAM TOPICAL TWICE DAILY
Qty: 1 | Refills: 3 | Status: DISCONTINUED | COMMUNITY
Start: 2019-01-01 | End: 2020-01-01

## 2020-01-01 RX ORDER — BUDESONIDE 0.5 MG/2ML
0.5 INHALANT ORAL
Qty: 60 | Refills: 1 | Status: DISCONTINUED | COMMUNITY
Start: 2020-01-01 | End: 2020-01-01

## 2020-01-01 RX ORDER — ATORVASTATIN CALCIUM 80 MG/1
1 TABLET, FILM COATED ORAL
Qty: 0 | Refills: 0 | DISCHARGE

## 2020-01-01 RX ORDER — FERROUS SULFATE TAB EC 325 MG (65 MG FE EQUIVALENT) 325 (65 FE) MG
325 (65 FE) TABLET DELAYED RESPONSE ORAL DAILY
Qty: 30 | Refills: 2 | Status: DISCONTINUED | COMMUNITY
Start: 2019-01-01 | End: 2020-01-01

## 2020-01-01 RX ORDER — TRAMADOL HYDROCHLORIDE AND ACETAMINOPHEN 37.5; 325 MG/1; MG/1
37.5-325 TABLET, FILM COATED ORAL
Qty: 28 | Refills: 0 | Status: DISCONTINUED | COMMUNITY
Start: 2019-01-01

## 2020-01-01 RX ORDER — FUROSEMIDE 40 MG
40 TABLET ORAL ONCE
Refills: 0 | Status: COMPLETED | OUTPATIENT
Start: 2020-01-01 | End: 2020-01-01

## 2020-01-01 RX ORDER — FENTANYL CITRATE 50 UG/ML
1 INJECTION INTRAVENOUS
Refills: 0 | Status: DISCONTINUED | OUTPATIENT
Start: 2020-01-01 | End: 2020-01-01

## 2020-01-01 RX ORDER — LATANOPROST 0.05 MG/ML
1 SOLUTION/ DROPS OPHTHALMIC; TOPICAL
Qty: 0 | Refills: 0 | DISCHARGE

## 2020-01-01 RX ORDER — BUDESONIDE AND FORMOTEROL FUMARATE DIHYDRATE 160; 4.5 UG/1; UG/1
2 AEROSOL RESPIRATORY (INHALATION)
Refills: 0 | Status: DISCONTINUED | OUTPATIENT
Start: 2020-01-01 | End: 2020-01-01

## 2020-01-01 RX ORDER — BRINZOLAMIDE/BRIMONIDINE TARTRATE 10; 2 MG/ML; MG/ML
1 SUSPENSION/ DROPS OPHTHALMIC
Qty: 0 | Refills: 0 | DISCHARGE

## 2020-01-01 RX ORDER — METOPROLOL TARTRATE 50 MG
0.5 TABLET ORAL
Qty: 0 | Refills: 0 | DISCHARGE

## 2020-01-01 RX ORDER — IPRATROPIUM/ALBUTEROL SULFATE 18-103MCG
3 AEROSOL WITH ADAPTER (GRAM) INHALATION
Qty: 0 | Refills: 0 | DISCHARGE

## 2020-01-01 RX ORDER — FUROSEMIDE 20 MG/1
20 TABLET ORAL DAILY
Qty: 3 | Refills: 0 | Status: ACTIVE | COMMUNITY
Start: 2020-01-01

## 2020-01-01 RX ORDER — ATORVASTATIN CALCIUM 40 MG/1
40 TABLET, FILM COATED ORAL DAILY
Refills: 0 | Status: ACTIVE | COMMUNITY

## 2020-01-01 RX ORDER — TIOTROPIUM BROMIDE 18 UG/1
1 CAPSULE ORAL; RESPIRATORY (INHALATION) DAILY
Refills: 0 | Status: DISCONTINUED | OUTPATIENT
Start: 2020-01-01 | End: 2020-01-01

## 2020-01-01 RX ORDER — ONDANSETRON 8 MG/1
4 TABLET, FILM COATED ORAL ONCE
Refills: 0 | Status: DISCONTINUED | OUTPATIENT
Start: 2020-01-01 | End: 2020-01-01

## 2020-01-01 RX ORDER — IPRATROPIUM BROMIDE 0.5 MG/2.5ML
0.02 SOLUTION RESPIRATORY (INHALATION) 4 TIMES DAILY
Qty: 60 | Refills: 0 | Status: DISCONTINUED | COMMUNITY
Start: 2018-09-24 | End: 2020-01-01

## 2020-01-01 RX ORDER — BUDESONIDE, MICRONIZED 100 %
0 POWDER (GRAM) MISCELLANEOUS
Qty: 0 | Refills: 0 | DISCHARGE

## 2020-01-01 RX ORDER — BUDESONIDE 0.5 MG/2ML
0.5 INHALANT ORAL DAILY
Qty: 60 | Refills: 5 | Status: DISCONTINUED | COMMUNITY
Start: 2018-06-13 | End: 2020-01-01

## 2020-01-01 RX ORDER — MORPHINE SULFATE 50 MG/1
0.5 CAPSULE, EXTENDED RELEASE ORAL ONCE
Refills: 0 | Status: DISCONTINUED | OUTPATIENT
Start: 2020-01-01 | End: 2020-01-01

## 2020-01-01 RX ORDER — FENTANYL CITRATE 50 UG/ML
1 INJECTION INTRAVENOUS
Qty: 10 | Refills: 0
Start: 2020-01-01 | End: 2020-09-17

## 2020-01-01 RX ORDER — FUROSEMIDE 40 MG
1 TABLET ORAL
Qty: 0 | Refills: 0 | DISCHARGE
Start: 2020-01-01

## 2020-01-01 RX ORDER — PANTOPRAZOLE 40 MG/1
40 TABLET, DELAYED RELEASE ORAL
Refills: 0 | Status: ACTIVE | COMMUNITY
Start: 2020-01-01

## 2020-01-01 RX ORDER — METOPROLOL TARTRATE 50 MG
12.5 TABLET ORAL
Refills: 0 | Status: DISCONTINUED | OUTPATIENT
Start: 2020-01-01 | End: 2020-01-01

## 2020-01-01 RX ORDER — ALBUTEROL 90 UG/1
2 AEROSOL, METERED ORAL THREE TIMES A DAY
Refills: 0 | Status: DISCONTINUED | OUTPATIENT
Start: 2020-01-01 | End: 2020-01-01

## 2020-01-01 RX ORDER — SULFAMETHOXAZOLE AND TRIMETHOPRIM 800; 160 MG/1; MG/1
800-160 TABLET ORAL TWICE DAILY
Qty: 14 | Refills: 0 | Status: DISCONTINUED | COMMUNITY
Start: 2020-01-01 | End: 2020-01-01

## 2020-01-01 RX ORDER — RANOLAZINE 500 MG/1
500 TABLET, FILM COATED, EXTENDED RELEASE ORAL
Refills: 0 | Status: DISCONTINUED | COMMUNITY
End: 2020-01-01

## 2020-01-01 RX ORDER — LANSOPRAZOLE 30 MG
SUSPENSION,DELAYED RELEASE,RECONST. ORAL
Refills: 0 | Status: DISCONTINUED | COMMUNITY
End: 2020-01-01

## 2020-01-01 RX ORDER — PANTOPRAZOLE SODIUM 20 MG/1
40 TABLET, DELAYED RELEASE ORAL DAILY
Refills: 0 | Status: DISCONTINUED | OUTPATIENT
Start: 2020-01-01 | End: 2020-01-01

## 2020-01-01 RX ORDER — MIDODRINE HYDROCHLORIDE 5 MG/1
5 TABLET ORAL
Qty: 90 | Refills: 3 | Status: DISCONTINUED | COMMUNITY
Start: 2017-10-04 | End: 2020-01-01

## 2020-01-01 RX ORDER — PREDNISONE 10 MG/1
10 TABLET ORAL
Qty: 30 | Refills: 1 | Status: DISCONTINUED | COMMUNITY
Start: 2020-01-01 | End: 2020-01-01

## 2020-01-01 RX ORDER — CEFUROXIME AXETIL 500 MG/1
500 TABLET ORAL
Qty: 14 | Refills: 0 | Status: DISCONTINUED | COMMUNITY
Start: 2020-01-01 | End: 2020-01-01

## 2020-01-01 RX ORDER — METOPROLOL TARTRATE 50 MG
12.5 TABLET ORAL
Qty: 0 | Refills: 0 | DISCHARGE

## 2020-01-01 RX ORDER — FUROSEMIDE 40 MG
1 TABLET ORAL
Qty: 30 | Refills: 0
Start: 2020-01-01 | End: 2020-09-17

## 2020-01-01 RX ORDER — CEFUROXIME AXETIL 250 MG
500 TABLET ORAL ONCE
Refills: 0 | Status: COMPLETED | OUTPATIENT
Start: 2020-01-01 | End: 2020-01-01

## 2020-01-01 RX ORDER — CHOLECALCIFEROL (VITAMIN D3) 125 MCG
1 CAPSULE ORAL
Qty: 0 | Refills: 0 | DISCHARGE

## 2020-01-01 RX ORDER — LANSOPRAZOLE 15 MG/1
1 CAPSULE, DELAYED RELEASE ORAL
Qty: 0 | Refills: 0 | DISCHARGE

## 2020-01-01 RX ORDER — LATANOPROST 0.05 MG/ML
1 SOLUTION/ DROPS OPHTHALMIC; TOPICAL AT BEDTIME
Refills: 0 | Status: DISCONTINUED | OUTPATIENT
Start: 2020-01-01 | End: 2020-01-01

## 2020-01-01 RX ORDER — BUDESONIDE, MICRONIZED 100 %
0.5 POWDER (GRAM) MISCELLANEOUS
Refills: 0 | Status: DISCONTINUED | OUTPATIENT
Start: 2020-01-01 | End: 2020-01-01

## 2020-01-01 RX ORDER — CICLOPIROX 2.28 G/ML
8 SOLUTION TOPICAL
Qty: 1 | Refills: 2 | Status: DISCONTINUED | COMMUNITY
Start: 2019-01-09 | End: 2020-01-01

## 2020-01-01 RX ORDER — IPRATROPIUM/ALBUTEROL SULFATE 18-103MCG
3 AEROSOL WITH ADAPTER (GRAM) INHALATION
Qty: 360 | Refills: 0
Start: 2020-01-01 | End: 2020-09-17

## 2020-01-01 RX ORDER — METOPROLOL SUCCINATE AND HYDROCHLOROTHIAZIDE 25; 12.5 MG/1; MG/1
25-12.5 TABLET ORAL
Refills: 0 | Status: DISCONTINUED | COMMUNITY
End: 2020-01-01

## 2020-01-01 RX ORDER — RANOLAZINE 500 MG/1
1 TABLET, FILM COATED, EXTENDED RELEASE ORAL
Qty: 60 | Refills: 0
Start: 2020-01-01 | End: 2020-09-17

## 2020-01-01 RX ORDER — ATORVASTATIN CALCIUM 80 MG/1
40 TABLET, FILM COATED ORAL AT BEDTIME
Refills: 0 | Status: DISCONTINUED | OUTPATIENT
Start: 2020-01-01 | End: 2020-01-01

## 2020-01-01 RX ORDER — AZITHROMYCIN 250 MG/1
250 TABLET, FILM COATED ORAL
Qty: 1 | Refills: 0 | Status: DISCONTINUED | COMMUNITY
Start: 2020-01-01 | End: 2020-01-01

## 2020-01-01 RX ORDER — CEFUROXIME AXETIL 500 MG/1
500 TABLET ORAL
Qty: 7 | Refills: 0 | Status: DISCONTINUED | COMMUNITY
Start: 2019-01-01 | End: 2020-01-01

## 2020-01-01 RX ORDER — BENZONATATE 100 MG/1
100 CAPSULE ORAL
Qty: 30 | Refills: 3 | Status: DISCONTINUED | COMMUNITY
Start: 2020-01-01 | End: 2020-01-01

## 2020-01-01 RX ORDER — CEFUROXIME AXETIL 250 MG
1 TABLET ORAL
Qty: 14 | Refills: 0
Start: 2020-01-01 | End: 2020-01-01

## 2020-01-01 RX ORDER — FUROSEMIDE 40 MG
20 TABLET ORAL ONCE
Refills: 0 | Status: COMPLETED | OUTPATIENT
Start: 2020-01-01 | End: 2020-01-01

## 2020-01-01 RX ORDER — LATANOPROST/PF 0.005 %
0.01 DROPS OPHTHALMIC (EYE)
Qty: 3 | Refills: 0 | Status: DISCONTINUED | COMMUNITY
Start: 2017-10-25 | End: 2020-01-01

## 2020-01-01 RX ORDER — FENTANYL CITRATE 50 UG/ML
1 INJECTION INTRAVENOUS
Qty: 0 | Refills: 0 | DISCHARGE
Start: 2020-01-01

## 2020-01-01 RX ADMIN — ALBUTEROL 2 PUFF(S): 90 AEROSOL, METERED ORAL at 12:39

## 2020-01-01 RX ADMIN — RANOLAZINE 500 MILLIGRAM(S): 500 TABLET, FILM COATED, EXTENDED RELEASE ORAL at 06:12

## 2020-01-01 RX ADMIN — TIOTROPIUM BROMIDE 1 CAPSULE(S): 18 CAPSULE ORAL; RESPIRATORY (INHALATION) at 11:10

## 2020-01-01 RX ADMIN — Medication 12.5 MILLIGRAM(S): at 17:30

## 2020-01-01 RX ADMIN — BUDESONIDE AND FORMOTEROL FUMARATE DIHYDRATE 2 PUFF(S): 160; 4.5 AEROSOL RESPIRATORY (INHALATION) at 20:21

## 2020-01-01 RX ADMIN — Medication 40 MILLIGRAM(S): at 06:21

## 2020-01-01 RX ADMIN — RANOLAZINE 500 MILLIGRAM(S): 500 TABLET, FILM COATED, EXTENDED RELEASE ORAL at 17:30

## 2020-01-01 RX ADMIN — Medication 300 MILLIGRAM(S): at 18:50

## 2020-01-01 RX ADMIN — FENTANYL CITRATE 1 PATCH: 50 INJECTION INTRAVENOUS at 11:54

## 2020-01-01 RX ADMIN — FENTANYL CITRATE 1 PATCH: 50 INJECTION INTRAVENOUS at 06:14

## 2020-01-01 RX ADMIN — TIOTROPIUM BROMIDE 1 CAPSULE(S): 18 CAPSULE ORAL; RESPIRATORY (INHALATION) at 07:32

## 2020-01-01 RX ADMIN — RANOLAZINE 500 MILLIGRAM(S): 500 TABLET, FILM COATED, EXTENDED RELEASE ORAL at 17:14

## 2020-01-01 RX ADMIN — BUDESONIDE AND FORMOTEROL FUMARATE DIHYDRATE 2 PUFF(S): 160; 4.5 AEROSOL RESPIRATORY (INHALATION) at 06:30

## 2020-01-01 RX ADMIN — ALBUTEROL 2 PUFF(S): 90 AEROSOL, METERED ORAL at 06:27

## 2020-01-01 RX ADMIN — BUDESONIDE AND FORMOTEROL FUMARATE DIHYDRATE 2 PUFF(S): 160; 4.5 AEROSOL RESPIRATORY (INHALATION) at 18:01

## 2020-01-01 RX ADMIN — PANTOPRAZOLE SODIUM 40 MILLIGRAM(S): 20 TABLET, DELAYED RELEASE ORAL at 17:17

## 2020-01-01 RX ADMIN — SODIUM CHLORIDE 1000 MILLILITER(S): 9 INJECTION INTRAMUSCULAR; INTRAVENOUS; SUBCUTANEOUS at 17:02

## 2020-01-01 RX ADMIN — ALBUTEROL 2 PUFF(S): 90 AEROSOL, METERED ORAL at 06:08

## 2020-01-01 RX ADMIN — RANOLAZINE 500 MILLIGRAM(S): 500 TABLET, FILM COATED, EXTENDED RELEASE ORAL at 06:20

## 2020-01-01 RX ADMIN — Medication 40 MILLIGRAM(S): at 17:17

## 2020-01-01 RX ADMIN — RANOLAZINE 500 MILLIGRAM(S): 500 TABLET, FILM COATED, EXTENDED RELEASE ORAL at 18:00

## 2020-01-01 RX ADMIN — ALBUTEROL 2 PUFF(S): 90 AEROSOL, METERED ORAL at 18:35

## 2020-01-01 RX ADMIN — Medication 40 MILLIGRAM(S): at 05:39

## 2020-01-01 RX ADMIN — Medication 12.5 MILLIGRAM(S): at 17:14

## 2020-01-01 RX ADMIN — HYDROMORPHONE HYDROCHLORIDE 0.2 MILLIGRAM(S): 2 INJECTION INTRAMUSCULAR; INTRAVENOUS; SUBCUTANEOUS at 06:16

## 2020-01-01 RX ADMIN — SIMETHICONE 80 MILLIGRAM(S): 80 TABLET, CHEWABLE ORAL at 17:31

## 2020-01-01 RX ADMIN — ALBUTEROL 2 PUFF(S): 90 AEROSOL, METERED ORAL at 20:20

## 2020-01-01 RX ADMIN — Medication 40 MILLIGRAM(S): at 05:58

## 2020-01-01 RX ADMIN — ALBUTEROL 2 PUFF(S): 90 AEROSOL, METERED ORAL at 07:33

## 2020-01-01 RX ADMIN — Medication 12.5 MILLIGRAM(S): at 05:59

## 2020-01-01 RX ADMIN — ALBUTEROL 2 PUFF(S): 90 AEROSOL, METERED ORAL at 17:14

## 2020-01-01 RX ADMIN — LATANOPROST 1 DROP(S): 0.05 SOLUTION/ DROPS OPHTHALMIC; TOPICAL at 21:26

## 2020-01-01 RX ADMIN — Medication 12.5 MILLIGRAM(S): at 18:00

## 2020-01-01 RX ADMIN — TIOTROPIUM BROMIDE 1 CAPSULE(S): 18 CAPSULE ORAL; RESPIRATORY (INHALATION) at 06:26

## 2020-01-01 RX ADMIN — RANOLAZINE 500 MILLIGRAM(S): 500 TABLET, FILM COATED, EXTENDED RELEASE ORAL at 17:17

## 2020-01-01 RX ADMIN — BUDESONIDE AND FORMOTEROL FUMARATE DIHYDRATE 2 PUFF(S): 160; 4.5 AEROSOL RESPIRATORY (INHALATION) at 07:33

## 2020-01-01 RX ADMIN — RANOLAZINE 500 MILLIGRAM(S): 500 TABLET, FILM COATED, EXTENDED RELEASE ORAL at 05:59

## 2020-01-01 RX ADMIN — Medication 20 MILLIGRAM(S): at 13:03

## 2020-01-01 RX ADMIN — ALBUTEROL 2 PUFF(S): 90 AEROSOL, METERED ORAL at 07:12

## 2020-01-01 RX ADMIN — Medication 500 MILLIGRAM(S): at 17:32

## 2020-01-01 RX ADMIN — PANTOPRAZOLE SODIUM 40 MILLIGRAM(S): 20 TABLET, DELAYED RELEASE ORAL at 12:40

## 2020-01-01 RX ADMIN — LATANOPROST 1 DROP(S): 0.05 SOLUTION/ DROPS OPHTHALMIC; TOPICAL at 21:01

## 2020-01-01 RX ADMIN — TIOTROPIUM BROMIDE 1 CAPSULE(S): 18 CAPSULE ORAL; RESPIRATORY (INHALATION) at 06:14

## 2020-01-01 RX ADMIN — MORPHINE SULFATE 0.5 MILLIGRAM(S): 50 CAPSULE, EXTENDED RELEASE ORAL at 21:40

## 2020-01-01 RX ADMIN — PANTOPRAZOLE SODIUM 40 MILLIGRAM(S): 20 TABLET, DELAYED RELEASE ORAL at 12:00

## 2020-01-01 RX ADMIN — Medication 0.5 MILLIGRAM(S): at 19:48

## 2020-01-01 RX ADMIN — LATANOPROST 1 DROP(S): 0.05 SOLUTION/ DROPS OPHTHALMIC; TOPICAL at 21:48

## 2020-01-01 RX ADMIN — FENTANYL CITRATE 1 PATCH: 50 INJECTION INTRAVENOUS at 20:24

## 2020-01-01 RX ADMIN — RANOLAZINE 500 MILLIGRAM(S): 500 TABLET, FILM COATED, EXTENDED RELEASE ORAL at 05:39

## 2020-01-01 RX ADMIN — BUDESONIDE AND FORMOTEROL FUMARATE DIHYDRATE 2 PUFF(S): 160; 4.5 AEROSOL RESPIRATORY (INHALATION) at 06:12

## 2020-01-01 RX ADMIN — ATORVASTATIN CALCIUM 40 MILLIGRAM(S): 80 TABLET, FILM COATED ORAL at 21:26

## 2020-01-01 RX ADMIN — PANTOPRAZOLE SODIUM 40 MILLIGRAM(S): 20 TABLET, DELAYED RELEASE ORAL at 11:43

## 2020-01-01 RX ADMIN — MORPHINE SULFATE 0.5 MILLIGRAM(S): 50 CAPSULE, EXTENDED RELEASE ORAL at 20:47

## 2020-01-01 RX ADMIN — Medication 12.5 MILLIGRAM(S): at 05:39

## 2020-01-01 RX ADMIN — BUDESONIDE AND FORMOTEROL FUMARATE DIHYDRATE 2 PUFF(S): 160; 4.5 AEROSOL RESPIRATORY (INHALATION) at 17:15

## 2020-01-01 RX ADMIN — PANTOPRAZOLE SODIUM 40 MILLIGRAM(S): 20 TABLET, DELAYED RELEASE ORAL at 11:07

## 2020-01-01 RX ADMIN — FENTANYL CITRATE 1 PATCH: 50 INJECTION INTRAVENOUS at 09:16

## 2020-01-01 RX ADMIN — Medication 12.5 MILLIGRAM(S): at 06:12

## 2020-01-01 RX ADMIN — HYDROMORPHONE HYDROCHLORIDE 0.2 MILLIGRAM(S): 2 INJECTION INTRAMUSCULAR; INTRAVENOUS; SUBCUTANEOUS at 04:55

## 2020-01-01 RX ADMIN — Medication 40 MILLIGRAM(S): at 11:07

## 2020-01-01 RX ADMIN — Medication 12.5 MILLIGRAM(S): at 06:20

## 2020-01-01 RX ADMIN — SODIUM CHLORIDE 1000 MILLILITER(S): 9 INJECTION INTRAMUSCULAR; INTRAVENOUS; SUBCUTANEOUS at 15:15

## 2020-01-01 RX ADMIN — LATANOPROST 1 DROP(S): 0.05 SOLUTION/ DROPS OPHTHALMIC; TOPICAL at 23:21

## 2020-01-23 NOTE — PROCEDURE
[FreeTextEntry1] : Spirometry without bronchodilator 12/12/2019\par  severe reduction in flow rates\par interval improvement at FVC\par \par End-tidal CO2 32\par \par PFT without bronchodilator July 26, 2019\par Moderate obstructive ventilatory impairment\par Air-trapping RV/TLC ratio 145% predicted\par Moderate to severe reduction diffusion 52% predicted with a loss of functioning alveolocapillary units\par Hemoglobin 10.4\par Overall pulmonary physiology is stable dating back to 2016\par \par Chest x-ray PA lateral November 4, 2019\par Technically limited and rotated due to patient's anatomy\par Uncoiled aorta\par Borderline cardiomegaly\par Calcification aortic knob\par Right lower lobe scar with volume loss of the right lung\par No evidence of pleural effusions\par Right pleural thickening\par \par \par \par Noted PMD labs mild renal Insuff with creat 1.53\par \par High-dose flu vaccination administered Oct 3  2019\par

## 2020-01-23 NOTE — REVIEW OF SYSTEMS
[As Noted in HPI] : as noted in HPI [Edema] : ~T edema was present [Heartburn] : heartburn [Negative] : Endocrine [Hypotension] : no hypotension [Chest Discomfort] : no chest discomfort [PND] : no PND [Palpitations] : no palpitations [Dysphagia] : no dysphagia [Nausea] : no nausea [Vomiting] : no vomiting [Constipation] : no constipation [Diarrhea] : no diarrhea [Abdominal Pain] : no abdominal pain [de-identified] : Mild Pulmonary HTN [FreeTextEntry9] : CHF ASHD Diastolic dysfx HLD

## 2020-01-23 NOTE — PHYSICAL EXAM
[General Appearance - Well Developed] : well developed [Well Groomed] : well groomed [General Appearance - Well Nourished] : well nourished [General Appearance - In No Acute Distress] : no acute distress [Normal Conjunctiva] : the conjunctiva exhibited no abnormalities [Normal Oropharynx] : normal oropharynx [Neck Appearance] : the appearance of the neck was normal [Neck Cervical Mass (___cm)] : no neck mass was observed [Jugular Venous Distention Increased] : there was no jugular-venous distention [Heart Sounds] : normal S1 and S2 [Heart Rate And Rhythm] : heart rate and rhythm were normal [Murmurs] : no murmurs present [Respiration, Rhythm And Depth] : normal respiratory rhythm and effort [Chest Palpation] : palpation of the chest revealed no abnormalities [Exaggerated Use Of Accessory Muscles For Inspiration] : no accessory muscle use [Bowel Sounds] : normal bowel sounds [Lungs Percussion] : the lungs were normal to percussion [Abdomen Soft] : soft [Abdomen Tenderness] : non-tender [Abdomen Mass (___ Cm)] : no abdominal mass palpated [Nail Clubbing] : no clubbing of the fingernails [Cyanosis, Localized] : no localized cyanosis [] : no rash [Skin Color & Pigmentation] : normal skin color and pigmentation [No Venous Stasis] : no venous stasis [Skin Lesions] : no skin lesions [No Skin Ulcers] : no skin ulcer [No Xanthoma] : no  xanthoma was observed [Oriented To Time, Place, And Person] : oriented to person, place, and time [Impaired Insight] : insight and judgment were intact [Affect] : the affect was normal [FreeTextEntry1] : Coarse BS with mild prolonged expiratory phase with mild crackles  [FreeTextEntry2] : No tenderness, left lower  extremity  erythema edema resolved, left varicose veins

## 2020-01-23 NOTE — HISTORY OF PRESENT ILLNESS
[None] : ~He/She~ has no significant interval events [Difficulty Breathing During Exertion] : stable dyspnea on exertion [Feelings Of Weakness On Exertion] : stable exercise intolerance [Cough] : denies coughing [Wheezing] : denies wheezing [Regional Soft Tissue Swelling Both Lower Extremities] : stable lower extremity edema [Chest Pain Or Discomfort] : denies chest pain [Fever] : denies fever [Oxygen] : the patient uses supplemental oxygen [Oxygen Rate  ___ lpm] : Oxygen rate is [unfilled] lpm [NC] : Nasal Cannula [24 hrs] : 24 hours/day [Former] : is a former smoker [Wt Gain ___ Lbs] : no recent weight gain [Wt Loss ___ Lbs] : no recent weight loss [FreeTextEntry1] : \par Voice change -issue budesonide\par not always able to clear secretions\par Noted cardiac f/u note\par no worsening SOB MCCARTY\par  no wheeze\par no fever chills sweats\par notes right hip degenerative - ORTHO pain  management with po Tramadol\par

## 2020-01-23 NOTE — DISCUSSION/SUMMARY
[FreeTextEntry1] : COPD\par  ILD\par Hypoxemic respiratory failure\par Pulmonary HTN\par Lower extremity edema\par Borderline Low BP\par ASHD\par s/p lower  extremity cellulitis completed  antibx\par Hold budesonide b/c of voice which has improved\par continue Albuterol with inhaled atrovent\par Patient on long-term oxygen therapy with clinical benefit and improved well-being.  Continue long-term oxygen care as ordered\par \par 1 mos f/u

## 2020-01-23 NOTE — REASON FOR VISIT
[Follow-Up] : a follow-up visit [COPD] : COPD [FreeTextEntry2] : Chronic  respiratory failure with hypoxemia

## 2020-02-04 NOTE — ED PROVIDER NOTE - PSH
Jaye Burnham is a 60 year old female who returns for followup now approximately 2 weeks status post right shoulder arthroscopy with arthroscopic acromioplasty and rotator cuff repair. Pain and function are slowly improving.    Examination of the shoulder reveals the portals are benign and healing well. The surrounding skin and soft tissues are benign. There is a grossly intact neurological examination distally in the operative extremity.    Assessment/Plan: Doing well. At this point, the patient can get the wounds wet. They will remain in the UltraSling and continue twice a day elbow and Codman's exercises until approximately 4 weeks from the date of surgery at which point we will discontinue the sling and begin physical therapy for the rotator cuff repair protocol. They will try to wean off narcotic medications if and when this is possible and will return to my clinic in approximately 4 weeks for reevaluation.     H/O inguinal hernia repair

## 2020-02-13 NOTE — PROCEDURE
[FreeTextEntry1] : Spirometry without bronchodilator 12/12/2019\par  severe reduction in flow rates\par interval improvement at FVC\par \par End-tidal CO2 32\par \par PFT without bronchodilator July 26, 2019\par Moderate obstructive ventilatory impairment\par Air-trapping RV/TLC ratio 145% predicted\par Moderate to severe reduction diffusion 52% predicted with a loss of functioning alveolocapillary units\par Hemoglobin 10.4\par Overall pulmonary physiology is stable dating back to 2016\par \par Chest x-ray PA lateral February 12, 2019\par Cardiomegaly\par Data right hemidiaphragm with dilated colon\par Prominent interstitial markings without evidence of active congestive heart failure\par No pneumothorax\par Calcification aortic knob\par No consolidation infiltrate\par Compared to chest x-ray of November 4, 2019 no significant interval change\par \par WBC 13.62 hemoglobin 10.9 hematocrit 33.4 platelet count 205,000\par Procalcitonin just above normal range 0.14\par Review\par BUN 48 creatinine 1.54 noted prior renal function relatively unchanged\par Serum electrolytes normal\par Serum calcium 8.9\par RVP rapid positive for entero-rhinovirus\par \par

## 2020-02-13 NOTE — PHYSICAL EXAM
[General Appearance - Well Developed] : well developed [Well Groomed] : well groomed [General Appearance - Well Nourished] : well nourished [Normal Conjunctiva] : the conjunctiva exhibited no abnormalities [General Appearance - In No Acute Distress] : no acute distress [Normal Oropharynx] : normal oropharynx [Neck Appearance] : the appearance of the neck was normal [Heart Rate And Rhythm] : heart rate and rhythm were normal [Neck Cervical Mass (___cm)] : no neck mass was observed [Jugular Venous Distention Increased] : there was no jugular-venous distention [Murmurs] : no murmurs present [Heart Sounds] : normal S1 and S2 [Respiration, Rhythm And Depth] : normal respiratory rhythm and effort [Exaggerated Use Of Accessory Muscles For Inspiration] : no accessory muscle use [Chest Palpation] : palpation of the chest revealed no abnormalities [Lungs Percussion] : the lungs were normal to percussion [Bowel Sounds] : normal bowel sounds [Abdomen Tenderness] : non-tender [Abdomen Soft] : soft [Abdomen Mass (___ Cm)] : no abdominal mass palpated [Nail Clubbing] : no clubbing of the fingernails [Skin Color & Pigmentation] : normal skin color and pigmentation [Cyanosis, Localized] : no localized cyanosis [No Venous Stasis] : no venous stasis [] : no rash [Skin Lesions] : no skin lesions [No Skin Ulcers] : no skin ulcer [No Xanthoma] : no  xanthoma was observed [Affect] : the affect was normal [Impaired Insight] : insight and judgment were intact [Oriented To Time, Place, And Person] : oriented to person, place, and time [FreeTextEntry1] : Coarse BS with mild prolonged expiratory phase with mild crackles  [FreeTextEntry2] : No tenderness, left lower  extremity  erythema edema resolved, left varicose veins

## 2020-02-13 NOTE — HISTORY OF PRESENT ILLNESS
[Difficulty Breathing During Exertion] : stable dyspnea on exertion [None] : ~He/She~ has no significant interval events [Wheezing] : denies wheezing [Cough] : denies coughing [Feelings Of Weakness On Exertion] : stable exercise intolerance [Chest Pain Or Discomfort] : denies chest pain [Regional Soft Tissue Swelling Both Lower Extremities] : stable lower extremity edema [Fever] : denies fever [Oxygen] : the patient uses supplemental oxygen [Oxygen Rate  ___ lpm] : Oxygen rate is [unfilled] lpm [24 hrs] : 24 hours/day [NC] : Nasal Cannula [Former] : is a former smoker [Wt Gain ___ Lbs] : no recent weight gain [Wt Loss ___ Lbs] : no recent weight loss [FreeTextEntry1] : viral like illness \par increased chest  congestion\par at one point Temp 99 2\par Increased cough with difficulty clearing secretions\par Voice change -issue budesonide\par not always able to clear secretions\par Noted cardiac f/u note\par no worsening SOB MCCARTY\par  no wheeze\par no fever chills sweats\par notes right hip degenerative - ORTHO pain  management with po Tramadol\par

## 2020-02-13 NOTE — REVIEW OF SYSTEMS
[As Noted in HPI] : as noted in HPI [Heartburn] : heartburn [Edema] : ~T edema was present [Negative] : Endocrine [Hypotension] : no hypotension [Chest Discomfort] : no chest discomfort [PND] : no PND [Palpitations] : no palpitations [Dysphagia] : no dysphagia [Nausea] : no nausea [Vomiting] : no vomiting [Constipation] : no constipation [Diarrhea] : no diarrhea [Abdominal Pain] : no abdominal pain [FreeTextEntry9] : CHF ASHD Diastolic dysfx HLD [de-identified] : Mild Pulmonary HTN

## 2020-02-13 NOTE — DISCUSSION/SUMMARY
[FreeTextEntry1] : Bronchitic exacerbation no clear evidence of pneumonia on a plain radiograph\par Mucus plugging mucoid impaction with difficulty clearing secretions\par Mild leukocytosis with mild elevation procalcitonin-antibiotic ordered\par Repeat positive entero-rhinovirus\par COPD\par  ILD\par Hypoxemic respiratory failure\par Pulmonary HTN\par Lower extremity edema\par Borderline Low BP\par ASHD\par continue Albuterol with inhaled atrovent\par Patient on long-term oxygen therapy with clinical benefit and improved well-being.  Continue long-term oxygen care as ordered\par Increased use of albuterol and Atrovent to 4 times daily to help clear secretions\par Restart budesonide inhaler twice daily\par Added p.o. prednisone 40 mg with every 3 10 mg taper\par Antibiotic Bactrim DS 1 tablet p.o. twice daily due to noted allergies avoid penicillins cephalosporins and doxycycline\par Advised to go to [place] [facility] immediately if any deterioration ration of respiratory status\par This follow-up 1 week

## 2020-02-20 NOTE — PHYSICAL EXAM
[General Appearance - Well Developed] : well developed [Well Groomed] : well groomed [General Appearance - Well Nourished] : well nourished [General Appearance - In No Acute Distress] : no acute distress [Normal Conjunctiva] : the conjunctiva exhibited no abnormalities [Normal Oropharynx] : normal oropharynx [Neck Appearance] : the appearance of the neck was normal [Jugular Venous Distention Increased] : there was no jugular-venous distention [Neck Cervical Mass (___cm)] : no neck mass was observed [Heart Sounds] : normal S1 and S2 [Heart Rate And Rhythm] : heart rate and rhythm were normal [Murmurs] : no murmurs present [Exaggerated Use Of Accessory Muscles For Inspiration] : no accessory muscle use [Chest Palpation] : palpation of the chest revealed no abnormalities [Respiration, Rhythm And Depth] : normal respiratory rhythm and effort [Lungs Percussion] : the lungs were normal to percussion [Abdomen Soft] : soft [Bowel Sounds] : normal bowel sounds [Abdomen Tenderness] : non-tender [Abdomen Mass (___ Cm)] : no abdominal mass palpated [Nail Clubbing] : no clubbing of the fingernails [Skin Color & Pigmentation] : normal skin color and pigmentation [Cyanosis, Localized] : no localized cyanosis [No Venous Stasis] : no venous stasis [] : no rash [Skin Lesions] : no skin lesions [Oriented To Time, Place, And Person] : oriented to person, place, and time [No Skin Ulcers] : no skin ulcer [No Xanthoma] : no  xanthoma was observed [Impaired Insight] : insight and judgment were intact [Affect] : the affect was normal [FreeTextEntry1] : Coarse BS with mild prolonged expiratory phase with mild crackles  [FreeTextEntry2] : No tenderness, left lower  extremity  erythema edema resolved, left varicose veins

## 2020-02-20 NOTE — HISTORY OF PRESENT ILLNESS
[None] : ~He/She~ has no significant interval events [Difficulty Breathing During Exertion] : stable dyspnea on exertion [Feelings Of Weakness On Exertion] : stable exercise intolerance [Cough] : denies coughing [Wheezing] : denies wheezing [Chest Pain Or Discomfort] : denies chest pain [Regional Soft Tissue Swelling Both Lower Extremities] : stable lower extremity edema [Fever] : denies fever [Oxygen Rate  ___ lpm] : Oxygen rate is [unfilled] lpm [Oxygen] : the patient uses supplemental oxygen [Former] : is a former smoker [24 hrs] : 24 hours/day [NC] : Nasal Cannula [Wt Gain ___ Lbs] : no recent weight gain [Wt Loss ___ Lbs] : no recent weight loss [FreeTextEntry1] : viral like illness -enterovirus\par To complete a course of antibiotic and is on tapering dose of steroids with significant increased frequency\par Still with nocturnal symptomatology and difficulty clearing secretions\par No fevers or chills reported\par \par increased chest  congestion\par at one point Temp 99 2\par Increased cough with difficulty clearing secretions\par Voice change -issue budesonide\par not always able to clear secretions\par Noted cardiac f/u note\par no worsening SOB MCCARTY\par  no wheeze\par no fever chills sweats\par notes right hip degenerative - ORTHO pain  management with po Tramadol\par

## 2020-02-20 NOTE — REVIEW OF SYSTEMS
[As Noted in HPI] : as noted in HPI [Edema] : ~T edema was present [Heartburn] : heartburn [Negative] : Allergy/Immunology [Hypotension] : no hypotension [Chest Discomfort] : no chest discomfort [Palpitations] : no palpitations [PND] : no PND [Dysphagia] : no dysphagia [Nausea] : no nausea [Constipation] : no constipation [Diarrhea] : no diarrhea [Vomiting] : no vomiting [Abdominal Pain] : no abdominal pain [FreeTextEntry9] : CHF ASHD Diastolic dysfx HLD [de-identified] : Mild Pulmonary HTN

## 2020-02-20 NOTE — PROCEDURE
[FreeTextEntry1] : [id number] February 20, 2020\par \par Spirometry without bronchodilator 12/12/2019\par  severe reduction in flow rates\par interval improvement at FVC\par \par End-tidal CO2 32\par \par PFT without bronchodilator July 26, 2019\par Moderate obstructive ventilatory impairment\par Air-trapping RV/TLC ratio 145% predicted\par Moderate to severe reduction diffusion 52% predicted with a loss of functioning alveolocapillary units\par Hemoglobin 10.4\par Overall pulmonary physiology is stable dating back to 2016\par \par Chest x-ray PA lateral February 12, 2020\par Cardiomegaly\par Elevated  right hemidiaphragm with dilated colon\par Prominent interstitial markings without evidence of active congestive heart failure\par No pneumothorax\par Calcification aortic knob\par No consolidation infiltrate\par Compared to chest x-ray of November 4, 2019 no significant interval change\par \par WBC 13.62 hemoglobin 10.9 hematocrit 33.4 platelet count 205,000\par Procalcitonin just above normal range 0.14\par Review\par BUN 48 creatinine 1.54 noted prior renal function relatively unchanged\par Serum electrolytes normal\par Serum calcium 8.9\par RVP rapid positive for entero-rhinovirus\par \par

## 2020-02-20 NOTE — DISCUSSION/SUMMARY
[FreeTextEntry1] : Bronchitic exacerbation no clear evidence of pneumonia on a plain radiograph\par Mucus plugging mucoid impaction with difficulty clearing secretions\par Mild leukocytosis with mild elevation procalcitonin-antibiotic ordered\par Repeat positive entero-rhinovirus\par COPD\par  ILD\par Hypoxemic respiratory failure\par Pulmonary HTN\par Lower extremity edema\par Borderline Low BP\par ASHD\par continue Albuterol with inhaled atrovent\par Patient on long-term oxygen therapy with clinical benefit and improved well-being.  Continue long-term oxygen care as ordered\par Increased use of albuterol and Atrovent to 4 times daily to help clear secretions\par Restart budesonide inhaler twice daily\par Added p.o. prednisone 40 mg with every 3 10 mg taper- STOP\par Antibiotic Bactrim DS 1 tablet p.o. twice daily due to noted allergies avoid penicillins cephalosporins and doxycycline- completed\par Advised to go to [facility] immediately if any deterioration ration of respiratory status\par For cough added Tessalon perles 100 mg tid\par f/u  4 weeks

## 2020-02-21 PROBLEM — R21 SKIN RASH: Status: ACTIVE | Noted: 2020-01-01

## 2020-02-21 PROBLEM — N28.9 KIDNEY DISEASE: Chronic | Status: ACTIVE | Noted: 2020-01-01

## 2020-02-21 NOTE — PHYSICAL EXAM
[No Edema] : there was no peripheral edema [Normal] : affect was normal and insight and judgment were intact [de-identified] : sitting comfortably with oxygen on in wheel chair [de-identified] : intergluteal erythema w/o skin breakdown

## 2020-02-21 NOTE — HISTORY OF PRESENT ILLNESS
[FreeTextEntry1] : bronchitis f/u [de-identified] : Pt was treated with bactrim and prednisone for bronchitis until this week. He is much better now and has a little cough. He c/o soreness in buttocks area. \par He has had constipation and went several days. A fleets enema worked well. Last BM was 2 days ago. He has been taking colace. He stopped iron recently b/o constipation. Appetite is good and has been urinating normally.

## 2020-02-26 NOTE — ED PROVIDER NOTE - NSFOLLOWUPINSTRUCTIONS_ED_ALL_ED_FT
please follow up with Dr. Hoffman/ Dr. Dawson tomorrow morning return to er for any worsening symptoms

## 2020-02-26 NOTE — ED ADULT NURSE NOTE - OBJECTIVE STATEMENT
sent by pmd reports dark tarry stool sent by pmd reports dark tarry stool low hgb, b/l lower extremity edema left leg erythema

## 2020-02-26 NOTE — REVIEW OF SYSTEMS
[Lower Ext Edema] : lower extremity edema [Melena] : melchristi [Chills] : no chills [Fever] : no fever [Night Sweats] : no night sweats [Chest Pain] : no chest pain [Palpitations] : no palpitations [Shortness Of Breath] : no shortness of breath [Wheezing] : no wheezing [Cough] : no cough [Dyspnea on Exertion] : not dyspnea on exertion [Abdominal Pain] : no abdominal pain [Nausea] : no nausea [Constipation] : no constipation [Diarrhea] : no diarrhea [Vomiting] : no vomiting [Dysuria] : no dysuria

## 2020-02-26 NOTE — ASSESSMENT
[FreeTextEntry1] : Anemia, cellulitis: Concern for upper GI bleed. Sent patient to  ED and discussed with triage nurse.

## 2020-02-26 NOTE — ED PROVIDER NOTE - ATTENDING CONTRIBUTION TO CARE
89 yo white male seen by PCP today and found to have/complaining of dark stool and low H and H. No chest pain. No abdominal pains. No BRBPR. No dizziness or lightheadedness. Exam revealed a frail elderly white male in NAD with clear lungs, normal heart sounds and benign and non tender abdomen. LLE did have venous stasis changes to LLE. I agree with plan and management outlined by PA.

## 2020-02-26 NOTE — ED PROVIDER NOTE - CARE PROVIDER_API CALL
Nida Hoffman)  Internal Medicine  [address] [place] [address]  Phone: [phone]  Fax: [phone]  Follow Up Time:

## 2020-02-26 NOTE — PHYSICAL EXAM
[No Respiratory Distress] : no respiratory distress  [No Acute Distress] : no acute distress [No Accessory Muscle Use] : no accessory muscle use [Regular Rhythm] : with a regular rhythm [Normal Rate] : normal rate  [Normal S1, S2] : normal S1 and S2 [de-identified] : +2 edema on left lower leg [de-identified] : diminished BS b/l [de-identified] : erythema and swelling left lower leg

## 2020-02-26 NOTE — ED PROVIDER NOTE - OBJECTIVE STATEMENT
Pt is a 89 yo male with pmhx of CAD, multiple vessel on asa Reflux Scoliosis recent rhinovirus on home o2 2 L former smoker sent in by Dr. Dawson for abnormal blood test and dark stools. As per aide pt was initially on iron so stool was dark but stopped one week ago but still having dark stool. Pt also noted to have redness to right lower leg. Pt denies any pain nvd fever chills chest pain or sob. Pt denies any falls

## 2020-02-26 NOTE — ED ADULT NURSE NOTE - NSIMPLEMENTINTERV_GEN_ALL_ED
Implemented All Fall with Harm Risk Interventions:  [place] to call system. Call bell, personal items and telephone within reach. Instruct patient to call for assistance. Room bathroom lighting operational. Non-slip footwear when patient is off stretcher. Physically safe environment: no spills, clutter or unnecessary equipment. Stretcher in lowest position, wheels locked, appropriate side rails in place. Provide visual cue, wrist band, yellow gown, etc. Monitor gait and stability. Monitor for mental status changes and reorient to person, place, and time. Review medications for side effects contributing to fall risk. Reinforce activity limits and safety measures with patient and family. Provide visual clues: red socks.

## 2020-02-26 NOTE — ED PROVIDER NOTE - CONSTITUTIONAL, MLM
normal... Well appearing, awake, alert, oriented to person, place, time/situation and in no apparent distress. pale skin on O2 2 L

## 2020-02-26 NOTE — HISTORY OF PRESENT ILLNESS
[FreeTextEntry8] : 89M presents for 1 week of worsening left lower leg swelling, rash. Denies fever, chills, pain but rash has been spreading up leg. Of note, CBC was done yesterday which shows Hgb 9.5 (down from 11). Patient reports black stool and has not been taking iron recently.

## 2020-02-26 NOTE — ED PROVIDER NOTE - SKIN, MLM
Skin normal color for race, warm, dry and intact. + redness to left lower leg with petechiae to foot and leg as well as right arm Skin normal color for race, warm, dry and intact. + redness to left lower leg with petechiae? abrasion to foot and leg as well as right arm normal warmth

## 2020-02-26 NOTE — ED ADULT TRIAGE NOTE - CHIEF COMPLAINT QUOTE
patient came in ED from MD's office with c/o bilateral leg swelling X 2 weeks, black stool X unknown duration and low Hgb.

## 2020-02-26 NOTE — ED PROVIDER NOTE - PATIENT PORTAL LINK FT
You can access the FollowMyHealth Patient Portal offered by [facility] by registering at the following website: [email or web address]. By joining [facility]’s FollowMyHealth portal, you will also be able to view your health information using other applications (apps) compatible with our system.

## 2020-02-27 PROBLEM — L98.429 ULCER OF SACRAL REGION, STAGE 1: Status: ACTIVE | Noted: 2020-01-01

## 2020-02-27 NOTE — REVIEW OF SYSTEMS
[Skin Rash] : skin rash [Chills] : no chills [Fever] : no fever [Night Sweats] : no night sweats [Palpitations] : no palpitations [Chest Pain] : no chest pain [Lower Ext Edema] : no lower extremity edema [Shortness Of Breath] : no shortness of breath [Cough] : no cough [Wheezing] : no wheezing [Dyspnea on Exertion] : not dyspnea on exertion [Abdominal Pain] : no abdominal pain [Nausea] : no nausea [Constipation] : no constipation [Diarrhea] : no diarrhea [Vomiting] : no vomiting [Dysuria] : no dysuria

## 2020-02-27 NOTE — HISTORY OF PRESENT ILLNESS
[FreeTextEntry8] : 89M presents for follow-up after being sent to ED for anemia. Hgb was stable and occult blood negative in the hospital. He was discharged with ceftin for left lower leg cellulitis.

## 2020-02-27 NOTE — ASSESSMENT
[FreeTextEntry1] : Cellulitis: Continue ceftin.\par Anemia: Labs from ED reviewed. Stable. \par Sacral ulcer: Recommended mepilex. Refer to wound care. \par

## 2020-02-27 NOTE — PHYSICAL EXAM
[Soft] : abdomen soft [No Acute Distress] : no acute distress [Non-distended] : non-distended [Non Tender] : non-tender [Normal Bowel Sounds] : normal bowel sounds [de-identified] : +2 LE edema with L>R [de-identified] : stage 1 sacral ulcer, intense erythema left lower leg

## 2020-03-03 NOTE — PHYSICAL EXAM
[4 x 4] : 4 x 4  [Normal Thyroid] : the thyroid was normal [Normal Breath Sounds] : Normal breath sounds [Normal Heart Sounds] : normal heart sounds [Normal Rate and Rhythm] : normal rate and rhythm [1+] : left 1+ [0] : left 0 [Ankle Swelling (On Exam)] : present [Ankle Swelling Bilaterally] : severe [Alert] : alert [Oriented to Person] : oriented to person [Oriented to Place] : oriented to place [Oriented to Time] : oriented to time [Calm] : calm [JVD] : no jugular venous distention  [] : not present [Abdomen Masses] : No abdominal massess [Abdomen Tenderness] : ~T ~M No abdominal tenderness [Tender] : nontender [Enlarged] : not enlarged [de-identified] : elderly WM, NAD, alert, Ox3, using nasal O2. WD, WN [de-identified] : LLE-area of erythema marked with skin pen [FreeTextEntry1] : Lower leg-  No Open Wounds- swelling and redness  [de-identified] : No treatment required  [de-identified] : skin intact; no ulcers; no erythema [FreeTextEntry7] : Sacrum  [de-identified] : Cleansed with Normal saline\par  [TWNoteComboBox1] : Left [de-identified] : Ace wraps [TWNoteComboBox9] : Midline

## 2020-03-03 NOTE — ASSESSMENT
[Verbal] : Verbal [Patient] : Patient [Family member] : Family member [Caregiver] : Caregiver [Fair - mild discomfort, physical impairment, low acceptance] : Fair - mild discomfort, physical impairment, low acceptance [Verbalizes knowledge/Understanding] : Verbalizes knowledge/understanding [Skin Care] : skin care [Signs and symptoms of infection] : sign and symptoms of infection [How and When to Call] : how and when to call [Off-loading] : off-loading [Compression Therapy] : compression therapy [Patient responsibility to plan of care] : patient responsibility to plan of care [Stable] : stable [Home] : Home [Wheelchair] : Wheelchair [Not Applicable - Long Term Care/Home Health Agency] : Long Term Care/Home Health Agency: Not Applicable [] : No [FreeTextEntry2] : Edema Control\par Oral Antibiotic Regimen\par Maintain Optimal Pain Level of 0/10\par F/U 1 week \par  [FreeTextEntry3] : Initial visit  [FreeTextEntry4] : Patient and family educated on edema control techniques and s/s of infection, all verbalized understanding.\par MD marked left leg for reddened area to monitor for infection.\gunjan CARMONA escribed Cefuroxime 500mg to be taken as prescribed, family is aware of need to  prescription.\par F/U 1 week

## 2020-03-03 NOTE — HISTORY OF PRESENT ILLNESS
[FreeTextEntry1] : 89 yo WM, here with his daughter and an aide as a new patient/evaluation for swelling in both LE and redness to his LLE x 1 wk. Pt recently tx for bronchitis with ceftin which he has been on the past week (pt supposedly has a PCN allergy) without any rxn. Pt was advised to come here for evaluation of his LLE cellulitis. No obvious wounds seen in either LE.\par \par \par \par \par Nurse's note-\par 89 year old male presents to the [facility] with left leg redness and swelling. The patient's daughter and aide reports that the patient was recently seen at [facility] for suspicion of cellulitis in the left leg. He was given Cerfuroxime 500mg 2 times daily and sent home. The patient is currently still in progress with his antibiotic regimen. He was recommended to follow up at the [facility] for care.

## 2020-03-05 NOTE — HISTORY OF PRESENT ILLNESS
[FreeTextEntry1] : The patient is a 89 year old male who was treated for cellulitis of his left lower leg with a two week course of Ceftin. The redness was still present after the first course, and he was placed on a second course of the same antibiotic. The redness did not resolve and he was brought back today for re-evaluation. It should be noted that the redness of the skin of his left lower leg has been present since October, 2019 and has been asymptomatic. The patient experiences no pain, no tenderness, no discomfort in the left lower leg. There have been no ulcers, no traumatic wounds , no fungal or other infections of his feet. The patient had an episode of bronchitis after the redness was found, and the redness has persisted. There is no history of fever or chills. The patient is eating and drinking normally. He is awake and alert.

## 2020-03-05 NOTE — PHYSICAL EXAM
[Normal Thyroid] : the thyroid was normal [Normal Rate and Rhythm] : normal rate and rhythm [1+] : left 1+ [0] : left 0 [Ankle Swelling (On Exam)] : present [Ankle Swelling Bilaterally] : severe [Alert] : alert [Oriented to Person] : oriented to person [Oriented to Place] : oriented to place [Oriented to Time] : oriented to time [Calm] : calm [JVD] : no jugular venous distention  [] : not present [Abdomen Masses] : No abdominal massess [Abdomen Tenderness] : ~T ~M No abdominal tenderness [Tender] : nontender [Enlarged] : not enlarged [Purpura] : no purpura  [Petechiae] : no petechiae [Skin Ulcer] : no ulcer [Skin Induration] : no induration [de-identified] : elderly WM, NAD, alert, Ox3, using nasal O2. WD, WN [de-identified] : Mild erythema of skin of left lower leg. Some edema of left lower leg and foot present. Edema of right lower leg also present. Some blotches of red seen left lower leg and right lower leg. No induration, no tenderness, no abnormal warmth to palpation left lower leg. Redness increase in dependent position and decreases with elevation. [FreeTextEntry1] : Left Lower Leg- no open wound [de-identified] : No Treatment [de-identified] : Cleansed with Normal Saline\par  [de-identified] : I spoke to the patient and to his caregiver at great length. I explained that it is extremely unusual for cellulitis to remain localized for five or six months. I also explained that cellulitis is a bacterial infection caused by skin ulcerations, wounds, and even fungal foot infections, none of which are present here. I further explained that cellulitis is often associated with local tenderness as well as systemic manifestations with fever and chills, none of which exist here.

## 2020-03-05 NOTE — PLAN
[FreeTextEntry1] : Will withhold antibiotics for now and patient to return in two days for re-evaluation off medications\par Patient to elevate his legs at home.

## 2020-03-05 NOTE — ASSESSMENT
[Verbal] : Verbal [Patient] : Patient [Good - alert, interested, motivated] : Good - alert, interested, motivated [Verbalizes knowledge/Understanding] : Verbalizes knowledge/understanding [Dressing changes] : dressing changes [Skin Care] : skin care [Pressure relief] : pressure relief [Signs and symptoms of infection] : sign and symptoms of infection [How and When to Call] : how and when to call [Off-loading] : off-loading [Patient responsibility to plan of care] : patient responsibility to plan of care [Stable] : stable [Home] : Home [Not Applicable - Long Term Care/Home Health Agency] : Long Term Care/Home Health Agency: Not Applicable [Walker] : Walker [] : No [FreeTextEntry2] : Maintain optimal skin integrity\par  [FreeTextEntry4] : F/U to [facility] on saturday 3/7/2020 \par MD made patient aware to hold antibiotics at this time. Pt stated understanding

## 2020-03-07 PROBLEM — L03.116 CELLULITIS OF LEFT LOWER EXTREMITY: Status: ACTIVE | Noted: 2019-01-01

## 2020-03-11 PROBLEM — I87.2 VENOUS STASIS DERMATITIS: Status: ACTIVE | Noted: 2020-01-01

## 2020-03-11 PROBLEM — N18.9 CKD (CHRONIC KIDNEY DISEASE): Status: ACTIVE | Noted: 2017-12-08

## 2020-03-11 PROBLEM — K59.00 CONSTIPATION: Status: ACTIVE | Noted: 2020-01-01

## 2020-03-11 NOTE — HISTORY OF PRESENT ILLNESS
[Formal Caregiver] : formal caregiver [FreeTextEntry1] : rash on leg [de-identified] : Pt has been to wound specialist. He was treated for cellulitis and now has chronic redness of his left lower leg. \par His stool is back to normal and not tarry since he stopped iron. He does take occasional colace now.

## 2020-03-11 NOTE — PHYSICAL EXAM
[Normal] : soft, non-tender, non-distended, no masses palpated, no HSM and normal bowel sounds [Normal Affect] : the affect was normal [Normal Insight/Judgement] : insight and judgment were intact [de-identified] : 2+ edema. venous stasis changes L > R. no open wounds. some dry flaky skin of left lower leg

## 2020-03-20 NOTE — PHYSICAL EXAM
[Normal Thyroid] : the thyroid was normal [Normal Breath Sounds] : Normal breath sounds [Normal Heart Sounds] : normal heart sounds [Normal Rate and Rhythm] : normal rate and rhythm [Ankle Swelling (On Exam)] : present [Ankle Swelling Bilaterally] : bilaterally  [Ankle Swelling On The Left] : moderate [Alert] : alert [Oriented to Person] : oriented to person [Oriented to Place] : oriented to place [Oriented to Time] : oriented to time [Calm] : calm [JVD] : no jugular venous distention  [] : not present [Abdomen Masses] : No abdominal massess [Abdomen Tenderness] : ~T ~M No abdominal tenderness [Tender] : nontender [Enlarged] : not enlarged [de-identified] : elderly WM, NAD, WD, Wn, alert, Ox3. [de-identified] : only mild erythema now. [FreeTextEntry1] : Left lower leg - no open wound - mild erythema [de-identified] : NSC, no dressing

## 2020-03-20 NOTE — ASSESSMENT
[Verbal] : Verbal [Handout] : Handout [Patient] : Patient [Good - alert, interested, motivated] : Good - alert, interested, motivated [Verbalizes knowledge/Understanding] : Verbalizes knowledge/understanding [Signs and symptoms of infection] : sign and symptoms of infection [] : Yes [Stable] : stable [Home] : Home [Wheelchair] : Wheelchair [Not Applicable - Long Term Care/Home Health Agency] : Long Term Care/Home Health Agency: Not Applicable [Skin Care] : skin care [How and When to Call] : how and when to call [Patient responsibility to plan of care] : patient responsibility to plan of care [FreeTextEntry2] : Maintain optimal skin integrity, infection prevention\par  [FreeTextEntry4] : Dr. Baires\par F/U 10 days 18

## 2020-03-20 NOTE — HISTORY OF PRESENT ILLNESS
[FreeTextEntry1] : 89 yo WM, here with his aide for f/u of his LLE that has had erythema covering most of his LLE.  The erythema looks only mild today and improved. Has  been on po ceftin for over a week and was discontinued on it 2 days ago since the erythema has been present since 10/19 and unlikely to be a cellulitis.

## 2020-05-18 PROBLEM — J84.9 INTERSTITIAL LUNG DISEASE: Status: ACTIVE | Noted: 2018-06-26

## 2020-05-18 PROBLEM — J44.9 COPD (CHRONIC OBSTRUCTIVE PULMONARY DISEASE): Status: ACTIVE | Noted: 2017-12-08

## 2020-05-18 PROBLEM — R09.02 HYPOXEMIA: Status: ACTIVE | Noted: 2018-07-09

## 2020-05-18 PROBLEM — I27.20 PULMONARY HTN: Status: ACTIVE | Noted: 2018-06-26

## 2020-06-02 NOTE — HISTORY OF PRESENT ILLNESS
[TextBox_4] : \par Verbal consent obtained by  from patient\par Telephone Visit\par \par Patient states home self isolated\par Has not had any travel outside of the home\par No known primary contact exposures\par Chronic hoarse voice\par Chronic dyspnea but no deterioration respiratory symptoms\par Also has his chronic hoarse voice\par Secretions are fairly stable without any purulence or hemoptysis\par No chest pain chest discomfort or pleuritic pain\par No significant reported lower extremity edema\par No fevers chills or sweats\par GI symptoms\par Appetite is stable\par Occasions updated and reviewed\par Understands hoarse voice likely due to the inhaled steroid\par Is been noted in the past which she is aware when off of the inhaled steroid he has more thick sputum\par \par Impression\par COPD\par Chronic hypoxemic respiratory failure\par \par Patient on long-term oxygen therapy with clinical benefit and improved well-being. Continue long-term oxygen care as ordered\par Ordered nebulizer treatments at his request\par COVID-19 precautions advised and discussed\par

## 2020-06-02 NOTE — REVIEW OF SYSTEMS
[Hypotension] : no hypotension [Chest Discomfort] : no chest discomfort [Dysphagia] : no dysphagia [PND] : no PND [Palpitations] : no palpitations [Nausea] : no nausea [Vomiting] : no vomiting [Constipation] : no constipation [Diarrhea] : no diarrhea [Abdominal Pain] : no abdominal pain [FreeTextEntry9] : CHF ASHD Diastolic dysfx HLD [de-identified] : Mild Pulmonary HTN

## 2020-06-02 NOTE — REVIEW OF SYSTEMS
[Hypotension] : no hypotension [Chest Discomfort] : no chest discomfort [Palpitations] : no palpitations [Dysphagia] : no dysphagia [PND] : no PND [Nausea] : no nausea [Vomiting] : no vomiting [Diarrhea] : no diarrhea [Constipation] : no constipation [Abdominal Pain] : no abdominal pain [FreeTextEntry9] : CHF ASHD Diastolic dysfx HLD [de-identified] : Mild Pulmonary HTN

## 2020-06-02 NOTE — DISCUSSION/SUMMARY
[FreeTextEntry1] : \par Mucus plugging mucoid impaction with difficulty clearing secretions- chronic\par COPD\par  ILD\par Hypoxemic respiratory failure\par Pulmonary HTN\par Lower extremity edema\par Borderline Low BP\par ASHD\par continue Albuterol with inhaled atrovent\par Patient on long-term oxygen therapy with clinical benefit and improved well-being. Continue long-term oxygen care as ordered\par Increased use of albuterol and Atrovent to 4 times daily to help clear secretions\par Restart budesonide inhaler twice daily\par Added p.o. prednisone 40 mg with every 3 10 mg taper- STOP since last visit\par Antibiotic Bactrim DS 1 tablet p.o. twice daily due to noted allergies avoid penicillins cephalosporins and doxycycline- completed\par Advised to go to [place] [facility] immediately if any deterioration ration of respiratory status\par For cough added Tessalon perles 100 mg tid prn\par f/u 4 weeks\par \par Duration of visit 22 minutes. \par

## 2020-06-02 NOTE — PROCEDURE
[FreeTextEntry1] : \par Glucose 120 February 20, 2020\par \par Spirometry without bronchodilator 12/12/2019\par  severe reduction in flow rates\par interval improvement at FVC\par \par End-tidal CO2 32\par \par PFT without bronchodilator July 26, 2019\par Moderate obstructive ventilatory impairment\par Air-trapping RV/TLC ratio 145% predicted\par Moderate to severe reduction diffusion 52% predicted with a loss of functioning alveolocapillary units\par Hemoglobin 10.4\par Overall pulmonary physiology is stable dating back to 2016\par \par Chest x-ray PA lateral February 12, 2020\par Cardiomegaly\par Elevated right hemidiaphragm with dilated colon\par Prominent interstitial markings without evidence of active congestive heart failure\par No pneumothorax\par Calcification aortic knob\par No consolidation infiltrate\par Compared to chest x-ray of November 4, 2019 no significant interval change\par \par Feb 12 2020 DATA Review\par WBC 13.62 hemoglobin 10.9 hematocrit 33.4 platelet count 205,000\par Procalcitonin just above normal range 0.14\par Review\par BUN 48 creatinine 1.54 noted prior renal function relatively unchanged\par Serum electrolytes normal\par Serum calcium 8.9\par RVP rapid positive for entero-rhinovirus

## 2020-06-02 NOTE — DISCUSSION/SUMMARY
[FreeTextEntry1] : \par Mucus plugging mucoid impaction with difficulty clearing secretions- chronic\par COPD\par  ILD\par Hypoxemic respiratory failure\par Pulmonary HTN\par Lower extremity edema\par Borderline Low BP\par ASHD\par continue Albuterol with inhaled atrovent\par Patient on long-term oxygen therapy with clinical benefit and improved well-being. Continue long-term oxygen care as ordered\par Increased use of albuterol and Atrovent to 4 times daily to help clear secretions\par Restart budesonide inhaler twice daily\par Added p.o. prednisone 40 mg with every 3 10 mg taper- STOP since last visit\par Antibiotic Bactrim DS 1 tablet p.o. twice daily due to noted allergies avoid penicillins cephalosporins and doxycycline- completed\par Advised to go to [place] [facility] immediately if any deterioration ration of respiratory status\par For cough added Tessalon perles 100 mg tid prn\par f/u 4 weeks\par \par Duration of visit 22 minutes. \par

## 2020-07-20 NOTE — HISTORY OF PRESENT ILLNESS
[FreeTextEntry1] : Matt Jeffery presented to the office today for a followup evaluation. He was last seen in the office in October.\par \par He is now 89 years old, with a history of medically managed severe coronary artery disease. He also has a history of scoliosis, spinal stenosis, lumbosacral radiculopathy and what is likely renal cell CA, followed conservatively. He has an unsteady gait and walks with a cane.  He has a history of hypoxia, likely from hypoventilation, and he is on oxygen. He has a tendency toward relative hypotension. which has been managed with liberalized salt intake.\par \par His daughter called last week, reporting worsened edema, and more severe dyspnea. She reduced his salt and water intake, and brought into the office for an evaluation.\par \par Since making those modifications, he has improved somewhat. His edema has improved. He is less short of breath with minimal activity.

## 2020-07-20 NOTE — DISCUSSION/SUMMARY
[FreeTextEntry1] : Certainly, there is a degree of decompensated heart failure. He probably has a right pleural effusion, and certainly has a degree of edema. Diuretics have been limited by hypotension in the past.\par \par He will continue to modify his salt and water intake. He will use compression stockings, with assistance.\par \par It is reasonable to obtain an echocardiogram to ensure that there has not been deterioration in ventricular function. He will schedule this.\par \par I will bring him back to the office in about 3 months.

## 2020-07-20 NOTE — REASON FOR VISIT
[Follow-Up - Clinic] : a clinic follow-up of [Dyspnea] : dyspnea [Coronary Artery Disease] : coronary artery disease [Hyperlipidemia] : hyperlipidemia

## 2020-07-20 NOTE — CARDIOLOGY SUMMARY
[LVEF ___%] : LVEF [unfilled]% [___] : [unfilled] [None] : normal LV function [Mild] : mild mitral regurgitation [Normal] : normal LA size

## 2020-07-20 NOTE — PHYSICAL EXAM
[General Appearance - Well Developed] : well developed [General Appearance - Well Nourished] : well nourished [Well Groomed] : well groomed [Normal Appearance] : normal appearance [General Appearance - In No Acute Distress] : no acute distress [No Deformities] : no deformities [Eyelids - No Xanthelasma] : the eyelids demonstrated no xanthelasmas [Normal Conjunctiva] : the conjunctiva exhibited no abnormalities [No Oral Pallor] : no oral pallor [No Oral Cyanosis] : no oral cyanosis [Normal Oral Mucosa] : normal oral mucosa [Normal Jugular Venous A Waves Present] : normal jugular venous A waves present [Normal Jugular Venous V Waves Present] : normal jugular venous V waves present [No Jugular Venous [place] A Waves] : no jugular venous [place] A waves [Respiration, Rhythm And Depth] : normal respiratory rhythm and effort [Auscultation Breath Sounds / Voice Sounds] : lungs were clear to auscultation bilaterally [Exaggerated Use Of Accessory Muscles For Inspiration] : no accessory muscle use [Abdomen Soft] : soft [Abdomen Tenderness] : non-tender [Abdomen Mass (___ Cm)] : no abdominal mass palpated [Nail Clubbing] : no clubbing of the fingernails [FreeTextEntry1] : wheelchair [Cyanosis, Localized] : no localized cyanosis [Petechial Hemorrhages (___cm)] : no petechial hemorrhages [Skin Color & Pigmentation] : normal skin color and pigmentation [] : no rash [No Venous Stasis] : no venous stasis [Skin Lesions] : no skin lesions [No Skin Ulcers] : no skin ulcer [No Xanthoma] : no  xanthoma was observed [Oriented To Time, Place, And Person] : oriented to person, place, and time [Affect] : the affect was normal [Mood] : the mood was normal [No Anxiety] : not feeling anxious [Normal] : normal [Normal Rate] : normal [Rhythm Regular] : regular [Normal S1] : normal S1 [Normal S2] : normal S2 [No Gallop] : no gallop heard [S3] : no S3 [S4] : no S4 [No Murmur] : no murmurs heard [2+] : left 2+ [Right Carotid Bruit] : no bruit heard over the right carotid [Right Femoral Bruit] : no bruit heard over the right femoral artery [Left Carotid Bruit] : no bruit heard over the left carotid [Left Femoral Bruit] : no bruit heard over the left femoral artery [1+] : right 1+ [Bruit] : no bruit heard [___ +] : bilateral [unfilled]U+ pretibial pitting edema [Rt] : varicose veins of the right leg noted [Lt] : varicose veins of the left leg noted

## 2020-07-27 PROBLEM — D64.9 ANEMIA: Status: ACTIVE | Noted: 2017-12-08

## 2020-08-11 PROBLEM — R00.2 PALPITATIONS: Status: ACTIVE | Noted: 2020-01-01

## 2020-08-15 NOTE — CONSULT NOTE ADULT - ASSESSMENT
89 y/o M PMH severe CAD medically managed, Diastolic HF, CKD, Hld, COPD,  c/o 1 month of increased palpitations, MCCARTY, bilat SAM and anemia    Severe CAD medically managed  - there is no evidence of acute ischemia.  - + trop x1 continue to trend  -no anginal complaints  -CW asa, statin, BB, ranexa     -there is no evidence of significant arrhythmia.  -ECG is relatively unchanged from prior  -SR on tele    HFpEF  - he is MCCARTY and has some lower extremity edema bilaterally this is multifactorial (HF, COPD, pulm htn)  in nature driven by anemia?  - TTE 7/12/20 mild to mod MR, mild AS, mod concentric LVH, severe pul htn, mod to severe TR Ef 55%  -No need to repeat TTE  -lasix 20 mg IV daily  -We have had difficulty in the past diuresising him as he becomes hypotensive     Anemia  - Would transfuse to keep Hb>8 and watch volume status and give lasix 40 mg IV after transfusion   -GI consult  -trend CBC  -per primary team    COPD  -pulm consult      -Pt is hemodynamically stable  -Monitor and replete lytes, keep K>4 and Mg >2  Thank you for the consult  Will continue to follow  Dino Akhtar [facility]  Cardiology   Spectra #[phone] 89 y/o M PMH severe CAD medically managed, Diastolic HF, CKD, Hld, COPD,  c/o 1 month of increased palpitations, MCCARTY, bilyadi VARGAS, found to be anemic    Severe CAD medically managed  - there is no evidence of acute ischemia.  - + trop x1 continue to trend  -no anginal complaints  -CW asa, statin, BB, ranexa     -there is no evidence of significant arrhythmia.  -ECG is relatively unchanged from prior  -SR on tele    HFpEF  - he is MCCARTY and has some lower extremity edema bilaterally this is multifactorial (HF, COPD, pulm htn)  in nature.  He has significant JVD as well  - TTE 7/12/20 mild to mod MR, mild AS, mod concentric LVH, severe pul htn, mod to severe TR Ef 55%  -No need to repeat TTE  -Change Lasix to 40 IV daily, and give an additional 40 IV with each unit of PRBC  -We have had difficulty in the past diuresising him as he becomes hypotensive .  Need to monitor BP closely  -Monitor I, O, K, creatinine very closely    Anemia  - Would transfuse to keep Hb>8 and watch volume status and give lasix 40 mg IV after transfusion   -GI consult  -trend CBC  -per primary team    COPD  -pulm consult      -Pt is hemodynamically stable  -Monitor and replete lytes, keep K>4 and Mg >2  Thank you for the consult  Will continue to follow  Dino Akhtar [facility]  Cardiology   Spectra #[phone]

## 2020-08-15 NOTE — H&P ADULT - PROBLEM SELECTOR PLAN 1
- pos stool occult, but having formed brown stools   - GI c/s by ED, apprec recs  - PPI IV  - anemia labs ordered

## 2020-08-15 NOTE — CONSULT NOTE ADULT - SUBJECTIVE AND OBJECTIVE BOX
[facility]  Az Bell PA-C  [address] [place] [id number]  [phone]      Chief Complaint:  Patient is a 89y old  Male who presents with a chief complaint of Anemia, sent by PMD (15 Aug 2020 13:53)      HPI: 89yo male with hx of COPD on 3L home O2, CAD, scoliosis, glaucoma sent by PMD due to low Hgb level (7.6). Patient had been experiencing decreased exercise tolerance, SOB, fatigue for about 1 month for unknown reason. He underwent work up by cardiologist (ekg, echo reported by daughter) and was told it was normal and did not explain his symptoms. He also followed with his pulmonologist who evaluated him (CXR was done per daughter, showed mild fluid in lining of lungs) but did not think his symptoms were due to his lungs or hx of COPD.      Allergies:  Doxycycline Hyclate (Unknown)  penicillin (Anaphylaxis)      Medications:  buDESOnide    Inhalation Suspension 0.5 milliGRAM(s) Inhalation two times a day  pantoprazole  Injectable 40 milliGRAM(s) IV Push daily  ranolazine 500 milliGRAM(s) Oral two times a day      PMHX/PSHX:  COPD with hypoxia  Scoliosis  CAD, multiple vessel  High blood pressure  Reflux  High cholesterol  H/O inguinal hernia repair      Family history:  Family history of early CAD      Social History:     ROS:     General:  No wt loss, fevers, chills, night sweats, + fatigue,   Eyes:  Good vision, no reported pain  ENT:  No sore throat, pain, runny nose, dysphagia  CV:  No pain, palpitations, hypo/hypertension  Resp:  No dyspnea, cough, tachypnea, wheezing  GI:  No pain, No nausea, No vomiting, No diarrhea, No constipation, No weight loss, No fever, No pruritis, No rectal bleeding, No tarry stools, No dysphagia,  :  No pain, bleeding, incontinence, nocturia  Muscle:  No pain, weakness  Neuro:  No weakness, tingling, memory problems  Psych:  No fatigue, insomnia, mood problems, depression  Endocrine:  No polyuria, polydipsia, cold/heat intolerance  Heme:  No petechiae, ecchymosis, easy bruisability  Skin:  No rash, tattoos, scars, edema      PHYSICAL EXAM:   Vital Signs:  Vital Signs Last 24 Hrs  T(C): 36.5 (15 Aug 2020 13:24), Max: 36.5 (15 Aug 2020 13:24)  T(F): 97.7 (15 Aug 2020 13:24), Max: 97.7 (15 Aug 2020 13:24)  HR: 72 (15 Aug 2020 13:24) (69 - 72)  BP: 111/58 (15 Aug 2020 13:24) (111/58 - 119/57)  BP(mean): --  RR: 16 (15 Aug 2020 13:24) (16 - 18)  SpO2: 100% (15 Aug 2020 13:24) (93% - 100%)  Daily Height in cm: 162.56 (15 Aug 2020 10:36)    Daily     GENERAL:  Appears stated age, well-groomed, well-nourished, no distress  HEENT:  NC/AT,  conjunctivae clear and pink, no thyromegaly, nodules, adenopathy, no JVD, sclera -anicteric  CHEST:  Full & symmetric excursion, no increased effort, breath sounds clear  HEART:  Regular rhythm, S1, S2, no murmur/rub/S3/S4, no abdominal bruit, no edema  ABDOMEN:  Soft, non-tender, non-distended, normoactive bowel sounds,  no masses ,no hepato-splenomegaly, no signs of chronic liver disease  EXTEREMITIES:  no cyanosis,clubbing or edema  SKIN:  No rash/erythema/ecchymoses/petechiae/wounds/abscess/warm/dry  NEURO:  Alert, oriented, no asterixis, no tremor, no encephalopathy    LABS:                        6.8    11.34 )-----------( 236      ( 15 Aug 2020 11:22 )             22.7     08-15    138  |  103  |  53<H>  ----------------------------<  119<H>  4.2   |  28  |  2.00<H>    Ca    8.4<L>      15 Aug 2020 11:17    TPro  6.9  /  Alb  2.9<L>  /  TBili  0.6  /  DBili  x   /  AST  21  /  ALT  20  /  AlkPhos  151<H>  08-15    LIVER FUNCTIONS - ( 15 Aug 2020 11:17 )  Alb: 2.9 g/dL / Pro: 6.9 g/dL / ALK PHOS: 151 U/L / ALT: 20 U/L / AST: 21 U/L / GGT: x           PT/INR - ( 15 Aug 2020 11:17 )   PT: 14.3 sec;   INR: 1.23 ratio         PTT - ( 15 Aug 2020 11:17 )  PTT:29.2 sec        Imaging:

## 2020-08-15 NOTE — H&P ADULT - NSHPREVIEWOFSYSTEMS_GEN_ALL_CORE
CONSTITUTIONAL: denies fever, chills, weakness. Reports FATIGUE  HEENT: denies blurred vision, sore throat. +Chronic hoarse voice  SKIN: denies new lesions, rash  CARDIOVASCULAR: denies chest pain, chest pressure, palpitations  RESPIRATORY: +Chronic shortness of breath, Denies cough, sputum production  GASTROINTESTINAL: denies nausea, vomiting, diarrhea, abdominal pain, melena, hematochezia  GENITOURINARY: denies dysuria, discharge  NEUROLOGICAL: denies numbness, headache, focal weakness  MUSCULOSKELETAL: denies new joint pain, muscle aches  HEMATOLOGIC: denies gross bleeding, bruising  LYMPHATICS: denies enlarged lymph nodes. +chronic lower extremity swelling x 10 yrs  PSYCHIATRIC: denies recent changes in anxiety, depression  ENDOCRINOLOGIC: denies sweating, cold or heat intolerance

## 2020-08-15 NOTE — H&P ADULT - PROBLEM SELECTOR PLAN 2
- Crt 2.0 on admission, baseline 1.5  - Given concern for fluid overload and pts symptoms, lasix given  - Monitor renal function closely

## 2020-08-15 NOTE — H&P ADULT - NSHPATTENDINGPLANDISCUSS_GEN_ALL_CORE
treatement plan discussed with cardio, patient and her daughter. explained admissions process and plan.

## 2020-08-15 NOTE — H&P ADULT - ASSESSMENT
89yo male  with hx of CAD, COPD sent by PMD due to anemia in setting of fatigue and Decreased exercise tolerance. Admitted for symptomatic anemia. Ddx includes congestive heart  failure, worsening COPD. 89yo male  with hx of CAD, COPD sent by PMD due to anemia in setting of fatigue and Decreased exercise tolerance. Admitted for Sob likely due to symptomatic anemia vs heart failure/fluid overload. Ddx includes congestive heart  failure, worsening COPD.

## 2020-08-15 NOTE — ED PROVIDER NOTE - CLINICAL SUMMARY MEDICAL DECISION MAKING FREE TEXT BOX
pt with rodriguez, palp, le edema, anemia - ekg/xr/labs/cards pt with rodriguez, palp, le edema, anemia - ekg/xr/labs/cards/xfuse/lasix

## 2020-08-15 NOTE — H&P ADULT - NSHPPHYSICALEXAM_GEN_ALL_CORE
GENERAL: patient appears well, no acute distress, appropriate  EYES: sclera clear, no exudates, PERRLA  ENMT: oropharynx clear without erythema, no exudates, moist mucous membranes  NECK: supple, soft, no thyromegaly noted  LUNGS:  clear to auscultation,  no rales, wheezing appreciated  HEART: S1/S2, regular rate and rhythm, no murmurs noted, +reports bilateral lower extremity edema- chronic, Pedal pulses plapable b/L  GASTROINTESTINAL: abdomen is soft, nontender, nondistended, normoactive bowel sounds, no palpable masses  INTEGUMENT: good skin turgor, warm, dry and intact  MUSCULOSKELETAL: no clubbing or cyanosis, no obvious deformity  NEUROLOGIC: awake, alert, oriented x3, strength no obvious sensory deficits  PSYCHIATRIC: mood is good, affect is congruent, linear and logical thought process  SKIN: blisters on LE GENERAL: patient appears well, no acute distress, appropriate  EYES: sclera clear, no exudates, PERRLA  ENMT: oropharynx clear without erythema, no exudates, moist mucous membranes  NECK: supple, soft, no thyromegaly noted  LUNGS:  clear to auscultation,  no rales, wheezing appreciated  HEART: S1/S2, regular rate and rhythm, no murmurs noted, +reports bilateral lower extremity edema- chronic, Pedal pulses plapable b/L  GASTROINTESTINAL: abdomen is soft, nontender, nondistended, normoactive bowel sounds, no palpable masses  INTEGUMENT: good skin turgor, warm, dry and intact  MUSCULOSKELETAL: no clubbing or cyanosis, no obvious deformity  NEUROLOGIC: awake, alert, oriented x3, strength no obvious sensory deficits  PSYCHIATRIC: mood is good, affect is congruent, linear and logical thought process  SKIN: blisters on LE  RECTAL: external hemorrhoids visualized

## 2020-08-15 NOTE — ED ADULT NURSE NOTE - NSIMPLEMENTINTERV_GEN_ALL_ED
Implemented All Fall with Harm Risk Interventions:  [employer] to call system. Call bell, personal items and telephone within reach. Instruct patient to call for assistance. Room bathroom lighting operational. Non-slip footwear when patient is off stretcher. Physically safe environment: no spills, clutter or unnecessary equipment. Stretcher in lowest position, wheels locked, appropriate side rails in place. Provide visual cue, wrist band, yellow gown, etc. Monitor gait and stability. Monitor for mental status changes and reorient to person, place, and time. Review medications for side effects contributing to fall risk. Reinforce activity limits and safety measures with patient and family. Provide visual clues: red socks.

## 2020-08-15 NOTE — CONSULT NOTE ADULT - SUBJECTIVE AND OBJECTIVE BOX
CHIEF COMPLAINT: Patient is a 89y old  Male who presents with a chief complaint of abnormal lab    HPI:  89 y/o M PMH severe CAD medically managed, Diastolic HF, CKD, Hld, COPD,  c/o 1 month of increased palpitations, MCCARTY, bilat SAM and anemia.      PAST MEDICAL & SURGICAL HISTORY:  Scoliosis  CAD, multiple vessel  Reflux  H/O inguinal hernia repair      SOCIAL HISTORY:  No tobacco, ethanol, or drug abuse.    FAMILY HISTORY:  Family history of early CAD    No family history of acute MI or sudden cardiac death.    MEDICATIONS  (STANDING):  furosemide   Injectable 20 milliGRAM(s) IV Push Once    MEDICATIONS  (PRN):      Allergies    Doxycycline Hyclate (Unknown)  penicillin (Anaphylaxis)    Intolerances        REVIEW OF SYSTEMS:    CONSTITUTIONAL: No weakness, fevers or chills  EYES/ENT: No visual changes;  No vertigo or throat pain   NECK: No pain or stiffness  RESPIRATORY: No cough, wheezing, hemoptysis; No shortness of breath  CARDIOVASCULAR: No chest pain or palpitations  GASTROINTESTINAL: No abdominal pain. No nausea, vomiting, or hematemesis; No diarrhea or constipation. No melena or hematochezia.  GENITOURINARY: No dysuria, frequency or hematuria  NEUROLOGICAL: No numbness or weakness  SKIN: No itching or rash  All other review of systems is negative unless indicated above    VITAL SIGNS:   Vital Signs Last 24 Hrs  T(C): 35.8 (15 Aug 2020 10:36), Max: 35.8 (15 Aug 2020 10:36)  T(F): 96.5 (15 Aug 2020 10:36), Max: 96.5 (15 Aug 2020 10:36)  HR: 69 (15 Aug 2020 11:05) (69 - 71)  BP: 119/57 (15 Aug 2020 11:05) (113/63 - 119/57)  BP(mean): --  RR: 18 (15 Aug 2020 11:05) (18 - 18)  SpO2: 100% (15 Aug 2020 11:05) (93% - 100%)    I&O's Summary      On Exam:    Constitutional: NAD, alert and oriented x 3  Lungs:  Non-labored, breath sounds are clear bilaterally, No wheezing, rales or rhonchi  Cardiovascular: RRR.  S1 and S2 positive.  No murmurs, rubs, gallops or clicks  Gastrointestinal: Bowel Sounds present, soft, nontender.   Lymph: No peripheral edema. No cervical lymphadenopathy.  Neurological: Alert, no focal deficits  Skin: No rashes or ulcers   Psych:  Mood & affect appropriate.    LABS: All Labs Reviewed:                        6.8    11.34 )-----------( 236      ( 15 Aug 2020 11:22 )             22.7     15 Aug 2020 11:17    138    |  103    |  53     ----------------------------<  119    4.2     |  28     |  2.00     Ca    8.4        15 Aug 2020 11:17    TPro  6.9    /  Alb  2.9    /  TBili  0.6    /  DBili  x      /  AST  21     /  ALT  20     /  AlkPhos  151    15 Aug 2020 11:17    PT/INR - ( 15 Aug 2020 11:17 )   PT: 14.3 sec;   INR: 1.23 ratio         PTT - ( 15 Aug 2020 11:17 )  PTT:29.2 sec  CARDIAC MARKERS ( 15 Aug 2020 11:17 )  .063 ng/mL / x     / x     / x     / x          Blood Culture:   08-15 @ 11:17  Pro Bnp [id number]        RADIOLOGY:    EK/20 SR RBBB lafb  TTE:  < from: TTE Echo Doppler w/o Cont (10.14.15 @ 14:52) >   EXAM:  ECHO TTE W/O CON COMP W/DOPPLR           PROCEDURE DATE:  10/14/2015      INTERPRETATION:  INDICATION: aortic valve disease    Blood Pressure        Height 160     Weight 71       BSA 1.7    Dimensions:    LA 2.6       Normal Values: 2.0 - 4.0 cm    Ao 3.5        Normal Values: 2.0 - 3.8 cm  SEPTUM 1.2       Normal Values: 0.6 - 1.2 cm  PWT 1.2       Normal Values: 0.6 - 1.1 cm  LVIDd 4.0         Normal Values: 3.0 - 5.6 cm  LVIDs 2.7         Normal Values: 1.8 - 4.0 cm    Derived Variables:  LVMI     g/m2  RWT      Fractional Short      Ejection Fraction 60    Doppler Peak v. AoV=   (m/sec)    OBSERVATIONS:    Mitral Valve: Thickened leaflets, 1+ MR.  Aortic Valve/Aorta: Calcified trileaflet aortic valve.  Tricuspid Valve: normal with 2+ TR.  Pulmonic Valve: 2+ DC  Left Atrium: normal  Right Atrium: normal  Left Ventricle: Mild concentric LVH with normal systolic function,   estimated LVEF of 60 %.  Right Ventricle: normal size and systolic function.  Pericardium/Pleura: normal, no significant pericardial effusion.  Pulmonary/RV Pressure: estimated PA systolic pressure of 43 mmHg assuming   an RA pressure of 10 mmHg.  LV Diastolic Function: Grade 1 diastolic dysfunction.    Mild concentric LVH with normal systolic function,estimated LVEF of 60   %. Normal right ventricular size and systolic function. Grade 1 diastolic   dysfunction. Normal biatrial size. The aortic root is normal in size. The   mitral valve leaflets are thickened, 1+ MR. The aortic valve is calcified   without stenosis or insufficiency. 2+  TR. Estimated PA systolic pressure   is 43mm Hg, assuming an RA pressure of 10 mmHg. 2+ DC.  No significant   pericardial effusion.                 ALEXIS MORALES M.D., ATTENDING CARDIOLOGIST  This document hasbeen electronically signed. Oct 15 2015  5:41P    < end of copied text > CHIEF COMPLAINT: Patient is a 89y old  Male who presents with a chief complaint of abnormal lab    HPI:  89 y/o M PMH severe CAD medically managed, Diastolic HF, CKD, Hld, COPD,  c/o 1 month of increased palpitations, MCCARTY, bilat SAM and anemia.  No complaints of chest pain,  N/V, HA reported. No signs of orthopnea or PND. + dyspnea, palpitations decreased exercise tolerance,    PAST MEDICAL & SURGICAL HISTORY:  Scoliosis  CAD, multiple vessel  Reflux  H/O inguinal hernia repair      SOCIAL HISTORY:  No tobacco, ethanol, or drug abuse.    FAMILY HISTORY:  Family history of early CAD    No family history of acute MI or sudden cardiac death.    MEDICATIONS  (STANDING):  furosemide   Injectable 20 milliGRAM(s) IV Push Once    MEDICATIONS  (PRN):      Allergies    Doxycycline Hyclate (Unknown)  penicillin (Anaphylaxis)    Intolerances        REVIEW OF SYSTEMS:    CONSTITUTIONAL: No weakness, fevers or chills  EYES/ENT: No visual changes;  No vertigo or throat pain   NECK: No pain or stiffness  RESPIRATORY: No cough, wheezing, hemoptysis; + shortness of breath  CARDIOVASCULAR: No chest pain + palpitations  GASTROINTESTINAL: No abdominal pain. No nausea, vomiting, or hematemesis; No diarrhea or constipation. No melena or hematochezia.  GENITOURINARY: No dysuria, frequency or hematuria  NEUROLOGICAL: No numbness or weakness  SKIN: No itching or rash  All other review of systems is negative unless indicated above    VITAL SIGNS:   Vital Signs Last 24 Hrs  T(C): 35.8 (15 Aug 2020 10:36), Max: 35.8 (15 Aug 2020 10:36)  T(F): 96.5 (15 Aug 2020 10:36), Max: 96.5 (15 Aug 2020 10:36)  HR: 69 (15 Aug 2020 11:05) (69 - 71)  BP: 119/57 (15 Aug 2020 11:05) (113/63 - 119/57)  BP(mean): --  RR: 18 (15 Aug 2020 11:05) (18 - 18)  SpO2: 100% (15 Aug 2020 11:05) (93% - 100%)    I&O's Summary      On Exam:  Tele: Sr  Constitutional: NAD, alert and oriented x 3  Lungs:  Non-labored, breath sounds are clear bilaterally, No wheezing, rales or rhonchi  Cardiovascular: RRR.  S1 and S2 positive.  + murmur No rubs, gallops or clicks   Gastrointestinal: Bowel Sounds present, soft, nontender.   Lymph: + Bilat LE peripheral edema.  . No cervical lymphadenopathy.  Neurological: Alert, no focal deficits  Skin: No rashes or ulcers   Psych:  Mood & affect appropriate.    LABS: All Labs Reviewed:                        6.8    11.34 )-----------( 236      ( 15 Aug 2020 11:22 )             22.7     15 Aug 2020 11:17    138    |  103    |  53     ----------------------------<  119    4.2     |  28     |  2.00     Ca    8.4        15 Aug 2020 11:17    TPro  6.9    /  Alb  2.9    /  TBili  0.6    /  DBili  x      /  AST  21     /  ALT  20     /  AlkPhos  151    15 Aug 2020 11:17    PT/INR - ( 15 Aug 2020 11:17 )   PT: 14.3 sec;   INR: 1.23 ratio         PTT - ( 15 Aug 2020 11:17 )  PTT:29.2 sec  CARDIAC MARKERS ( 15 Aug 2020 11:17 )  .063 ng/mL / x     / x     / x     / x          Blood Culture:   08-15 @ 11:17  Pro Bnp [id number]        RADIOLOGY:  < from: Xray Chest 1 View- PORTABLE-Routine (08.15.20 @ 11:35) >  EXAM:  XR CHEST PORTABLE ROUTINE 1V                            PROCEDURE DATE:  08/15/2020          INTERPRETATION:  CLINICAL INFORMATION: Palpitations, dyspnea on exertion.    A frontal view of the chest was obtained.    Comparison: 2/26/2020.    IMPRESSION:    The mediastinal cardiac silhouette is enlarged.    Elevated right hemidiaphragm unchanged. Small right effusion. Bibasilar atelectasis.    Air distended colon partially imaged.    No acute osseous finding.                  GARETT HERNANDEZ M.D., ATTENDING RADIOLOGIST  This document has been electronically signed. Aug 15 2020 11:47AM        < end of copied text >    EKG:   SR RBBB LAD  TTE:  < from: TTE Echo Doppler w/o Cont (10.14.15 @ 14:52) >   EXAM:  ECHO TTE W/O CON COMP W/DOPPLR           PROCEDURE DATE:  10/14/2015      INTERPRETATION:  INDICATION: aortic valve disease    Blood Pressure        Height 160     Weight 71       BSA 1.7    Dimensions:    LA 2.6       Normal Values: 2.0 - 4.0 cm    Ao 3.5        Normal Values: 2.0 - 3.8 cm  SEPTUM 1.2       Normal Values: 0.6 - 1.2 cm  PWT 1.2       Normal Values: 0.6 - 1.1 cm  LVIDd 4.0         Normal Values: 3.0 - 5.6 cm  LVIDs 2.7         Normal Values: 1.8 - 4.0 cm    Derived Variables:  LVMI     g/m2  RWT      Fractional Short      Ejection Fraction 60    Doppler Peak v. AoV=   (m/sec)    OBSERVATIONS:    Mitral Valve: Thickened leaflets, 1+ MR.  Aortic Valve/Aorta: Calcified trileaflet aortic valve.  Tricuspid Valve: normal with 2+ TR.  Pulmonic Valve: 2+ SC  Left Atrium: normal  Right Atrium: normal  Left Ventricle: Mild concentric LVH with normal systolic function,   estimated LVEF of 60 %.  Right Ventricle: normal size and systolic function.  Pericardium/Pleura: normal, no significant pericardial effusion.  Pulmonary/RV Pressure: estimated PA systolic pressure of 43 mmHg assuming   an RA pressure of 10 mmHg.  LV Diastolic Function: Grade 1 diastolic dysfunction.    Mild concentric LVH with normal systolic function,estimated LVEF of 60   %. Normal right ventricular size and systolic function. Grade 1 diastolic   dysfunction. Normal biatrial size. The aortic root is normal in size. The   mitral valve leaflets are thickened, 1+ MR. The aortic valve is calcified   without stenosis or insufficiency. 2+  TR. Estimated PA systolic pressure   is 43mm Hg, assuming an RA pressure of 10 mmHg. 2+ SC.  No significant   pericardial effusion.                 ALEXIS MORALES M.D., ATTENDING CARDIOLOGIST  This document hasbeen electronically signed. Oct 15 2015  5:41P    < end of copied text >

## 2020-08-15 NOTE — H&P ADULT - PROBLEM SELECTOR PLAN 3
- possible acute on chronic  - cardio consulted by ED  - trop trended down  - lasix 20mg IV in ED, lasix 40mg IV with pRBC as rec by cardio  - conitnue lasix as tolerated (monitor I&O, daily weights, BMP)

## 2020-08-15 NOTE — H&P ADULT - NSICDXPASTMEDICALHX_GEN_ALL_CORE_FT
PAST MEDICAL HISTORY:  CAD, multiple vessel     COPD with hypoxia On home oxygen, 3L    Reflux     Scoliosis

## 2020-08-15 NOTE — H&P ADULT - HISTORY OF PRESENT ILLNESS
89yo male with hx of COPD on 3L home O2, CAD, scoliosis, glaucoma sent by PMD due to low Hgb level (7.6). Patient had been experiencing decreased exercise tolerance, SOB, fatigue for about 1 month for unknown reason. He underwent work up by cardiologist (ekg, echo reported by daughter) and was told it was normal and did not explain his symptoms. He also followed with his pulmonologist who evaluated him (CXR was done per daughter, showed mild fluid in lining of lungs) but did not think his symptoms were due to his lungs or hx of COPD. 89yo male with hx of COPD on 3L home O2, CAD, scoliosis, glaucoma sent by PMD due to low Hgb level (7.6). Patient had been experiencing decreased exercise tolerance, SOB, fatigue for about 1 month for unknown reason. He underwent work up by cardiologist (ekg, echo reported by daughter) and was told it was normal and did not explain his symptoms. He also followed with his pulmonologist who evaluated him (CXR was done per daughter, showed mild fluid in lining of lungs) but did not think his symptoms were due to his lungs or hx of COPD.  Denies melena, hematochezia, diarrhea, abdominal pain, unintentional weight loss.

## 2020-08-15 NOTE — ED ADULT NURSE NOTE - OBJECTIVE STATEMENT
89 y/o male comes in A&Ox4 from home with complaints of weakness, palpitations and intermittent shortness of breath that has been worsening. As per daughter they have been going to different doctors to find out what is going on. Patient had blood work done and was found to have low H&H. No complaints of chest pain. Patient wears continuous oxygen at home. PIV started. EKG obtained and cardiac monitor in place. Plan of care discussed with patient. Comfort and safety maintained. Will continue to monitor.

## 2020-08-15 NOTE — ED ADULT NURSE NOTE - CHPI ED NUR SYMPTOMS NEG
no syncope/no congestion/no dizziness/no chest pain/no back pain/no vomiting/no chills/no diaphoresis/no fever

## 2020-08-15 NOTE — ED PROVIDER NOTE - OBJECTIVE STATEMENT
pt bib ems for 1 month of intermittent palp, dyspnea on exertion, le edema, and anemia on outpt labs (hgb 7.6). no fevers, chills, cp, cough, abd pain, travel. pt had seen his pmd, pulm and cardio for same, had echo and cxr, pulm told likely due to card, card told likely due to pulm, pmd told likely due to anemia.  pmd - jesse  cards - peter  pulm - [facility]

## 2020-08-15 NOTE — CONSULT NOTE ADULT - ATTENDING COMMENTS
I personally saw and examined the patient in detail.  I have spoken to the above provider regarding the assessment and plan of care.  I reviewed the above assessment and plan of care, and agree.  I have made changes in the body of the note where appropriate.  New onset acute blood loss anemia.  Transfuse PRBC.  Lasix with each unit.  Change PO Lasix to IV.

## 2020-08-15 NOTE — H&P ADULT - PROBLEM SELECTOR PLAN 4
- Cont home meds (budesonide bid, duonebs converted to albuterol tid)  - cont O2 at 3L NC (home med)

## 2020-08-16 NOTE — PROGRESS NOTE ADULT - SUBJECTIVE AND OBJECTIVE BOX
[facility] -- Rogers Morgan, Edmundo Ayoub, Kameron Doe Savella, Goodger  Office # [phone]    Follow Up:  CAd, CHF, Preop Optimization    Subjective/Observations:  c/o some lightheadedness but no dizziness.  Denies any respiratory or cardiac discomfort.  No evidence of bleeding overnight    REVIEW OF SYSTEMS: All other review of systems is negative unless indicated above  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, 3L  Scoliosis  CAD, multiple vessel  Reflux  H/O inguinal hernia repair    MEDICATIONS  (STANDING):  ALBUTerol    90 MICROgram(s) HFA Inhaler 2 Puff(s) Inhalation three times a day  atorvastatin 40 milliGRAM(s) Oral at bedtime  budesonide 160 MICROgram(s)/formoterol 4.5 MICROgram(s) Inhaler 2 Puff(s) Inhalation two times a day  furosemide   Injectable 40 milliGRAM(s) IV Push daily  latanoprost 0.005% Ophthalmic Solution 1 Drop(s) Both EYES at bedtime  metoprolol tartrate 12.5 milliGRAM(s) Oral two times a day  pantoprazole  Injectable 40 milliGRAM(s) IV Push daily  ranolazine 500 milliGRAM(s) Oral two times a day  simbrinza 1 Drop(s) 1 Drop(s) Right EYE two times a day  tiotropium 18 MICROgram(s) Capsule 1 Capsule(s) Inhalation daily    MEDICATIONS  (PRN):    Allergies    Doxycycline Hyclate (Unknown)  penicillin (Anaphylaxis)    Intolerances    Vital Signs Last 24 Hrs  T(C): 36.3 (16 Aug 2020 07:13), Max: 36.7 (15 Aug 2020 19:05)  T(F): 97.4 (16 Aug 2020 07:13), Max: 98 (15 Aug 2020 19:05)  HR: 99 (16 Aug 2020 07:13) (69 - 99)  BP: 112/75 (16 Aug 2020 07:13) (106/71 - 129/76)  BP(mean): --  RR: 16 (16 Aug 2020 07:13) (16 - 18)  SpO2: 98% (16 Aug 2020 07:13) (91% - 100%)  I&O's Summary    15 Aug 2020 07:01  -  16 Aug 2020 07:00  --------------------------------------------------------  IN: 0 mL / OUT: 500 mL / NET: -500 mL      Weight (kg): 70.3 (08-15 @ 10:36)    PHYSICAL EXAM:  TELE: STach 100's  Constitutional: NAD, awake and alert, well-developed  HEENT: Moist Mucous Membranes, Anicteric  Pulmonary: Non-labored, breath sounds are clear but diminished bilaterally, No wheezing, rales or rhonchi  Cardiovascular: Regular, S1 and S2, + murmurs, no rubs, gallops or clicks  Gastrointestinal: Bowel Sounds present, soft, nontender.   Lymph: No peripheral edema. No lymphadenopathy.  Skin: No visible rashes or ulcers.  Pale in color  Psych:  Mood & affect: flat with some confusion  LABS: All Labs Reviewed:                        8.6    13.11 )-----------( 293      ( 16 Aug 2020 07:35 )             27.6                         6.8    11.34 )-----------( 236      ( 15 Aug 2020 11:22 )             22.7     16 Aug 2020 07:35    137    |  101    |  52     ----------------------------<  206    4.2     |  26     |  2.10   15 Aug 2020 11:17    138    |  103    |  53     ----------------------------<  119    4.2     |  28     |  2.00     Ca    8.5        16 Aug 2020 07:35  Ca    8.4        15 Aug 2020 11:17    TPro  6.8    /  Alb  2.9    /  TBili  0.8    /  DBili  x      /  AST  43     /  ALT  33     /  AlkPhos  211    16 Aug 2020 07:35  TPro  6.9    /  Alb  2.9    /  TBili  0.6    /  DBili  x      /  AST  21     /  ALT  20     /  AlkPhos  151    15 Aug 2020 11:17    PT/INR - ( 15 Aug 2020 11:17 )   PT: 14.3 sec;   INR: 1.23 ratio      PTT - ( 15 Aug 2020 11:17 )  PTT:29.2 sec  CARDIAC MARKERS ( 15 Aug 2020 17:01 )  .038 ng/mL / x     / x     / x     / x      CARDIAC MARKERS ( 15 Aug 2020 11:17 )  .063 ng/mL / x     / x     / x     / x        < from: TTE Echo Doppler w/o Cont (10.14.15 @ 14:52) >  < end of copied text >    < from: Xray Chest 1 View- PORTABLE-Routine (08.15.20 @ 11:35) >    EXAM:  XR CHEST PORTABLE ROUTINE 1V                          PROCEDURE DATE:  08/15/2020      INTERPRETATION:  CLINICAL INFORMATION: Palpitations, dyspnea on exertion.    A frontal view of the chest was obtained.    Comparison: 2/26/2020.    IMPRESSION:    The mediastinal cardiac silhouette is enlarged.    Elevated right hemidiaphragm unchanged. Small right effusion. Bibasilar atelectasis.    Air distended colon partially imaged.    No acute osseous finding.    GARETT HERNANDEZ M.D., ATTENDING RADIOLOGIST  This document has been electronically signed. Aug 15 2020 11:47AM    < end of copied text >    < from: 12 Lead ECG (08.15.20 @ 10:58) >    Ventricular Rate 73 BPM    Atrial Rate 73 BPM    P-R Interval 214 ms    QRS Duration 130 ms    Q-T Interval 454 ms    QTC Calculation(Bezet) 500 ms    P Axis 31 degrees    R Axis -73 degrees    T Axis -21 degrees    Diagnosis Line Sinus rhythm [id number] degree AV block  Left axis deviation  Right bundle branch block  Inferior infarct (cited on or before 13-OCT-2015)  Anterolateral infarct , age undetermined  Confirmed by REMINGTON DOE ([id number]) on 8/15/2020 12:08:17 PM    < end of copied text >

## 2020-08-16 NOTE — PROGRESS NOTE ADULT - ASSESSMENT
89 y/o M PMH severe CAD medically managed, Diastolic HF, CKD, Hld, COPD,  c/o 1 month of increased palpitations, MCCARTY, bilat SAM, found to be anemic    Severe CAD medically managed  - There is no evidence of acute ischemia.  - + trop x1 but second trop negative.  No need to trend  - EKG showed NSR with inferior Qwaves but no acute ischemic changes.  + bifascicular block but no evidence of AV block or significant bradycardia on tele  - No anginal complaints  - Hold ASA in the setting of significant anemia on presentation, especially in the absence of GI w/u  - Continue statin, BB, and Ranexa    Anemia  - Presented with HGb of 6.8 requiring PRBC's  - FOBT+  - GI eval noted.  No endoscopic procedure planned at this time in the setting of his advanced age and multiple comorbidities  - Okay to hold ASA.  Can resume when cleared by GI  - If transfused, please give in split unit    HFpEF  - + MCCARTY with LE extremity edema bilaterally this is multifactorial (HF, COPD, pulm htn)  in nature.    -  Respiratory status improved but sill requiring NC  - TTE 7/12/20 mild to mod MR, mild AS, mod concentric LVH, severe pul htn, mod to severe TR Ef 55%  - No need to repeat TTE  - Continue Lasix to 40 IV daily, and give an additional 40 IV with each unit of PRBC  - We have had difficulty in the past diuresing him as he becomes hypotensive .  Need to monitor BP closely  - Monitor I, O, K, creatinine very closely.  Replete to keep K>4 and Mag>2    DVT ppx  - PAS for now    Will continue to follow    Zuir Lawrence DNP, NP-C  Cardiology   Spectra #[phone]

## 2020-08-16 NOTE — PROGRESS NOTE ADULT - SUBJECTIVE AND OBJECTIVE BOX
INTERVAL HPI/OVERNIGHT EVENTS:  pt seen and examined  c/o palps  denies n/v/abd pain  per rn no s/s active gib  sp 1u prbc yesterday    MEDICATIONS  (STANDING):  ALBUTerol    90 MICROgram(s) HFA Inhaler 2 Puff(s) Inhalation three times a day  atorvastatin 40 milliGRAM(s) Oral at bedtime  budesonide 160 MICROgram(s)/formoterol 4.5 MICROgram(s) Inhaler 2 Puff(s) Inhalation two times a day  furosemide   Injectable 40 milliGRAM(s) IV Push daily  latanoprost 0.005% Ophthalmic Solution 1 Drop(s) Both EYES at bedtime  metoprolol tartrate 12.5 milliGRAM(s) Oral two times a day  pantoprazole  Injectable 40 milliGRAM(s) IV Push daily  ranolazine 500 milliGRAM(s) Oral two times a day  simbrinza 1 Drop(s) 1 Drop(s) Right EYE two times a day  tiotropium 18 MICROgram(s) Capsule 1 Capsule(s) Inhalation daily    MEDICATIONS  (PRN):      Allergies    Doxycycline Hyclate (Unknown)  penicillin (Anaphylaxis)    Intolerances        Review of Systems:    General:  No wt loss, fevers, chills, night sweats, fatigue   Eyes:  Good vision, no reported pain  ENT:  No sore throat, pain, runny nose, dysphagia  CV:  palps  Resp:  No dyspnea, cough, tachypnea, wheezing  GI:  No pain, No nausea, No vomiting, No diarrhea, No constipation, No weight loss, No fever, No pruritis, No rectal bleeding, No melena, No dysphagia  :  No pain, bleeding, incontinence, nocturia  Muscle:  No pain, weakness  Neuro:  No weakness, tingling, memory problems  Psych:  No fatigue, insomnia, mood problems, depression  Endocrine:  No polyuria, polydypsia, cold/heat intolerance  Heme:  No petechiae, ecchymosis, easy bruisability  Skin:  No rash, tattoos, scars, edema      Vital Signs Last 24 Hrs  T(C): 36.3 (16 Aug 2020 07:13), Max: 36.7 (15 Aug 2020 19:05)  T(F): 97.4 (16 Aug 2020 07:13), Max: 98 (15 Aug 2020 19:05)  HR: 99 (16 Aug 2020 07:13) (69 - 99)  BP: 112/75 (16 Aug 2020 07:13) (106/71 - 129/76)  BP(mean): --  RR: 16 (16 Aug 2020 07:13) (16 - 18)  SpO2: 98% (16 Aug 2020 07:13) (91% - 100%)    PHYSICAL EXAM:    Constitutional: lying in bed frail  HEENT: ncat  Neck: No LAD  Gastrointestinal: soft nt mild dt  Extremities: No peripheral edema  Vascular: + peripheral pulses  Neurological: Awake alert      LABS:                        8.6    13.11 )-----------( 293      ( 16 Aug 2020 07:35 )             27.6     08-16    137  |  101  |  52<H>  ----------------------------<  206<H>  4.2   |  26  |  2.10<H>    Ca    8.5      16 Aug 2020 07:35    TPro  6.8  /  Alb  2.9<L>  /  TBili  0.8  /  DBili  x   /  AST  43<H>  /  ALT  33  /  AlkPhos  211<H>  08-16    PT/INR - ( 15 Aug 2020 11:17 )   PT: 14.3 sec;   INR: 1.23 ratio         PTT - ( 15 Aug 2020 11:17 )  PTT:29.2 sec      RADIOLOGY & ADDITIONAL TESTS:

## 2020-08-16 NOTE — PROGRESS NOTE ADULT - PROBLEM SELECTOR PLAN 1
- pos stool occult, but having formed brown stools   - GI () following, recs appreciated  -d/w patient and dtr at bedside, multiple significant medical co-morbidities including cad, chf, pulm htn and age would make endoscopic evaluation prohibitive  -transfuse as needed  - PPI IV  - f/u anemia labs, B12, Folate - pos stool occult, but having formed brown stools   - GI (Chanel) following, recs appreciated  -d/w patient and dtr at bedside, multiple significant medical co-morbidities including cad, chf, pulm htn and age would make endoscopic evaluation prohibitive  - Hgb improved. transfuse as needed  - PPI IV  - f/u anemia labs, B12, Folate

## 2020-08-16 NOTE — PROGRESS NOTE ADULT - PROBLEM SELECTOR PLAN 3
- possible acute on chronic  - trop trended down  - lasix 20mg IV in ED,   -c/w lasix 40 mg IV qd + additional lasix 40mg IV with pRBC as rec by cardio   -Pt has hx of becoming hypotensive with diuresis will monitor BP  -Monitor I&O, daily weights, BMP, K, cr closely. Replete to keep K>4 and Mag>2 - Cont home meds (budesonide bid, duonebs converted to albuterol tid)  - cont O2 at 3L NC (home med)

## 2020-08-16 NOTE — PROGRESS NOTE ADULT - PROBLEM SELECTOR PLAN 6
chronic PT with hx of chronic b/l le edema (10 years) p/w B/l lower extremity wounds likely 2/2 chronic venous stasis.  -Wound care consulted. f/u recs

## 2020-08-16 NOTE — PROGRESS NOTE ADULT - PROBLEM SELECTOR PLAN 4
- Cont home meds (budesonide bid, duonebs converted to albuterol tid)  - cont O2 at 3L NC (home med) - Crt 2.0 on admission, baseline 1.5  - Given concern for fluid overload and pts symptoms, lasix given  - Monitor renal function closely

## 2020-08-16 NOTE — PROGRESS NOTE ADULT - PROBLEM SELECTOR PLAN 2
- Crt 2.0 on admission, baseline 1.5  - Given concern for fluid overload and pts symptoms, lasix given  - Monitor renal function closely - possible acute on chronic  - trop trended down  - lasix 20mg IV in ED,   -c/w lasix 40 mg IV qd + additional lasix 40mg IV with pRBC as rec by cardio   -Pt has hx of becoming hypotensive with diuresis. will monitor BP  -Monitor I&O, daily weights, BMP, K, cr closely. Replete to keep K>4 and Mag>2

## 2020-08-16 NOTE — CONSULT NOTE ADULT - SUBJECTIVE AND OBJECTIVE BOX
PULMONARY/CRITICAL CARE      INTERVAL HPI/OVERNIGHT EVENTS:  Pt with anemia, COPD, c/o sob  89y MaleHPI:  89yo male with hx of COPD on 3L home O2, CAD, scoliosis, glaucoma sent by PMD due to low Hgb level (7.6). Patient had been experiencing decreased exercise tolerance, SOB, fatigue for about 1 month for unknown reason. He underwent work up by cardiologist (ekg, echo reported by daughter) and was told it was normal and did not explain his symptoms. He also followed with his pulmonologist who evaluated him (CXR was done per daughter, showed mild fluid in lining of lungs) but did not think his symptoms were due to his lungs or hx of COPD.  Denies melena, hematochezia, diarrhea, abdominal pain, unintentional weight loss. (15 Aug 2020 13:53)        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, 3L  Pulmonary Htn.  Scoliosis  CAD, multiple vessel  Reflux  H/O inguinal hernia repair        ICU Vital Signs Last 24 Hrs  T(C): 36.3 (16 Aug 2020 07:13), Max: 36.7 (15 Aug 2020 19:05)  T(F): 97.4 (16 Aug 2020 07:13), Max: 98 (15 Aug 2020 19:05)  HR: 99 (16 Aug 2020 07:13) (69 - 99)  BP: 112/75 (16 Aug 2020 07:13) (106/71 - 129/76)  BP(mean): --  ABP: --  ABP(mean): --  RR: 16 (16 Aug 2020 07:13) (16 - 18)  SpO2: 98% (16 Aug 2020 07:13) (91% - 100%)    Qtts:     I&O's Summary    15 Aug 2020 07:01  -  16 Aug 2020 07:00  --------------------------------------------------------  IN: 0 mL / OUT: 500 mL / NET: -500 mL            REVIEW OF SYSTEMS:    CONSTITUTIONAL: No fever, weight loss, or fatigue  EYES: No eye pain, visual disturbances, or discharge  ENMT:  No difficulty hearing, tinnitus, vertigo; No sinus or throat pain  NECK: No pain or stiffness  BREASTS: No pain, masses, or nipple discharge  RESPIRATORY: No cough, wheezing, chills or hemoptysis; has shortness of breath  CARDIOVASCULAR: No chest pain, palpitations, dizziness, or leg swelling  GASTROINTESTINAL: No abdominal or epigastric pain. No nausea, vomiting, or hematemesis; No diarrhea or constipation. No melena or hematochezia.  GENITOURINARY: No dysuria, frequency, hematuria, or incontinence  NEUROLOGICAL: No headaches, memory loss, loss of strength, numbness, or tremors  SKIN: No itching, burning, rashes, or lesions   LYMPH NODES: No enlarged glands  ENDOCRINE: No heat or cold intolerance; No hair loss  MUSCULOSKELETAL: No joint pain or swelling; No muscle, back, or extremity pain, no calf tenderness  PSYCHIATRIC: No depression, anxiety, mood swings, or difficulty sleeping  HEME/LYMPH: No easy bruising, or bleeding gums  ALLERGY AND IMMUNOLOGIC: No hives or eczema      PHYSICAL EXAM:    GENERAL: elderly male mod sob sitting up in bed  HEAD:  Atraumatic, Normocephalic  EYES: EOMI, PERRLA, conjunctiva and sclera clear  ENMT: No tonsillar erythema, exudates, or enlargement; Moist mucous membranes, Good dentition, No lesions  NECK: Supple, No JVD, Normal thyroid  NERVOUS SYSTEM:  Alert  Motor Strength 5/5 B/L upper and lower extremities  CHEST/LUNG: decreased bs, few bas  rales, good excursion , no  rhonchi, wheezing, or rubs  HEART: Regular rate and rhythm; No murmurs, rubs, or gallops  ABDOMEN: Soft, Nontender, Nondistended; Bowel sounds present  EXTREMITIES:  2+ Peripheral Pulses, No clubbing, cyanosis, 2 plus pedal edema  LYMPH: No lymphadenopathy noted  SKIN: No rashes or lesions        LABS:                        8.6    13.11 )-----------( 293      ( 16 Aug 2020 07:35 )             27.6     08-16    137  |  101  |  52<H>  ----------------------------<  206<H>  4.2   |  26  |  2.10<H>    Ca    8.5      16 Aug 2020 07:35    TPro  6.8  /  Alb  2.9<L>  /  TBili  0.8  /  DBili  x   /  AST  43<H>  /  ALT  33  /  AlkPhos  211<H>  08-16    PT/INR - ( 15 Aug 2020 11:17 )   PT: 14.3 sec;   INR: 1.23 ratio         PTT - ( 15 Aug 2020 11:17 )  PTT:29.2 sec      vanco through     RADIOLOGY & ADDITIONAL STUDIES:    < from: Xray Chest 1 View- PORTABLE-Routine (08.15.20 @ 11:35) >  EXAM:  XR CHEST PORTABLE ROUTINE 1V                            PROCEDURE DATE:  08/15/2020          INTERPRETATION:  CLINICAL INFORMATION: Palpitations, dyspnea on exertion.    A frontal view of the chest was obtained.    Comparison: 2/26/2020.    IMPRESSION:    The mediastinal cardiac silhouette is enlarged.    Elevated right hemidiaphragm unchanged. Small right effusion. Bibasilar atelectasis.    Air distended colon partially imaged.    No acute osseous finding.    < end of copied text >

## 2020-08-16 NOTE — PROGRESS NOTE ADULT - PROBLEM SELECTOR PLAN 9
IMPROVE VTE Individual Risk Assessment    RISK                                                                Points    [  ] Previous VTE                                                  3  [  ] Thrombophilia                                               2  [  ] Lower limb paralysis                                      2        (unable to hold up >15 seconds)    [  ] Current Cancer                                              2         (within 6 months)  [  ] Immobilization > 24 hrs                                1  [  ] ICU/CCU stay > 24 hours                              1  [ X ] Age > 60                                                      1  IMPROVE VTE Score _________  IMPROVE Score 0-1: Low Risk, No VTE prophylaxis required for most patients, encourage ambulation.   IMPROVE Score 2-3: At risk, pharmacologic VTE prophylaxis is indicated for most patients (in the absence of a contraindication)  IMPROVE Score > or = 4: High Risk, pharmacologic VTE prophylaxis is indicated for most patients (in the absence of a contraindication)

## 2020-08-16 NOTE — PROGRESS NOTE ADULT - PROBLEM SELECTOR PLAN 1
hgb improved sp transfusion  no melena or hematochezia  monitor cbc, transfuse as needed  cont ppi daily  diet as tolerated  multiple co morbidities and advanced age make endoscopic evaluation prohibitive; daughter agrees

## 2020-08-16 NOTE — CONSULT NOTE ADULT - SUBJECTIVE AND OBJECTIVE BOX
Consult received and appreciated. Chart reviewed. Full consult to be completed tomorrow. Discussed with Dr. Galdamez.

## 2020-08-16 NOTE — PROGRESS NOTE ADULT - ASSESSMENT
89yo male  with hx of CAD, COPD sent by PMD due to anemia in setting of fatigue and Decreased exercise tolerance. Admitted for Sob likely due to symptomatic anemia vs heart failure/fluid overload. Ddx includes congestive heart  failure, worsening COPD.

## 2020-08-16 NOTE — CONSULT NOTE ADULT - PROBLEM SELECTOR RECOMMENDATION 9
patient with drop in hgb  occult pos brown stool  no melena or hematochezia  transfuse as needed  d/w patient and dtr at bedside, multiple significant medical co-morbidities including cad, chf, pulm htn and age would make endoscopic evaluation prohibitive  daughter agrees  proton pump inhibitor daily  regular diet
Symbicort  Spiriva

## 2020-08-16 NOTE — PROGRESS NOTE ADULT - SUBJECTIVE AND OBJECTIVE BOX
Patient is a 89y old  Male who presents with a chief complaint of Anemia, sent by PMD (16 Aug 2020 10:22)      INTERVAL HPI/OVERNIGHT EVENTS: Patient seen and examined at bedside. No overnight events occurred. Pt was c/o heat intolerance and diaphoresis 2/2 to room air conditions. Admits chronic shortness of breath. Denies fevers, chills, headache, lightheadedness, chest pain, abdominal pain, n/v/d/c.    MEDICATIONS  (STANDING):  ALBUTerol    90 MICROgram(s) HFA Inhaler 2 Puff(s) Inhalation three times a day  atorvastatin 40 milliGRAM(s) Oral at bedtime  budesonide 160 MICROgram(s)/formoterol 4.5 MICROgram(s) Inhaler 2 Puff(s) Inhalation two times a day  furosemide   Injectable 40 milliGRAM(s) IV Push daily  latanoprost 0.005% Ophthalmic Solution 1 Drop(s) Both EYES at bedtime  metoprolol tartrate 12.5 milliGRAM(s) Oral two times a day  pantoprazole  Injectable 40 milliGRAM(s) IV Push daily  ranolazine 500 milliGRAM(s) Oral two times a day  simbrinza 1 Drop(s) 1 Drop(s) Right EYE two times a day  tiotropium 18 MICROgram(s) Capsule 1 Capsule(s) Inhalation daily    MEDICATIONS  (PRN):      Allergies    Doxycycline Hyclate (Unknown)  penicillin (Anaphylaxis)    Intolerances        REVIEW OF SYSTEMS:  CONSTITUTIONAL: Reports fatigue. diaphoresis. denies fever, chills, weakness.  HEENT: denies blurred vision, sore throat. +Chronic hoarse voice  SKIN: denies new lesions, rash  CARDIOVASCULAR: denies chest pain, chest pressure, palpitations  RESPIRATORY: +Chronic shortness of breath, Denies cough, sputum production  GASTROINTESTINAL: denies nausea, vomiting, diarrhea, abdominal pain, melena, hematochezia  GENITOURINARY: denies dysuria, discharge  NEUROLOGICAL: denies numbness, headache, focal weakness  MUSCULOSKELETAL: denies new joint pain, muscle aches  HEMATOLOGIC: denies gross bleeding, bruising  LYMPHATICS: denies enlarged lymph nodes. +chronic lower extremity swelling x 10 yrs  PSYCHIATRIC: denies recent changes in anxiety, depression  ENDOCRINOLOGIC: admits sweating, heat intolerance 2/2 room temp    Vital Signs Last 24 Hrs  T(C): 36.3 (16 Aug 2020 07:13), Max: 36.7 (15 Aug 2020 19:05)  T(F): 97.4 (16 Aug 2020 07:13), Max: 98 (15 Aug 2020 19:05)  HR: 99 (16 Aug 2020 07:13) (72 - 99)  BP: 112/75 (16 Aug 2020 07:13) (106/71 - 129/76)  BP(mean): --  RR: 16 (16 Aug 2020 07:13) (16 - 16)  SpO2: 98% (16 Aug 2020 07:13) (91% - 100%)    PHYSICAL EXAM:  GENERAL: elderly, mild distress, appropriate  EYES: sclera clear, no exudates, PERRL  LUNGS:  increased labor of breathing. accessory muscles in use. clear to auscultation,  no rales, wheezing appreciated  HEART: S1/S2, regular rate and rhythm, no murmurs noted, +bilateral lower extremity edema- chronic, Pedal pulses plapable b/L  GASTROINTESTINAL: abdomen is soft, nontender, nondistended, normoactive bowel sounds, no palpable masses  INTEGUMENT: good skin turgor, warm, dry and intact  MUSCULOSKELETAL: no clubbing or cyanosis, no obvious deformity  NEUROLOGIC: awake, alert, oriented x3, no obvious sensory deficits  SKIN: blisters on LE    LABS:                        8.6    13.11 )-----------( 293      ( 16 Aug 2020 07:35 )             27.6     CBC Full  -  ( 16 Aug 2020 07:35 )  WBC Count : 13.11 K/uL  Hemoglobin : 8.6 g/dL  Hematocrit : 27.6 %  Platelet Count - Automated : 293 K/uL  Mean Cell Volume : 83.1 fl  Mean Cell Hemoglobin : 25.9 pg  Mean Cell Hemoglobin Concentration : 31.2 gm/dL  Auto Neutrophil # : x  Auto Lymphocyte # : x  Auto Monocyte # : x  Auto Eosinophil # : x  Auto Basophil # : x  Auto Neutrophil % : x  Auto Lymphocyte % : x  Auto Monocyte % : x  Auto Eosinophil % : x  Auto Basophil % : x    16 Aug 2020 07:35    137    |  101    |  52     ----------------------------<  206    4.2     |  26     |  2.10     Ca    8.5        16 Aug 2020 07:35    TPro  6.8    /  Alb  2.9    /  TBili  0.8    /  DBili  x      /  AST  43     /  ALT  33     /  AlkPhos  211    16 Aug 2020 07:35    PT/INR - ( 15 Aug 2020 11:17 )   PT: 14.3 sec;   INR: 1.23 ratio         PTT - ( 15 Aug 2020 11:17 )  PTT:29.2 sec    CAPILLARY BLOOD GLUCOSE              RADIOLOGY & ADDITIONAL TESTS:    Personally reviewed.     Consultant(s) Notes Reviewed:  [x] YES  [ ] NO Patient is a 89y old  Male who presents with a chief complaint of Anemia, sent by PMD (16 Aug 2020 10:22)      INTERVAL HPI/OVERNIGHT EVENTS: Patient seen and examined at bedside. No overnight events occurred. Pt was c/o heat intolerance and diaphoresis 2/2 to room air conditions. Admits chronic shortness of breath. Denies fevers, chills, headache, lightheadedness, chest pain, abdominal pain, n/v/d/c.    MEDICATIONS  (STANDING):  ALBUTerol    90 MICROgram(s) HFA Inhaler 2 Puff(s) Inhalation three times a day  atorvastatin 40 milliGRAM(s) Oral at bedtime  budesonide 160 MICROgram(s)/formoterol 4.5 MICROgram(s) Inhaler 2 Puff(s) Inhalation two times a day  furosemide   Injectable 40 milliGRAM(s) IV Push daily  latanoprost 0.005% Ophthalmic Solution 1 Drop(s) Both EYES at bedtime  metoprolol tartrate 12.5 milliGRAM(s) Oral two times a day  pantoprazole  Injectable 40 milliGRAM(s) IV Push daily  ranolazine 500 milliGRAM(s) Oral two times a day  simbrinza 1 Drop(s) 1 Drop(s) Right EYE two times a day  tiotropium 18 MICROgram(s) Capsule 1 Capsule(s) Inhalation daily    MEDICATIONS  (PRN):      Allergies    Doxycycline Hyclate (Unknown)  penicillin (Anaphylaxis)    Intolerances        REVIEW OF SYSTEMS:  CONSTITUTIONAL: Reports fatigue. diaphoresis. denies fever, chills, weakness.  HEENT: denies blurred vision, sore throat. +Chronic hoarse voice  SKIN: denies new lesions, rash  CARDIOVASCULAR: denies chest pain, chest pressure, palpitations  RESPIRATORY: +Chronic shortness of breath, Denies cough, sputum production  GASTROINTESTINAL: denies nausea, vomiting, diarrhea, abdominal pain, melena, hematochezia  GENITOURINARY: denies dysuria, discharge  NEUROLOGICAL: denies numbness, headache, focal weakness  MUSCULOSKELETAL: denies new joint pain, muscle aches  HEMATOLOGIC: denies gross bleeding, bruising  LYMPHATICS: denies enlarged lymph nodes. +chronic lower extremity swelling x 10 yrs  PSYCHIATRIC: denies recent changes in anxiety, depression  ENDOCRINOLOGIC: admits sweating, heat intolerance 2/2 room temp    Vital Signs Last 24 Hrs  T(C): 36.3 (16 Aug 2020 07:13), Max: 36.7 (15 Aug 2020 19:05)  T(F): 97.4 (16 Aug 2020 07:13), Max: 98 (15 Aug 2020 19:05)  HR: 99 (16 Aug 2020 07:13) (72 - 99)  BP: 112/75 (16 Aug 2020 07:13) (106/71 - 129/76)  BP(mean): --  RR: 16 (16 Aug 2020 07:13) (16 - 16)  SpO2: 98% (16 Aug 2020 07:13) (91% - 100%)    PHYSICAL EXAM:  GENERAL: elderly, mild distress, appropriate  EYES: sclera clear, no exudates, PERRL  LUNGS:  increased labor of breathing. accessory muscles in use. clear to auscultation,  no rales, wheezing appreciated  HEART: S1/S2, regular rate and rhythm, no murmurs noted, +bilateral lower extremity edema- chronic, Pedal pulses plapable b/L  GASTROINTESTINAL: abdomen is soft, nontender, nondistended, normoactive bowel sounds, no palpable masses  INTEGUMENT: good skin turgor, warm, dry and intact  MUSCULOSKELETAL: no clubbing or cyanosis, no obvious deformity  NEUROLOGIC: awake, alert, oriented x3, no obvious sensory deficits  SKIN: blisters on b/l LE    LABS:                        8.6    13.11 )-----------( 293      ( 16 Aug 2020 07:35 )             27.6     CBC Full  -  ( 16 Aug 2020 07:35 )  WBC Count : 13.11 K/uL  Hemoglobin : 8.6 g/dL  Hematocrit : 27.6 %  Platelet Count - Automated : 293 K/uL  Mean Cell Volume : 83.1 fl  Mean Cell Hemoglobin : 25.9 pg  Mean Cell Hemoglobin Concentration : 31.2 gm/dL  Auto Neutrophil # : x  Auto Lymphocyte # : x  Auto Monocyte # : x  Auto Eosinophil # : x  Auto Basophil # : x  Auto Neutrophil % : x  Auto Lymphocyte % : x  Auto Monocyte % : x  Auto Eosinophil % : x  Auto Basophil % : x    16 Aug 2020 07:35    137    |  101    |  52     ----------------------------<  206    4.2     |  26     |  2.10     Ca    8.5        16 Aug 2020 07:35    TPro  6.8    /  Alb  2.9    /  TBili  0.8    /  DBili  x      /  AST  43     /  ALT  33     /  AlkPhos  211    16 Aug 2020 07:35    PT/INR - ( 15 Aug 2020 11:17 )   PT: 14.3 sec;   INR: 1.23 ratio         PTT - ( 15 Aug 2020 11:17 )  PTT:29.2 sec    CAPILLARY BLOOD GLUCOSE              RADIOLOGY & ADDITIONAL TESTS:    Personally reviewed.     Consultant(s) Notes Reviewed:  [x] YES  [ ] NO

## 2020-08-17 NOTE — DISCHARGE NOTE PROVIDER - NSDCMRMEDTOKEN_GEN_ALL_CORE_FT
aspirin 81 mg oral tablet: 1 tab(s) orally once a day  atorvastatin 40 mg oral tablet: 1 tab(s) orally once a day  budesonide 0.5 mg/2 mL inhalation suspension: 2 milliliter(s) inhaled 2 times a day  ipratropium-albuterol 0.5 mg-2.5 mg/3 mLinhalation solution: 3 milliliter(s) inhaled 4 times a day, As Needed  latanoprost 0.005% ophthalmic solution: 1 drop(s) in the left eye once a day (in the evening)  metoprolol tartrate 25 mg oral tablet: 0.5 tab(s) orally 2 times a day  Prevacid OTC 15 mg oral delayed release capsule: 1 cap(s) orally once a day  Ranexa 500 mg oral tablet, extended release: 1 tab(s) orally 2 times a day  Simbrinza 1%- 0.2% ophthalmic suspension: 1 drop(s) in the right eye 2 times a day  Vitamin D3: 1 tab(s) orally once a day aspirin 81 mg oral tablet: 1 tab(s) orally once a day  budesonide 0.5 mg/2 mL inhalation suspension: 2 milliliter(s) inhaled 2 times a day  ipratropium-albuterol 0.5 mg-2.5 mg/3 mLinhalation solution: 3 milliliter(s) inhaled 4 times a day, As Needed  latanoprost 0.005% ophthalmic solution: 1 drop(s) in the left eye once a day (in the evening)  metoprolol tartrate 25 mg oral tablet: 0.5 tab(s) orally 2 times a day  Prevacid OTC 15 mg oral delayed release capsule: 1 cap(s) orally once a day  Ranexa 500 mg oral tablet, extended release: 1 tab(s) orally 2 times a day  Simbrinza 1%- 0.2% ophthalmic suspension: 1 drop(s) in the right eye 2 times a day  Vitamin D3: 1 tab(s) orally once a day budesonide 0.5 mg/2 mL inhalation suspension: 2 milliliter(s) inhaled 2 times a day  fentaNYL 12 mcg/hr transdermal film, extended release: 1 patch transdermal every 72 hours  furosemide 40 mg oral tablet: 1 tab(s) orally once a day  ipratropium-albuterol 0.5 mg-2.5 mg/3 mLinhalation solution: 3 milliliter(s) inhaled 4 times a day, As Needed  latanoprost 0.005% ophthalmic solution: 1 drop(s) in the left eye once a day (in the evening)  metoprolol tartrate 25 mg oral tablet: 0.5 tab(s) orally 2 times a day  Prevacid OTC 15 mg oral delayed release capsule: 1 cap(s) orally once a day  Ranexa 500 mg oral tablet, extended release: 1 tab(s) orally 2 times a day  Simbrinza 1%- 0.2% ophthalmic suspension: 1 drop(s) in the right eye 2 times a day budesonide 0.5 mg/2 mL inhalation suspension: 2 milliliter(s) inhaled 2 times a day  fentaNYL 12 mcg/hr transdermal film, extended release: 1 patch transdermal every 72 hours  furosemide 40 mg oral tablet: 1 tab(s) orally once a day  ipratropium-albuterol 0.5 mg-2.5 mg/3 mLinhalation solution: 3 milliliter(s) inhaled 4 times a day, As Needed wheezing  latanoprost 0.005% ophthalmic solution: 1 drop(s) in the left eye once a day (in the evening)  metoprolol tartrate 25 mg oral tablet: 0.5 tab(s) orally 2 times a day  pantoprazole 40 mg oral delayed release tablet: 1 tab(s) orally once a day   Prevacid OTC 15 mg oral delayed release capsule: 1 cap(s) orally once a day  Ranexa 500 mg oral tablet, extended release: 1 tab(s) orally 2 times a day  Simbrinza 1%- 0.2% ophthalmic suspension: 1 drop(s) in the right eye 2 times a day

## 2020-08-17 NOTE — PROGRESS NOTE ADULT - PROBLEM SELECTOR PLAN 9
IMPROVE VTE Individual Risk Assessment    RISK                                                                Points    [  ] Previous VTE                                                  3  [  ] Thrombophilia                                               2  [  ] Lower limb paralysis                                      2        (unable to hold up >15 seconds)    [  ] Current Cancer                                              2         (within 6 months)  [  ] Immobilization > 24 hrs                                1  [  ] ICU/CCU stay > 24 hours                              1  [ X ] Age > 60                                                      1  IMPROVE VTE Score _________  IMPROVE Score 0-1: Low Risk, No VTE prophylaxis required for most patients, encourage ambulation.   IMPROVE Score 2-3: At risk, pharmacologic VTE prophylaxis is indicated for most patients (in the absence of a contraindication)  IMPROVE Score > or = 4: High Risk, pharmacologic VTE prophylaxis is indicated for most patients (in the absence of a contraindication) chronic

## 2020-08-17 NOTE — PROGRESS NOTE ADULT - PROBLEM SELECTOR PLAN 7
- holding home PPi, now on IV PPI PT with hx of chronic b/l le edema (10 years) p/w B/l lower extremity wounds likely 2/2 chronic venous stasis.  -Wound care consulted for new wound on LLE. f/u recs

## 2020-08-17 NOTE — PROGRESS NOTE ADULT - PROBLEM SELECTOR PLAN 3
- Cont home meds (budesonide bid, duonebs converted to albuterol tid)  - cont O2 at 3L NC (home med) - possible acute on chronic  - trop trended down  - lasix 20mg IV in ED,   -Pt has hx of becoming hypotensive with diuresis. will monitor BP  -Monitor I&O, daily weights, BMP, K, cr closely. Replete to keep K>4 and Mag>2  -IV Lasix swithed to po 40mg daily

## 2020-08-17 NOTE — DIETITIAN INITIAL EVALUATION ADULT. - ADD RECOMMEND
1. spoke with MD to activate diet more liberalized mechanical soft low Na ensure enlive BID 2. recommend add MVI 3. follow renal indices

## 2020-08-17 NOTE — PROGRESS NOTE ADULT - SUBJECTIVE AND OBJECTIVE BOX
INTERVAL HPI/OVERNIGHT EVENTS:  pt seen and examined  denies n/v/abd pain  no gib per rn, had brown bm  diet consistency downgraded    MEDICATIONS  (STANDING):  ALBUTerol    90 MICROgram(s) HFA Inhaler 2 Puff(s) Inhalation three times a day  atorvastatin 40 milliGRAM(s) Oral at bedtime  budesonide 160 MICROgram(s)/formoterol 4.5 MICROgram(s) Inhaler 2 Puff(s) Inhalation two times a day  furosemide   Injectable 40 milliGRAM(s) IV Push daily  latanoprost 0.005% Ophthalmic Solution 1 Drop(s) Both EYES at bedtime  metoprolol tartrate 12.5 milliGRAM(s) Oral two times a day  pantoprazole  Injectable 40 milliGRAM(s) IV Push daily  ranolazine 500 milliGRAM(s) Oral two times a day  simbrinza 1 Drop(s) 1 Drop(s) Right EYE two times a day  tiotropium 18 MICROgram(s) Capsule 1 Capsule(s) Inhalation daily    MEDICATIONS  (PRN):      Allergies    Doxycycline Hyclate (Unknown)  penicillin (Anaphylaxis)    Intolerances        Review of Systems:    General:  No wt loss, fevers, chills, night sweats, fatigue   Eyes:  Good vision, no reported pain  ENT:  No sore throat, pain, runny nose, dysphagia  CV:  no cp palps  Resp:  No dyspnea, cough, tachypnea, wheezing  GI:  No pain, No nausea, No vomiting, No diarrhea, No constipation, No weight loss, No fever, No pruritis, No rectal bleeding, No melena, No dysphagia  :  No pain, bleeding, incontinence, nocturia  Muscle:  No pain, weakness  Neuro:  No weakness, tingling, memory problems  Psych:  No fatigue, insomnia, mood problems, depression  Endocrine:  No polyuria, polydypsia, cold/heat intolerance  Heme:  No petechiae, ecchymosis, easy bruisability  Skin:  No rash, tattoos, scars, edema      Vital Signs Last 24 Hrs  T(C): 36.3 (16 Aug 2020 07:13), Max: 36.7 (15 Aug 2020 19:05)  T(F): 97.4 (16 Aug 2020 07:13), Max: 98 (15 Aug 2020 19:05)  HR: 99 (16 Aug 2020 07:13) (69 - 99)  BP: 112/75 (16 Aug 2020 07:13) (106/71 - 129/76)  BP(mean): --  RR: 16 (16 Aug 2020 07:13) (16 - 18)  SpO2: 98% (16 Aug 2020 07:13) (91% - 100%)    PHYSICAL EXAM:    Constitutional: lying in bed frail  HEENT: ncat  Neck: No LAD  Gastrointestinal: soft nt mild dt  Extremities: No peripheral edema  Vascular: + peripheral pulses  Neurological: Awake alert appropriate      LABS:                        8.6    13.11 )-----------( 293      ( 16 Aug 2020 07:35 )             27.6     08-16    137  |  101  |  52<H>  ----------------------------<  206<H>  4.2   |  26  |  2.10<H>    Ca    8.5      16 Aug 2020 07:35    TPro  6.8  /  Alb  2.9<L>  /  TBili  0.8  /  DBili  x   /  AST  43<H>  /  ALT  33  /  AlkPhos  211<H>  08-16    PT/INR - ( 15 Aug 2020 11:17 )   PT: 14.3 sec;   INR: 1.23 ratio         PTT - ( 15 Aug 2020 11:17 )  PTT:29.2 sec      RADIOLOGY & ADDITIONAL TESTS:

## 2020-08-17 NOTE — PROGRESS NOTE ADULT - ASSESSMENT
89 y/o M PMH severe CAD medically managed, Diastolic HF, CKD, Hld, COPD,  c/o 1 month of increased palpitations, MCCARTY, bilat SAM, found to be anemic    Severe CAD medically managed  - There is no evidence of acute ischemia.  - + trop x1 but second trop negative.  No need to trend  - EKG showed NSR with inferior Qwaves but no acute ischemic changes.  + bifascicular block but no evidence of AV block or significant bradycardia on tele.  Can discontinue telemetry  - No anginal complaints  - Hold ASA in the setting of significant anemia on presentation, especially in the absence of GI w/u  - Continue statin, BB, and Ranexa    Anemia  - Presented with HGb of 6.8 requiring PRBC's  - FOBT+  - GI eval noted.  No endoscopic procedure planned at this time in the setting of his advanced age and multiple comorbidities  - Okay to hold ASA.  Can resume when cleared by GI  - If transfused, please give in split unit    HFpEF  - + MCCARTY with LE extremity edema bilaterally this is multifactorial (HF, COPD, pulm htn)  in nature.    -  Respiratory status significantly improved.  Tolerating RA  - TTE 7/12/20 mild to mod MR, mild AS, mod concentric LVH, severe pul htn, mod to severe TR Ef 55%  - No need to repeat TTE as inpatient  - Now euvolemic.  Can switch IV Lasix to PO.  Please, give an additional 40 IV with each unit of PRBC  - We have had difficulty in the past diuresing him as he becomes hypotensive.  Need to monitor BP closely  - Monitor I, O, K, creatinine very closely.  Replete to keep K>4 and Mag>2    DVT ppx  - PAS for now    Will continue to follow    Zuri Lawrence DNP, NP-C  Cardiology   Spectra #[phone] 89 y/o M PMH severe CAD medically managed, Diastolic HF, CKD, Hld, COPD,  c/o 1 month of increased palpitations, MCCARTY, bilat SAM, found to be anemic    Severe CAD medically managed  - There is no evidence of acute ischemia.  - + trop x1 but second trop negative.  No need to trend  - EKG showed NSR with inferior Qwaves but no acute ischemic changes.  + bifascicular block but no evidence of AV block or significant bradycardia on tele.  Can discontinue telemetry  - No anginal complaints  - Hold ASA in the setting of significant anemia on presentation, especially in the absence of GI w/u  - Continue statin, BB, and Ranexa    Anemia  - Presented with HGb of 6.8 requiring PRBC's  - FOBT+  - GI eval noted.  No endoscopic procedure planned at this time in the setting of his advanced age and multiple comorbidities  - Okay to hold ASA.    - If transfused, please give in split unit    HFpEF  - + MCCARTY with LE extremity edema bilaterally this is multifactorial (HF, COPD, pulm htn)  in nature.    -  Respiratory status significantly improved.  Tolerating RA  - TTE 7/12/20 mild to mod MR, mild AS, mod concentric LVH, severe pul htn, mod to severe TR Ef 55%  - No need to repeat TTE as inpatient  - Now euvolemic.  Can switch IV Lasix to PO.  Please, give an additional 40 IV with each unit of PRBC  - We have had difficulty in the past diuresing him as he becomes hypotensive.  Need to monitor BP closely  - Monitor I, O, K, creatinine very closely.  Replete to keep K>4 and Mag>2    DVT ppx  - PAS for now    Will continue to follow    Zuri Lawrence DNP, NP-C  Cardiology   Spectra #[phone] 89 y/o M PMH severe CAD medically managed, Diastolic HF, CKD, Hld, COPD,  c/o 1 month of increased palpitations, MCCARTY, tricia VARGAS, found to be anemic    Severe CAD medically managed  - There is no evidence of acute ischemia.  - + trop x1 but second trop negative.  No need to trend  - EKG showed NSR with inferior Qwaves but no acute ischemic changes.  + bifascicular block but no evidence of AV block or significant bradycardia on tele.  Can discontinue telemetry  - No anginal complaints  - Hold ASA in the setting of significant anemia on presentation, especially in the absence of GI w/u  - Continue statin, BB, and Ranexa    Anemia  - Presented with HGb of 6.8 requiring PRBC's  - FOBT+  - GI eval noted.  No endoscopic procedure planned at this time in the setting of his advanced age and multiple comorbidities  - Okay to hold ASA.    - If transfused, please give in split unit    HFpEF  - + MCCARTY with LE extremity edema bilaterally this is multifactorial (HF, COPD, pulm htn)  in nature.    -  Respiratory status significantly improved.  Tolerating RA  - TTE 7/12/20 mild to mod MR, mild AS, mod concentric LVH, severe pul htn, mod to severe TR Ef 55%  - No need to repeat TTE as inpatient  - Now euvolemic.  Can switch IV Lasix to PO.  Please, give an additional 40 IV with each unit of PRBC  - We have had difficulty in the past diuresing him as he becomes hypotensive.  Need to monitor BP closely  - Monitor I, O, K, creatinine very closely.  Replete to keep K>4 and Mag>2    DVT ppx  - PAS for now    ADDENDUM: Patient now on comfort measures only.  Will sign off.  Please call if we could be of further help.    Zuri Lawrence DNP, NP-C  Cardiology   Spectra #[phone]

## 2020-08-17 NOTE — PROGRESS NOTE ADULT - SUBJECTIVE AND OBJECTIVE BOX
Patient is a 89y old  Male who presents with a chief complaint of Anemia, sent by PMD (17 Aug 2020 11:45)      INTERVAL HPI/OVERNIGHT EVENTS: Patient seen and examined at bedside. No overnight events occurred. Pt eating breakfast tolerating well. Small leg wound found on LLE, nursing would care consulted. Denies fevers, chills, headache, lightheadedness, chest pain, abdominal pain, n/v/d/c.      MEDICATIONS  (STANDING):  ALBUTerol    90 MICROgram(s) HFA Inhaler 2 Puff(s) Inhalation three times a day  atorvastatin 40 milliGRAM(s) Oral at bedtime  budesonide 160 MICROgram(s)/formoterol 4.5 MICROgram(s) Inhaler 2 Puff(s) Inhalation two times a day  latanoprost 0.005% Ophthalmic Solution 1 Drop(s) Both EYES at bedtime  metoprolol tartrate 12.5 milliGRAM(s) Oral two times a day  pantoprazole  Injectable 40 milliGRAM(s) IV Push daily  ranolazine 500 milliGRAM(s) Oral two times a day  simbrinza 1 Drop(s) 1 Drop(s) Right EYE two times a day  tiotropium 18 MICROgram(s) Capsule 1 Capsule(s) Inhalation daily    MEDICATIONS  (PRN):      Allergies    Doxycycline Hyclate (Unknown)  penicillin (Anaphylaxis)    Intolerances        REVIEW OF SYSTEMS:  CONSTITUTIONAL: No fever or chills  HEENT:  No headache, no sore throat  RESPIRATORY: Positive chronic SOB. No cough, wheezing.  CARDIOVASCULAR: No chest pain, palpitations  GASTROINTESTINAL: No abd pain, nausea, vomiting, or diarrhea  GENITOURINARY: No dysuria, frequency, or hematuria  NEUROLOGICAL: no focal weakness or dizziness  MUSCULOSKELETAL: no myalgias     Vital Signs Last 24 Hrs  T(C): 36.5 (17 Aug 2020 12:00), Max: 36.8 (17 Aug 2020 05:18)  T(F): 97.7 (17 Aug 2020 12:00), Max: 98.3 (17 Aug 2020 05:18)  HR: 101 (17 Aug 2020 12:00) (71 - 101)  BP: 116/76 (17 Aug 2020 12:00) (92/59 - 116/78)  BP(mean): --  RR: 19 (17 Aug 2020 12:00) (16 - 19)  SpO2: 99% (17 Aug 2020 12:00) (93% - 100%)    PHYSICAL EXAM:  GENERAL: elderly, mild distress, appropriate  EYES: sclera clear, no exudates  LUNGS: Poor effort and shallow breathing. Clear to auscultation, no rales, wheezing appreciated  HEART: S1/S2, regular rate and rhythm, no murmurs noted, +bilateral lower extremity edema- chronic, Pedal pulses palpable b/L  GASTROINTESTINAL: abdomen is soft, nontender, nondistended, normoactive bowel sounds, no palpable masses  INTEGUMENT: good skin turgor, warm, dry and intact  MUSCULOSKELETAL: no clubbing or cyanosis, no obvious deformity  NEUROLOGIC: awake, alert, oriented x3, no obvious sensory deficits  SKIN: blisters on b/l LE. New small weeping wound on LLE     LABS:                        8.2    13.60 )-----------( 252      ( 17 Aug 2020 07:27 )             26.3     CBC Full  -  ( 17 Aug 2020 07:27 )  WBC Count : 13.60 K/uL  Hemoglobin : 8.2 g/dL  Hematocrit : 26.3 %  Platelet Count - Automated : 252 K/uL  Mean Cell Volume : 84.0 fl  Mean Cell Hemoglobin : 26.2 pg  Mean Cell Hemoglobin Concentration : 31.2 gm/dL  Auto Neutrophil # : x  Auto Lymphocyte # : x  Auto Monocyte # : x  Auto Eosinophil # : x  Auto Basophil # : x  Auto Neutrophil % : x  Auto Lymphocyte % : x  Auto Monocyte % : x  Auto Eosinophil % : x  Auto Basophil % : x    17 Aug 2020 07:27    138    |  102    |  55     ----------------------------<  118    4.1     |  30     |  2.00     Ca    8.5        17 Aug 2020 07:27  Phos  3.9       17 Aug 2020 07:27  Mg     2.2       17 Aug 2020 07:27          CAPILLARY BLOOD GLUCOSE              RADIOLOGY & ADDITIONAL TESTS:    Personally reviewed.     Consultant(s) Notes Reviewed:  [x] YES  [ ] NO

## 2020-08-17 NOTE — PROGRESS NOTE ADULT - PROBLEM SELECTOR PLAN 1
- pos stool occult, but having formed brown stools   - GI () following, recs appreciated  -d/w patient and dtr at bedside, multiple significant medical co-morbidities including cad, chf, pulm htn and age would make endoscopic evaluation prohibitive  - Hgb improved. transfuse as needed  - PPI IV  -B12 and Folate wnl  -Palliative consult Dr Gary Marcano found goal to be home hospice care Palliative consult Dr Gary Marcano:   -Comfort care now  -DNR/DNI order placed  -No RR  -No vitals  -No labs  -Fentanyl patch 12mcg ordered  -DC atorvastatin after palliative consult. Pill too large and burdensome

## 2020-08-17 NOTE — CONSULT NOTE ADULT - ASSESSMENT
89yo male  with hx of CAD, COPD on 3L O2 via NC at home,sent by PMD due to anemia in setting of fatigue and Decreased exercise tolerance. Admitted for Sob likely due to symptomatic anemia vs heart failure/fluid overload. Ddx includes congestive heart  failure, worsening COPD.     1) Decreased PO intake   - feeding as tolerated  - no feeding tubes or IVF  - Trial of marinol as appetite stimulant if desired by pt/HCP    2) Dyspnea - multifactorial 2/2 advanced COPD, CHF, severe pulm HTN, severe anemia  - start fentanyl patch 12 mcg q72h  - dilaudid 1 mg PO q4h prn  - O2 via NC as per primary team    3) Failure to thrive  4) Goals of care/advance care planning  - Palliative performance scale score: 20% (poor)  - Prognosis: days-weeks (pt is hospice eligible)   - Code status: DNR/DNI ([facility] form filled out and placed in the chart)  - GOC: trial of IV antibiotics and IVF. Home hospice upon discharge.   - HCP: Kyia Gilbert (dtrs) (form placed in chart)  - Psychosocial support provided  - SW to start referral to home hospice    Appreciate consult. Discussed with the primary team.    Will continue to follow    Edvin Baum MD

## 2020-08-17 NOTE — PROGRESS NOTE ADULT - PROBLEM SELECTOR PLAN 6
PT with hx of chronic b/l le edema (10 years) p/w B/l lower extremity wounds likely 2/2 chronic venous stasis.  -Wound care consulted for new wound on LLE. f/u recs - holding asa for now  - continue home bblockers

## 2020-08-17 NOTE — CONSULT NOTE ADULT - SUBJECTIVE AND OBJECTIVE BOX
HPI:  89yo male with hx of COPD on 3L home O2, CAD, scoliosis, glaucoma sent by PMD due to low Hgb level (7.6). Patient had been experiencing decreased exercise tolerance, SOB, fatigue for about 1 month for unknown reason. He underwent work up by cardiologist (ekg, echo reported by daughter) and was told it was normal and did not explain his symptoms. He also followed with his pulmonologist who evaluated him (CXR was done per daughter, showed mild fluid in lining of lungs) but did not think his symptoms were due to his lungs or hx of COPD.  Denies melena, hematochezia, diarrhea, abdominal pain, unintentional weight loss. (15 Aug 2020 13:53)    PERTINENT PM/SXH:   COPD with hypoxia  Scoliosis  CAD, multiple vessel  High blood pressure  Reflux  High cholesterol    H/O inguinal hernia repair    FAMILY HISTORY:  Family history of early CAD    ITEMS NOT CHECKED ARE NOT PRESENT    SOCIAL HISTORY:   Significant other/partner[ ]  Children[ ]  Catholic/Spirituality:  Substance hx:  [ ]   Tobacco hx:  [ ]   Alcohol hx: [ ]   Home Opioid hx:  [ ] I-Stop Reference No:  Living Situation: [ ]Home  [ ]Long term care  [ ]Rehab [ ]Other    ADVANCE DIRECTIVES:    DNR  MOLST  [ ]  Living Will  [ ]   DECISION MAKER(s):  [ ] Health Care Proxy(s)  [ ] Surrogate(s)  [ ] Guardian           Name(s): Phone Number(s):    BASELINE (I)ADL(s) (prior to admission):  [place]: [ ]Total  [ ] Moderate [ ]Dependent    Allergies    Doxycycline Hyclate (Unknown)  penicillin (Anaphylaxis)    Intolerances    MEDICATIONS  (STANDING):  ALBUTerol    90 MICROgram(s) HFA Inhaler 2 Puff(s) Inhalation three times a day  atorvastatin 40 milliGRAM(s) Oral at bedtime  budesonide 160 MICROgram(s)/formoterol 4.5 MICROgram(s) Inhaler 2 Puff(s) Inhalation two times a day  latanoprost 0.005% Ophthalmic Solution 1 Drop(s) Both EYES at bedtime  metoprolol tartrate 12.5 milliGRAM(s) Oral two times a day  pantoprazole  Injectable 40 milliGRAM(s) IV Push daily  ranolazine 500 milliGRAM(s) Oral two times a day  simbrinza 1 Drop(s) 1 Drop(s) Right EYE two times a day  tiotropium 18 MICROgram(s) Capsule 1 Capsule(s) Inhalation daily    MEDICATIONS  (PRN):    PRESENT SYMPTOMS: [ ]Unable to obtain due to poor mentation   Source if other than patient:  [ ]Family   [ ]Team     Pain: [ ]yes [ ]no  QOL impact -   Location -                    Aggravating factors -  Quality -  Radiation -  Timing-  Severity (0-10 scale):  Minimal acceptable level (0-10 scale):     CPOT:    https://www.[facility].org/getattachment/[email or web address]/Critical-Care-Pain-Observation-Tool-(CPOT)      PAIN AD Score:     http://geriatrictoolkit.[facility]/cog/painad.pdf (press ctrl +  left click to view)    Dyspnea:                           [ ]Mild [ ]Moderate [ ]Severe  Anxiety:                             [ ]Mild [ ]Moderate [ ]Severe  Fatigue:                             [ ]Mild [ ]Moderate [ ]Severe  Nausea:                             [ ]Mild [ ]Moderate [ ]Severe  Loss of appetite:              [ ]Mild [ ]Moderate [ ]Severe  Constipation:                    [ ]Mild [ ]Moderate [ ]Severe    Other Symptoms:  [ ]All other review of systems negative     Palliative Performance Status Version 2:         %    http://npcrc.org/files/news/palliative_performance_scale_ppsv2.pdf  PHYSICAL EXAM:  Vital Signs Last 24 Hrs  T(C): 36.5 (17 Aug 2020 07:00), Max: 36.8 (17 Aug 2020 05:18)  T(F): 97.7 (17 Aug 2020 07:00), Max: 98.3 (17 Aug 2020 05:18)  HR: 71 (17 Aug 2020 07:00) (71 - 96)  BP: 110/66 (17 Aug 2020 07:00) (92/59 - 117/78)  BP(mean): --  RR: 19 (17 Aug 2020 07:00) (16 - 19)  SpO2: 98% (17 Aug 2020 07:00) (93% - 100%) I&O's Summary    GENERAL:  [ ]Alert  [ ]Oriented x   [ ]Lethargic  [ ]Cachexia  [ ]Unarousable  [ ]Verbal  [ ]Non-Verbal  Behavioral:   [ ] Anxiety  [ ] Delirium [ ] Agitation [ ] Other  HEENT:  [ ]Normal   [ ]Dry mouth   [ ]ET Tube/Trach  [ ]Oral lesions  PULMONARY:   [ ]Clear [ ]Tachypnea  [ ]Audible excessive secretions   [ ]Rhonchi        [ ]Right [ ]Left [ ]Bilateral  [ ]Crackles        [ ]Right [ ]Left [ ]Bilateral  [ ]Wheezing     [ ]Right [ ]Left [ ]Bilateral  [ ]Diminished breath sounds [ ]right [ ]left [ ]bilateral  CARDIOVASCULAR:    [ ]Regular [ ]Irregular [ ]Tachy  [ ]Kei [ ]Murmur [ ]Other  GASTROINTESTINAL:  [ ]Soft  [ ]Distended   [ ]+BS  [ ]Non tender [ ]Tender  [ ]PEG [ ]OGT/ NGT  Last BM:     GENITOURINARY:  [ ]Normal [ ] Incontinent   [ ]Oliguria/Anuria   [ ]Georges  MUSCULOSKELETAL:   [ ]Normal   [ ]Weakness  [ ]Bed/Wheelchair bound [ ]Edema  NEUROLOGIC:   [ ]No focal deficits  [ ]Cognitive impairment  [ ]Dysphagia [ ]Dysarthria [ ]Paresis [ ]Other   SKIN:   [ ]Normal    [ ]Rash  [ ]Pressure ulcer(s)       Present on admission [ ]y [ ]n    CRITICAL CARE:  [ ] Shock Present  [ ]Septic [ ]Cardiogenic [ ]Neurologic [ ]Hypovolemic  [ ]  Vasopressors [ ]  Inotropes   [ ]Respiratory failure present [ ]Mechanical ventilation [ ]Non-invasive ventilatory support [ ]High flow    [ ]Acute  [ ]Chronic [ ]Hypoxic  [ ]Hypercarbic [ ]Other  [ ]Other organ failure     LABS:                        8.2    13.60 )-----------( 252      ( 17 Aug 2020 07:27 )             26.3   08-17    138  |  102  |  55<H>  ----------------------------<  118<H>  4.1   |  30  |  2.00<H>    Ca    8.5      17 Aug 2020 07:27  Phos  3.9     08-17  Mg     2.2     08-17    TPro  6.8  /  Alb  2.9<L>  /  TBili  0.8  /  DBili  x   /  AST  43<H>  /  ALT  33  /  AlkPhos  211<H>  08-16        RADIOLOGY & ADDITIONAL STUDIES:    PROTEIN CALORIE MALNUTRITION PRESENT: [ ]mild [ ]moderate [ ]severe [ ]underweight [ ]morbid obesity  https://www.andeal.org/vault/2440/web/files/ONC/Table_Clinical%20Characteristics%20to%20Document%20Malnutrition-White%20JV%20et%20al%202012.pdf    Height (cm): 162.56 (08-15-20 @ 10:36)  Weight (kg): 70.3 (08-15-20 @ 10:36), 72.6 (02-26-20 @ 14:01)  BMI (kg/m2): 26.6 (08-15-20 @ 10:36)    [ ]PPSV2 < or = to 30% [ ]significant weight loss  [ ]poor nutritional intake  [ ]anasarca     Albumin, Serum: 2.9 g/dL (08-16-20 @ 07:35)   [ ]Artificial Nutrition      REFERRALS:   [ ]Chaplaincy  [ ]Hospice  [ ]Child Life  [ ]Social Work  [ ]Case management [ ]Holistic Therapy     Goals of Care Document: HPI:  89yo male with hx of COPD on 3L home O2, CAD, scoliosis, glaucoma sent by PMD due to low Hgb level (7.6). Patient had been experiencing decreased exercise tolerance, SOB, fatigue for about 1 month for unknown reason. He underwent work up by cardiologist (ekg, echo reported by daughter) and was told it was normal and did not explain his symptoms. He also followed with his pulmonologist who evaluated him (CXR was done per daughter, showed mild fluid in lining of lungs) but did not think his symptoms were due to his lungs or hx of COPD.  Denies melena, hematochezia, diarrhea, abdominal pain, unintentional weight loss. (15 Aug 2020 13:53)    PERTINENT PM/SXH:   COPD with hypoxia  Scoliosis  CAD, multiple vessel  High blood pressure  Reflux  High cholesterol    H/O inguinal hernia repair    FAMILY HISTORY:  Family history of early CAD    ITEMS NOT CHECKED ARE NOT PRESENT    SOCIAL HISTORY:   Significant other/partner[ ]  Children[x ]  Roman Catholic/Spirituality: Episcopal  Substance hx:  [ ]   Tobacco hx:  [ ]   Alcohol hx: [ ]   Home Opioid hx:  [ ] I-Stop Reference No:  Living Situation: [x ]Home  [ ]Long term care  [ ]Rehab [ ]Other    ADVANCE DIRECTIVES:    DNR  MOLST  [ ]  Living Will  [ ]   DECISION MAKER(s):  [ x] Health Care Proxy(s)  [ ] Surrogate(s)  [ ] Guardian           Name(s): 1ry Ofelia [phone]; 2ry MICHAEL [phone] (daughters)     BASELINE (I)ADL(s) (prior to admission):  [place]: [ ]Total  [x ] Moderate [ ]Dependent    Allergies    Doxycycline Hyclate (Unknown)  penicillin (Anaphylaxis)    Intolerances    MEDICATIONS  (STANDING):  ALBUTerol    90 MICROgram(s) HFA Inhaler 2 Puff(s) Inhalation three times a day  atorvastatin 40 milliGRAM(s) Oral at bedtime  budesonide 160 MICROgram(s)/formoterol 4.5 MICROgram(s) Inhaler 2 Puff(s) Inhalation two times a day  latanoprost 0.005% Ophthalmic Solution 1 Drop(s) Both EYES at bedtime  metoprolol tartrate 12.5 milliGRAM(s) Oral two times a day  pantoprazole  Injectable 40 milliGRAM(s) IV Push daily  ranolazine 500 milliGRAM(s) Oral two times a day  simbrinza 1 Drop(s) 1 Drop(s) Right EYE two times a day  tiotropium 18 MICROgram(s) Capsule 1 Capsule(s) Inhalation daily    MEDICATIONS  (PRN):    PRESENT SYMPTOMS: [ ]Unable to obtain due to poor mentation   Source if other than patient:  [ ]Family   [ ]Team     Pain: [ ]yes [x ]no  QOL impact -   Location -                    Aggravating factors -  Quality -  Radiation -  Timing-  Severity (0-10 scale):  Minimal acceptable level (0-10 scale):     CPOT:    https://www.[facility].org/getattachment/[email or web address]/Critical-Care-Pain-Observation-Tool-(CPOT)      PAIN AD Score:     http://geriatrictoolkit.[facility]/cog/painad.pdf (press ctrl +  left click to view)    Dyspnea:                           [ ]Mild [ ]Moderate [x]Severe  Anxiety:                             [ ]Mild [x ]Moderate [ ]Severe  Fatigue:                             [ ]Mild [ x]Moderate [ ]Severe  Nausea:                             [ ]Mild [ ]Moderate [ ]Severe  Loss of appetite:              [x ]Mild [ ]Moderate [ ]Severe  Constipation:                    [ ]Mild [ ]Moderate [ ]Severe    Other Symptoms:  [x ]All other review of systems negative     Palliative Performance Status Version 2:        40 %    http://npcrc.org/files/news/palliative_performance_scale_ppsv2.pdf  PHYSICAL EXAM:  Vital Signs Last 24 Hrs  T(C): 36.5 (17 Aug 2020 07:00), Max: 36.8 (17 Aug 2020 05:18)  T(F): 97.7 (17 Aug 2020 07:00), Max: 98.3 (17 Aug 2020 05:18)  HR: 71 (17 Aug 2020 07:00) (71 - 96)  BP: 110/66 (17 Aug 2020 07:00) (92/59 - 117/78)  BP(mean): --  RR: 19 (17 Aug 2020 07:00) (16 - 19)  SpO2: 98% (17 Aug 2020 07:00) (93% - 100%) I&O's Summary    GENERAL: elderly, mild distress 2/2 SOB, appropriate for age, inclined in bed, RR 36  EYES: EOMI, PERRL, sclera clear, no exudates  LUNGS: Poor effort and shallow breathing. Clear to auscultation, no rales, wheezing appreciated  HEART: S1/S2, regular rate and rhythm, no murmurs noted, +bilateral lower extremity edema- chronic, Pedal pulses palpable b/L  GASTROINTESTINAL: abdomen is soft, nontender, nondistended, normoactive bowel sounds, no palpable masses  INTEGUMENT: good skin turgor, warm, dry and intact  MUSCULOSKELETAL: no clubbing or cyanosis, no obvious deformity  NEUROLOGIC: awake, alert, oriented x3, no obvious sensory deficits  SKIN: blisters on b/l LE. New small weeping wound on LLE     LABS:                        8.2    13.60 )-----------( 252      ( 17 Aug 2020 07:27 )             26.3   08-17    138  |  102  |  55<H>  ----------------------------<  118<H>  4.1   |  30  |  2.00<H>    Ca    8.5      17 Aug 2020 07:27  Phos  3.9     08-17  Mg     2.2     08-17    TPro  6.8  /  Alb  2.9<L>  /  TBili  0.8  /  DBili  x   /  AST  43<H>  /  ALT  33  /  AlkPhos  211<H>  08-16        RADIOLOGY & ADDITIONAL STUDIES: reviewed    PROTEIN CALORIE MALNUTRITION PRESENT: [ ]mild [ ]moderate [ ]severe [ ]underweight [ ]morbid obesity  https://www.andeal.org/vault/2440/web/files/ONC/Table_Clinical%20Characteristics%20to%20Document%20Malnutrition-White%20JV%20et%20al%202012.pdf    Height (cm): 162.56 (08-15-20 @ 10:36)  Weight (kg): 70.3 (08-15-20 @ 10:36), 72.6 (02-26-20 @ 14:01)  BMI (kg/m2): 26.6 (08-15-20 @ 10:36)    [ ]PPSV2 < or = to 30% [ ]significant weight loss  [x ]poor nutritional intake  [ ]anasarca     Albumin, Serum: 2.9 g/dL (08-16-20 @ 07:35)   [ ]Artificial Nutrition      REFERRALS:   [ ]Chaplaincy  [ x]Hospice  [ ]Child Life  [ x]Social Work  [ ]Case management [ ]Holistic Therapy     Goals of Care Document:

## 2020-08-17 NOTE — CHART NOTE - NSCHARTNOTEFT_GEN_A_CORE
Called by RN for increased work of breathing. Patient was on 3L nasal cannula and desatted to 82%. Nasal cannula was increased to 4 L and saturation increased to 92%. Patient seen and examined at bedside. Per chart review, pt is to be on comfort measures only, DNR/DNI.  Denies any chest pain, shortness of breath, leg pain/swelling.        T(C): 36.6 (08-17-20 @ 19:41), Max: 36.8 (08-17-20 @ 05:18)  HR: 105 (08-17-20 @ 19:41) (71 - 106)  BP: 108/74 (08-17-20 @ 19:41) (92/59 - 116/78)  RR: 20 (08-17-20 @ 19:41) (17 - 20)  SpO2: 92% (08-17-20 @ 19:41) (92% - 99%)  Wt(kg): --    Physical :  Gen- Pale, cachetic elderly male in hospital bed, using accessory muscles to breath  Cardio - s+1,s+2, rrr, no murmur  Lung - cta b/l, no wheeze, no rhonchi, no rales, diminished breath sounds Right lower lung  Abdomen- +BS, NT/ND, no guarding, no rebound, no masses  Ext- no edema, 2+ pulses b/l  Neuro- CN grossly intact, strength 5/5 b/l extrem, AAOx3                    Assessment/Plan  89yo male  with hx of CAD, COPD sent by PMD due to anemia in setting of fatigue and Decreased exercise tolerance. Admitted for Sob likely due to symptomatic anemia vs heart failure/fluid overload. Ddx includes congestive heart  failure, worsening COPD.     1.  0.5 mg morphine ordered for respiratory distress. Spoke to daughter Ofelia on phone, agrees to make patient comfortable.  2. Patient is comfort care only, DNR/DNI, no rapid responses    RN to call for any clinical changes.

## 2020-08-17 NOTE — DISCHARGE NOTE PROVIDER - CARE PROVIDER_API CALL
Abhijeet Wyatt  CARDIOVASCULAR DISEASE  [address] [place] [address]  Phone: [phone]  Fax: [phone]  Follow Up Time:

## 2020-08-17 NOTE — PROGRESS NOTE ADULT - SUBJECTIVE AND OBJECTIVE BOX
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, 3L  Scoliosis  CAD, multiple vessel  Reflux  H/O inguinal hernia repair    MEDICATIONS  (STANDING):  ALBUTerol    90 MICROgram(s) HFA Inhaler 2 Puff(s) Inhalation three times a day  atorvastatin 40 milliGRAM(s) Oral at bedtime  budesonide 160 MICROgram(s)/formoterol 4.5 MICROgram(s) Inhaler 2 Puff(s) Inhalation two times a day  latanoprost 0.005% Ophthalmic Solution 1 Drop(s) Both EYES at bedtime  metoprolol tartrate 12.5 milliGRAM(s) Oral two times a day  pantoprazole  Injectable 40 milliGRAM(s) IV Push daily  ranolazine 500 milliGRAM(s) Oral two times a day  simbrinza 1 Drop(s) 1 Drop(s) Right EYE two times a day  tiotropium 18 MICROgram(s) Capsule 1 Capsule(s) Inhalation daily    MEDICATIONS  (PRN):    Allergies    Doxycycline Hyclate (Unknown)  penicillin (Anaphylaxis)    Intolerance    Vital Signs Last 24 Hrs  T(C): 36.5 (17 Aug 2020 07:00), Max: 36.8 (17 Aug 2020 05:18)  T(F): 97.7 (17 Aug 2020 07:00), Max: 98.3 (17 Aug 2020 05:18)  HR: 71 (17 Aug 2020 07:00) (71 - 96)  BP: 110/66 (17 Aug 2020 07:00) (92/59 - 117/78)  BP(mean): --  RR: 19 (17 Aug 2020 07:00) (16 - 19)  SpO2: 98% (17 Aug 2020 07:00) (93% - 100%)  I&O's Summary      PHYSICAL EXAM:  TELE: NSR  Constitutional: NAD, awake and alert, well-developed  HEENT: Moist Mucous Membranes, Anicteric  Pulmonary: Non-labored, breath sounds are clear but diminished bilaterally, No wheezing, rales or rhonchi  Cardiovascular: Regular, S1 and S2, No murmurs, rubs, gallops or clicks  Gastrointestinal: Bowel Sounds present, soft, nontender.   Lymph: No peripheral edema. No lymphadenopathy.  Skin: No visible rashes or ulcers.  Pale in color  Psych:  Mood & affect appropriate  LABS: All Labs Reviewed:                        8.2    13.60 )-----------( 252      ( 17 Aug 2020 07:27 )             26.3                         8.6    13.11 )-----------( 293      ( 16 Aug 2020 07:35 )             27.6                         6.8    11.34 )-----------( 236      ( 15 Aug 2020 11:22 )             22.7     17 Aug 2020 07:27    138    |  102    |  55     ----------------------------<  118    4.1     |  30     |  2.00   16 Aug 2020 07:35    137    |  101    |  52     ----------------------------<  206    4.2     |  26     |  2.10   15 Aug 2020 11:17    138    |  103    |  53     ----------------------------<  119    4.2     |  28     |  2.00     Ca    8.5        17 Aug 2020 07:27  Ca    8.5        16 Aug 2020 07:35  Ca    8.4        15 Aug 2020 11:17  Phos  3.9       17 Aug 2020 07:27  Mg     2.2       17 Aug 2020 07:27    TPro  6.8    /  Alb  2.9    /  TBili  0.8    /  DBili  x      /  AST  43     /  ALT  33     /  AlkPhos  211    16 Aug 2020 07:35  TPro  6.9    /  Alb  2.9    /  TBili  0.6    /  DBili  x      /  AST  21     /  ALT  20     /  AlkPhos  151    15 Aug 2020 11:17    CARDIAC MARKERS ( 15 Aug 2020 17:01 )  .038 ng/mL / x     / x     / x     / x        < from: Xray Chest 1 View- PORTABLE-Routine (08.15.20 @ 11:35) >    EXAM:  XR CHEST PORTABLE ROUTINE 1V                          PROCEDURE DATE:  08/15/2020      INTERPRETATION:  CLINICAL INFORMATION: Palpitations, dyspnea on exertion.    A frontal view of the chest was obtained.    Comparison: 2/26/2020.    IMPRESSION:    The mediastinal cardiac silhouette is enlarged.    Elevated right hemidiaphragm unchanged. Small right effusion. Bibasilar atelectasis.    Air distended colon partially imaged.    No acute osseous finding.    GARETT HERNANDEZ M.D., ATTENDING RADIOLOGIST  This document has been electronically signed. Aug 15 2020 11:47AM    < end of copied text >    < from: 12 Lead ECG (08.15.20 @ 10:58) >    Ventricular Rate 73 BPM    Atrial Rate 73 BPM    P-R Interval 214 ms    QRS Duration 130 ms    Q-T Interval 454 ms    QTC Calculation(Bezet) 500 ms    P Axis 31 degrees    R Axis -73 degrees    T Axis -21 degrees    Diagnosis Line Sinus rhythm [id number] degree AV block  Left axis deviation  Right bundle branch block  Inferior infarct (cited on or before 13-OCT-2015)  Anterolateral infarct , age undetermined  Confirmed by REMINGTON DOE ([id number]) on 8/15/2020 12:08:17 PM    < end of copied text >      < from: TTE Echo Doppler w/o Cont (10.14.15 @ 14:52) >     EXAM:  ECHO TTE W/O CON COMP W/DOPPLR           PROCEDURE DATE:  10/14/2015      INTERPRETATION:  INDICATION: aortic valve disease    Blood Pressure        Height 160     Weight 71       BSA 1.7    Dimensions:    LA 2.6       Normal Values: 2.0 - 4.0 cm    Ao 3.5        Normal Values: 2.0 - 3.8 cm  SEPTUM 1.2       Normal Values: 0.6 - 1.2 cm  PWT 1.2       Normal Values: 0.6 - 1.1 cm  LVIDd 4.0         Normal Values: 3.0 - 5.6 cm  LVIDs 2.7         Normal Values: 1.8 - 4.0 cm    Derived Variables:  LVMI     g/m2  RWT      Fractional Short      Ejection Fraction 60    Doppler Peak v. AoV=   (m/sec)    OBSERVATIONS:    Mitral Valve: Thickened leaflets, 1+ MR.  Aortic Valve/Aorta: Calcified trileaflet aortic valve.  Tricuspid Valve: normal with 2+ TR.  Pulmonic Valve: 2+ TX  Left Atrium: normal  Right Atrium: normal  Left Ventricle: Mild concentric LVH with normal systolic function,   estimated LVEF of 60 %.  Right Ventricle: normal size and systolic function.  Pericardium/Pleura: normal, no significant pericardial effusion.  Pulmonary/RV Pressure: estimated PA systolic pressure of 43 mmHg assuming   an RA pressure of 10 mmHg.  LV Diastolic Function: Grade 1 diastolic dysfunction.    Mild concentric LVH with normal systolic function,estimated LVEF of 60   %. Normal right ventricular size and systolic function. Grade 1 diastolic   dysfunction. Normal biatrial size. The aortic root is normal in size. The   mitral valve leaflets are thickened, 1+ MR. The aortic valve is calcified   without stenosis or insufficiency. 2+  TR. Estimated PA systolic pressure   is 43mm Hg, assuming an RA pressure of 10 mmHg. 2+ TX.  No significant   pericardial effusion.    ALEXIS MORALES M.D., ATTENDING CARDIOLOGIST  This document hasbeen electronically signed. Oct 15 2015  5:41P        < end of copied text > [facility] -- Rogers Morgan, Anitra, Edmundo, Eb, Nathaly Melo Goodger  Office # [phone]    Follow Up:  CAD, CHF, Preop Optimization    Subjective/Observations: Patient seen and examined. Events noted. Resting comfortably in bed. No complaints of chest pain, dyspnea, or palpitations reported. No signs of orthopnea or PND.     REVIEW OF SYSTEMS: all neg except above.   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, 3L  Scoliosis  CAD, multiple vessel  Reflux  H/O inguinal hernia repair    MEDICATIONS  (STANDING):  ALBUTerol    90 MICROgram(s) HFA Inhaler 2 Puff(s) Inhalation three times a day  atorvastatin 40 milliGRAM(s) Oral at bedtime  budesonide 160 MICROgram(s)/formoterol 4.5 MICROgram(s) Inhaler 2 Puff(s) Inhalation two times a day  latanoprost 0.005% Ophthalmic Solution 1 Drop(s) Both EYES at bedtime  metoprolol tartrate 12.5 milliGRAM(s) Oral two times a day  pantoprazole  Injectable 40 milliGRAM(s) IV Push daily  ranolazine 500 milliGRAM(s) Oral two times a day  simbrinza 1 Drop(s) 1 Drop(s) Right EYE two times a day  tiotropium 18 MICROgram(s) Capsule 1 Capsule(s) Inhalation daily    MEDICATIONS  (PRN):    Allergies    Doxycycline Hyclate (Unknown)  penicillin (Anaphylaxis)    Intolerance    Vital Signs Last 24 Hrs  T(C): 36.5 (17 Aug 2020 07:00), Max: 36.8 (17 Aug 2020 05:18)  T(F): 97.7 (17 Aug 2020 07:00), Max: 98.3 (17 Aug 2020 05:18)  HR: 71 (17 Aug 2020 07:00) (71 - 96)  BP: 110/66 (17 Aug 2020 07:00) (92/59 - 117/78)  BP(mean): --  RR: 19 (17 Aug 2020 07:00) (16 - 19)  SpO2: 98% (17 Aug 2020 07:00) (93% - 100%)  I&O's Summary      PHYSICAL EXAM:  TELE: NSR  Constitutional: NAD, awake   HEENT: Moist Mucous Membranes, Anicteric  Pulmonary: Decreased breath sounds b/l. No rales, crackles or wheeze appreciated.   Cardiovascular: Regular, S1 and S2, No murmurs, rubs, gallops or clicks  Gastrointestinal: Bowel Sounds present, soft, nontender.   Lymph: No peripheral edema. No lymphadenopathy.  Skin: No visible rashes or ulcers.  Pale in color  Psych:  Mood & affect appropriate  LABS: All Labs Reviewed:                        8.2    13.60 )-----------( 252      ( 17 Aug 2020 07:27 )             26.3                         8.6    13.11 )-----------( 293      ( 16 Aug 2020 07:35 )             27.6                         6.8    11.34 )-----------( 236      ( 15 Aug 2020 11:22 )             22.7     17 Aug 2020 07:27    138    |  102    |  55     ----------------------------<  118    4.1     |  30     |  2.00   16 Aug 2020 07:35    137    |  101    |  52     ----------------------------<  206    4.2     |  26     |  2.10   15 Aug 2020 11:17    138    |  103    |  53     ----------------------------<  119    4.2     |  28     |  2.00     Ca    8.5        17 Aug 2020 07:27  Ca    8.5        16 Aug 2020 07:35  Ca    8.4        15 Aug 2020 11:17  Phos  3.9       17 Aug 2020 07:27  Mg     2.2       17 Aug 2020 07:27    TPro  6.8    /  Alb  2.9    /  TBili  0.8    /  DBili  x      /  AST  43     /  ALT  33     /  AlkPhos  211    16 Aug 2020 07:35  TPro  6.9    /  Alb  2.9    /  TBili  0.6    /  DBili  x      /  AST  21     /  ALT  20     /  AlkPhos  151    15 Aug 2020 11:17    CARDIAC MARKERS ( 15 Aug 2020 17:01 )  .038 ng/mL / x     / x     / x     / x        < from: Xray Chest 1 View- PORTABLE-Routine (08.15.20 @ 11:35) >    EXAM:  XR CHEST PORTABLE ROUTINE 1V                          PROCEDURE DATE:  08/15/2020      INTERPRETATION:  CLINICAL INFORMATION: Palpitations, dyspnea on exertion.    A frontal view of the chest was obtained.    Comparison: 2/26/2020.    IMPRESSION:    The mediastinal cardiac silhouette is enlarged.    Elevated right hemidiaphragm unchanged. Small right effusion. Bibasilar atelectasis.    Air distended colon partially imaged.    No acute osseous finding.    GARETT HERNANDEZ M.D., ATTENDING RADIOLOGIST  This document has been electronically signed. Aug 15 2020 11:47AM    < end of copied text >    < from: 12 Lead ECG (08.15.20 @ 10:58) >    Ventricular Rate 73 BPM    Atrial Rate 73 BPM    P-R Interval 214 ms    QRS Duration 130 ms    Q-T Interval 454 ms    QTC Calculation(Bezet) 500 ms    P Axis 31 degrees    R Axis -73 degrees    T Axis -21 degrees    Diagnosis Line Sinus rhythm [id number] degree AV block  Left axis deviation  Right bundle branch block  Inferior infarct (cited on or before 13-OCT-2015)  Anterolateral infarct , age undetermined  Confirmed by REMINGTON DOE ([id number]) on 8/15/2020 12:08:17 PM    < end of copied text >      < from: TTE Echo Doppler w/o Cont (10.14.15 @ 14:52) >     EXAM:  ECHO TTE W/O CON COMP W/DOPPLR           PROCEDURE DATE:  10/14/2015      INTERPRETATION:  INDICATION: aortic valve disease    Blood Pressure        Height 160     Weight 71       BSA 1.7    Dimensions:    LA 2.6       Normal Values: 2.0 - 4.0 cm    Ao 3.5        Normal Values: 2.0 - 3.8 cm  SEPTUM 1.2       Normal Values: 0.6 - 1.2 cm  PWT 1.2       Normal Values: 0.6 - 1.1 cm  LVIDd 4.0         Normal Values: 3.0 - 5.6 cm  LVIDs 2.7         Normal Values: 1.8 - 4.0 cm    Derived Variables:  LVMI     g/m2  RWT      Fractional Short      Ejection Fraction 60    Doppler Peak v. AoV=   (m/sec)    OBSERVATIONS:    Mitral Valve: Thickened leaflets, 1+ MR.  Aortic Valve/Aorta: Calcified trileaflet aortic valve.  Tricuspid Valve: normal with 2+ TR.  Pulmonic Valve: 2+ IA  Left Atrium: normal  Right Atrium: normal  Left Ventricle: Mild concentric LVH with normal systolic function,   estimated LVEF of 60 %.  Right Ventricle: normal size and systolic function.  Pericardium/Pleura: normal, no significant pericardial effusion.  Pulmonary/RV Pressure: estimated PA systolic pressure of 43 mmHg assuming   an RA pressure of 10 mmHg.  LV Diastolic Function: Grade 1 diastolic dysfunction.    Mild concentric LVH with normal systolic function,estimated LVEF of 60   %. Normal right ventricular size and systolic function. Grade 1 diastolic   dysfunction. Normal biatrial size. The aortic root is normal in size. The   mitral valve leaflets are thickened, 1+ MR. The aortic valve is calcified   without stenosis or insufficiency. 2+  TR. Estimated PA systolic pressure   is 43mm Hg, assuming an RA pressure of 10 mmHg. 2+ IA.  No significant   pericardial effusion.    ALEXIS MORALES M.D., ATTENDING CARDIOLOGIST  This document hasbeen electronically signed. Oct 15 2015  5:41P        < end of copied text >

## 2020-08-17 NOTE — PROGRESS NOTE ADULT - ASSESSMENT
89yo male  with hx of CAD, COPD sent by PMD due to anemia in setting of fatigue and Decreased exercise tolerance. Admitted for Sob likely due to symptomatic anemia vs heart failure/fluid overload. Ddx includes congestive heart  failure, worsening COPD.

## 2020-08-17 NOTE — DIETITIAN INITIAL EVALUATION ADULT. - OTHER INFO
89 year old male dx anemia with history COPD O2 at home CAD reflux patient sleeping not interviewed . per RN low PO today. RD spoke with family patient was eating well up until 1 week PTA eats solid foods thin liquids PTA. was taking chix fillet and food from restaurants. with HHA at home . dislikes puree diet RD spoke to MD to change diet to mechanical soft thin liquids ( partial dentures at home patient did not want them in hospital per family) other food preferences noted. patient seen with some mild clavicle muscle mass loss ? possibly age related. no edema 8/17 fecal incontinence  NKFA

## 2020-08-17 NOTE — PHYSICAL THERAPY INITIAL EVALUATION ADULT - PERTINENT HX OF CURRENT PROBLEM, REHAB EVAL
89yo male with hx of COPD on 3L home O2, CAD, scoliosis, glaucoma sent by PMD due to low Hgb level (7.6). Patient had been experiencing decreased exercise tolerance, SOB, fatigue for about 1 month for unknown reason.

## 2020-08-17 NOTE — DISCHARGE NOTE PROVIDER - NSDCFUSCHEDAPPT_GEN_ALL_CORE_FT
JACK GARZA ; 10/01/2020 ; [facility] ; 10/01/2020 ; [facility] ; 10/01/2020 ; [facility] ; 10[facility]01/2020 ; [facility] ; 10[facility]01/2020 ; [facility] ; 10/01/2020 ; [facility]

## 2020-08-17 NOTE — DISCHARGE NOTE PROVIDER - NSDCCPCAREPLAN_GEN_ALL_CORE_FT
PRINCIPAL DISCHARGE DIAGNOSIS  Diagnosis: Symptomatic anemia  Assessment and Plan of Treatment:       SECONDARY DISCHARGE DIAGNOSES  Diagnosis: Symptomatic anemia  Assessment and Plan of Treatment:     Diagnosis: CHF, acute on chronic  Assessment and Plan of Treatment:     Diagnosis: Advanced COPD  Assessment and Plan of Treatment:     Diagnosis: LANE (acute kidney injury)  Assessment and Plan of Treatment:     Diagnosis: CAD (coronary artery disease)  Assessment and Plan of Treatment:     Diagnosis: Wound finding  Assessment and Plan of Treatment:     Diagnosis: GERD (gastroesophageal reflux disease)  Assessment and Plan of Treatment:     Diagnosis: Scoliosis  Assessment and Plan of Treatment: PRINCIPAL DISCHARGE DIAGNOSIS  Diagnosis: Symptomatic anemia  Assessment and Plan of Treatment: You presented with shortness of breath and anemia. We found signs of blood in your stool. We were unable to perform an endoscopy due to your advanced age and comorbidities. We managed you conservatively IV medications and treated your symptoms with supplemental oxygen.        SECONDARY DISCHARGE DIAGNOSES  Diagnosis: CHF, acute on chronic  Assessment and Plan of Treatment: This is a chronic condition you have that can be a cause of your shortness of breath. We gave you IV medications to improve your heart function that resolved your symptoms.    Diagnosis: Advanced COPD  Assessment and Plan of Treatment: Continue your home  Continue your home supplemental oxygen settings    Diagnosis: LANE (acute kidney injury)  Assessment and Plan of Treatment: Your kidney function was poor on admission that could be attributed to your poor heart function or anemia. Once we treated the possible causes your kidney function improved and stablaized.    Diagnosis: CAD (coronary artery disease)  Assessment and Plan of Treatment: Continue your home medication Ranolazine and Metoprolol    Diagnosis: Wound finding  Assessment and Plan of Treatment: Your wounds on your legs were treated    Diagnosis: GERD (gastroesophageal reflux disease)  Assessment and Plan of Treatment: Continue your home medication of Prevacid    Diagnosis: Scoliosis  Assessment and Plan of Treatment: This is a chronic issue that we did not treat PRINCIPAL DISCHARGE DIAGNOSIS  Diagnosis: Symptomatic anemia  Assessment and Plan of Treatment: -You presented with shortness of breath and anemia. We found signs of blood in your stool. We were unable to perform an endoscopy due to your advanced age and comorbidities. We managed you conservatively IV medications and treated your symptoms with supplemental oxygen.  -You are being discharged home with home hospice, plan as per home hospice         SECONDARY DISCHARGE DIAGNOSES  Diagnosis: CHF, acute on chronic  Assessment and Plan of Treatment: This is a chronic condition you have that can be a cause of your shortness of breath. We gave you IV medications to improve your heart function that resolved your symptoms.  -Continue with your medication Lasix, metoprolol    -You are being discharged home with home hospice, plan as per home hospice    Diagnosis: Advanced COPD  Assessment and Plan of Treatment: -Continue your home inhalers as needed   -Continue your home supplemental oxygen settings   -You are being discharged home with home hospice, plan as per home hospice    Diagnosis: LANE (acute kidney injury)  Assessment and Plan of Treatment: Your kidney function was poor on admission that could be attributed to your poor heart function or anemia. Once we treated the possible causes your kidney function improved and stablaized.  -You are being discharged home with home hospice, with the goal pf making your comfotable, plan as per home hospice    Diagnosis: CAD (coronary artery disease)  Assessment and Plan of Treatment: Continue your home medication Ranolazine and Metoprolol    Diagnosis: Wound finding  Assessment and Plan of Treatment: Your wounds on your legs were treated with cavilon  and protect with adaptic, jose luis, rachna to secur  -You will be followed by home hospice    Diagnosis: GERD (gastroesophageal reflux disease)  Assessment and Plan of Treatment: Continue your home medication of Prevacid PRINCIPAL DISCHARGE DIAGNOSIS  Diagnosis: Symptomatic anemia  Assessment and Plan of Treatment: -You presented with shortness of breath and anemia. We found signs of blood in your stool. Gastroenterologists evalauted you and determined endoscopy is too high risk for you. You received medications and had no sign of significant active bleeding.   -Take the prescribed pantoprazole.   -You are being discharged home with home hospice, as per you and your family's wishes. Speak to hospice care network if you have any significant symptoms you need addressed in the future.         SECONDARY DISCHARGE DIAGNOSES  Diagnosis: CHF, acute on chronic  Assessment and Plan of Treatment: This is a chronic condition you have that can be a cause of your shortness of breath. You were treated and improved.  -Continue with your medication Lasix, metoprolol    -You are being discharged home with home hospice, as per you and your family's wishes. Speak to hospice care network if you have any significant symptoms you need addressed in the future.    Diagnosis: Advanced COPD  Assessment and Plan of Treatment: -Continue your home inhalers as needed   -Continue your home supplemental oxygen settings   -You are being discharged home with home hospice, as per you and your family's wishes. Speak to hospice care network if you have any significant symptoms you need addressed in the future.    Diagnosis: LANE (acute kidney injury)  Assessment and Plan of Treatment: Your kidney function was poor on admission that could be attributed to your poor heart function or anemia. This improved during the hospitalization.  -You are being discharged home with home hospice, as per you and your family's wishes. Speak to hospice care network if you have any significant symptoms you need addressed in the future.    Diagnosis: CAD (coronary artery disease)  Assessment and Plan of Treatment: Continue your home medication Ranolazine and Metoprolol,    Diagnosis: Wound finding  Assessment and Plan of Treatment: Your wounds on your legs were treated with cavilon  and protect with adaptic, dsd, rachna to secure.  -You will be followed by home hospice    Diagnosis: GERD (gastroesophageal reflux disease)  Assessment and Plan of Treatment: Take the prescribed pantoprazole.

## 2020-08-17 NOTE — PROGRESS NOTE ADULT - PROBLEM SELECTOR PLAN 5
- holding asa for now  - continue home bblockers - Crt 2.0 on admission, baseline 1.5  - Given concern for fluid overload and pts symptoms, lasix given  - Monitor renal function closely

## 2020-08-17 NOTE — CONSULT NOTE ADULT - TREATMENT GUIDELINES
No blood draws/No artificial nutrition/DNR Order/Comfort measures only/Do not re-hospitalize/No IV fluids

## 2020-08-17 NOTE — PROGRESS NOTE ADULT - SUBJECTIVE AND OBJECTIVE BOX
PULMONARY/CRITICAL CARE    Feels much better, less sob, cough.   INTERVAL HPI/OVERNIGHT EVENTS:  Pt with anemia, COPD, c/o sob  89y MaleHPI:  89yo male with hx of COPD on 3L home O2, CAD, scoliosis, glaucoma sent by PMD due to low Hgb level (7.6). Patient had been experiencing decreased exercise tolerance, SOB, fatigue for about 1 month for unknown reason. He underwent work up by cardiologist (ekg, echo reported by daughter) and was told it was normal and did not explain his symptoms. He also followed with his pulmonologist who evaluated him (CXR was done per daughter, showed mild fluid in lining of lungs) but did not think his symptoms were due to his lungs or hx of COPD.  Denies melena, hematochezia, diarrhea, abdominal pain, unintentional weight loss. (15 Aug 2020 13:53)        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, 3L  Pulmonary Htn.  Scoliosis  CAD, multiple vessel  Reflux  H/O inguinal hernia repair        ICU Vital Signs Last 24 Hrs  T(C): 36.3 (16 Aug 2020 07:13), Max: 36.7 (15 Aug 2020 19:05)  T(F): 97.4 (16 Aug 2020 07:13), Max: 98 (15 Aug 2020 19:05)  HR: 99 (16 Aug 2020 07:13) (69 - 99)  BP: 112/75 (16 Aug 2020 07:13) (106/71 - 129/76)  BP(mean): --  ABP: --  ABP(mean): --  RR: 16 (16 Aug 2020 07:13) (16 - 18)  SpO2: 98% (16 Aug 2020 07:13) (91% - 100%)    Qtts:     I&O's Summary    15 Aug 2020 07:01  -  16 Aug 2020 07:00  --------------------------------------------------------  IN: 0 mL / OUT: 500 mL / NET: -500 mL              PHYSICAL EXAM:    GENERAL: elderly male mod sob sitting up in bed  HEAD:  Atraumatic, Normocephalic  EYES: EOMI, PERRLA, conjunctiva and sclera clear  ENMT: No tonsillar erythema, exudates, or enlargement; Moist mucous membranes, Good dentition, No lesions  NECK: Supple, No JVD, Normal thyroid  NERVOUS SYSTEM:  Alert  Motor Strength 5/5 B/L upper and lower extremities  CHEST/LUNG: decreased bs, few bas  rales, good excursion , no  rhonchi, wheezing, or rubs  HEART: Regular rate and rhythm; No murmurs, rubs, or gallops  ABDOMEN: Soft, Nontender, Nondistended; Bowel sounds present  EXTREMITIES:  2+ Peripheral Pulses, No clubbing, cyanosis, 2 plus pedal edema  LYMPH: No lymphadenopathy noted  SKIN: No rashes or lesions        LABS:                        8.6    13.11 )-----------( 293      ( 16 Aug 2020 07:35 )             27.6     08-16    137  |  101  |  52<H>  ----------------------------<  206<H>  4.2   |  26  |  2.10<H>    Ca    8.5      16 Aug 2020 07:35    TPro  6.8  /  Alb  2.9<L>  /  TBili  0.8  /  DBili  x   /  AST  43<H>  /  ALT  33  /  AlkPhos  211<H>  08-16    PT/INR - ( 15 Aug 2020 11:17 )   PT: 14.3 sec;   INR: 1.23 ratio         PTT - ( 15 Aug 2020 11:17 )  PTT:29.2 sec      vanco through     RADIOLOGY & ADDITIONAL STUDIES:    < from: Xray Chest 1 View- PORTABLE-Routine (08.15.20 @ 11:35) >  EXAM:  XR CHEST PORTABLE ROUTINE 1V                            PROCEDURE DATE:  08/15/2020          INTERPRETATION:  CLINICAL INFORMATION: Palpitations, dyspnea on exertion.    A frontal view of the chest was obtained.    Comparison: 2/26/2020.    IMPRESSION:    The mediastinal cardiac silhouette is enlarged.    Elevated right hemidiaphragm unchanged. Small right effusion. Bibasilar atelectasis.    Air distended colon partially imaged.    No acute osseous finding.    < end of copied text >

## 2020-08-17 NOTE — DISCHARGE NOTE PROVIDER - HOSPITAL COURSE
HPI:    89yo male with hx of COPD on 3L home O2, CAD, scoliosis, glaucoma sent by PMD due to low Hgb level (7.6). Patient had been experiencing decreased exercise tolerance, SOB, fatigue for about 1 month for unknown reason. He underwent work up by cardiologist (ekg, echo reported by daughter) and was told it was normal and did not explain his symptoms. He also followed with his pulmonologist who evaluated him (CXR was done per daughter, showed mild fluid in lining of lungs) but did not think his symptoms were due to his lungs or hx of COPD.  Denies melena, hematochezia, diarrhea, abdominal pain, unintentional weight loss. (15 Aug 2020 13:53)            ---    HOSPITAL COURSE:     Patient admitted to for symptomatic anemia and SOB. FOBT was positive. GI was consulted, due to patient's advanced age and multiple comorbidities an endoscopy could not be performed. GI recommended IV PPI and prn transfusions. Pulmonary was consulted for patient's SOB and history of COPD who recommended Symbicort and supplemental O2. Pt remained on 3L NC throughout admission which is his home O2 setting. Cardiology was consulted for acute on chronic CHF and CAD who recommended IV lasix and beta blockers. Patient had an LANE on admission, but Lasix were still used due to concern for fluid overload. Vitamin B12 an Folate levels were normal. Pt was transitioned to oral Lasix as symptoms improved per Cardio. Palliative care was consulted after discussing with family concluded goals of care were comfort care with home hospice planned for discharge.             ---    CONSULTANTS:     GI- Dr Sheikh Carrillo- Dr Araujo    Cardio- Eb     Palliative- Dr Gary Marcano HPI:    89yo male with hx of COPD on 3L home O2, CAD, scoliosis, glaucoma sent by PMD due to low Hgb level (7.6). Patient had been experiencing decreased exercise tolerance, SOB, fatigue for about 1 month for unknown reason. He underwent work up by cardiologist (ekg, echo reported by daughter) and was told it was normal and did not explain his symptoms. He also followed with his pulmonologist who evaluated him (CXR was done per daughter, showed mild fluid in lining of lungs) but did not think his symptoms were due to his lungs or hx of COPD.  Denies melena, hematochezia, diarrhea, abdominal pain, unintentional weight loss. (15 Aug 2020 13:53)            ---    HOSPITAL COURSE:     Patient admitted to for symptomatic anemia and SOB. FOBT was positive. GI was consulted, due to patient's advanced age and multiple comorbidities an endoscopy could not be performed. GI recommended IV PPI and prn transfusions. Pulmonary was consulted for patient's SOB and history of COPD who recommended Symbicort and supplemental O2. Pt remained on 3L NC throughout admission which is his home O2 setting. Cardiology was consulted for acute on chronic CHF and CAD who recommended IV lasix and beta blockers. Patient had an LANE on admission, but Lasix were still used due to concern for fluid overload. Vitamin B12 an Folate levels were normal. Pt was transitioned to oral Lasix as symptoms improved per Cardio. Palliative care was consulted after discussing with family concluded goals of care were comfort care with home hospice planned. Home hospice evaluation performed for discharge.             ---    CONSULTANTS:     GI- Dr Sheikh Carrillo- Dr Araujo    Cardio- Eb     Palliative- Dr Gary Marcano HPI:    89yo male with hx of COPD on 3L home O2, CAD, scoliosis, glaucoma sent by PMD due to low Hgb level (7.6). Patient had been experiencing decreased exercise tolerance, SOB, fatigue for about 1 month for unknown reason. He underwent work up by cardiologist (ekg, echo reported by daughter) and was told it was normal and did not explain his symptoms. He also followed with his pulmonologist who evaluated him (CXR was done per daughter, showed mild fluid in lining of lungs) but did not think his symptoms were due to his lungs or hx of COPD.  Denies melena, hematochezia, diarrhea, abdominal pain, unintentional weight loss. (15 Aug 2020 13:53)            ---    HOSPITAL COURSE:     Patient admitted to for symptomatic anemia and SOB. FOBT was positive. GI was consulted, due to patient's advanced age and multiple comorbidities an endoscopy could not be performed. GI recommended IV PPI and prn transfusions. Pulmonary was consulted for patient's SOB and history of COPD who recommended Symbicort and supplemental O2. Pt remained on 3L NC throughout admission which is his home O2 setting. Cardiology was consulted for acute on chronic CHF and CAD who recommended IV lasix and beta blockers. Patient had an LANE on admission, but Lasix were still used due to concern for fluid overload. Vitamin B12 an Folate levels were normal. Pt was transitioned to oral Lasix as symptoms improved per Cardio. Palliative care was consulted after discussing with family concluded goals of care were, DNR/DNI, comfort care with home hospice.  Home hospice evaluated patient and patient was stable for discharge home with hospice.          ---    CONSULTANTS:     GI- Dr Sheikh Carrillo- Dr Araujo    Cardio- Eb     Palliative- Dr Gary Marcano HPI:    89yo male with hx of COPD on 3L home O2, CAD, scoliosis, glaucoma sent by PMD due to low Hgb level (7.6). Patient had been experiencing decreased exercise tolerance, SOB, fatigue for about 1 month for unknown reason. He underwent work up by cardiologist (ekg, echo reported by daughter) and was told it was normal and did not explain his symptoms. He also followed with his pulmonologist who evaluated him (CXR was done per daughter, showed mild fluid in lining of lungs) but did not think his symptoms were due to his lungs or hx of COPD.  Denies melena, hematochezia, diarrhea, abdominal pain, unintentional weight loss. (15 Aug 2020 13:53)            ---    HOSPITAL COURSE:     Patient admitted to for symptomatic anemia and SOB. FOBT was positive. GI was consulted, due to patient's advanced age and multiple comorbidities an endoscopy could not be performed. GI recommended IV PPI and prn transfusions. Pulmonary was consulted for patient's SOB and history of COPD who recommended Symbicort and supplemental O2. Pt remained on 3L NC throughout admission which is his home O2 setting. Cardiology was consulted for acute on chronic CHF and CAD who recommended IV lasix and beta blockers. Patient had an LANE on admission, but Lasix were still used due to concern for fluid overload. Vitamin B12 an Folate levels were normal. Pt was transitioned to oral Lasix as symptoms improved per Cardio. Palliative care was consulted after discussing with family concluded goals of care were, DNR/DNI, comfort care with home hospice.  Home hospice evaluated patient and patient was stable for discharge home with hospice. Denies SOB, chest pain, dizziness, weakness.        Vital Signs Last 24 Hrs    T(C): 36.6 (19 Aug 2020 07:44), Max: 36.6 (18 Aug 2020 23:49)    T(F): 97.8 (19 Aug 2020 07:44), Max: 97.9 (18 Aug 2020 23:49)    HR: 116 (19 Aug 2020 07:44) (77 - 116)    BP: 97/67 (19 Aug 2020 07:44) (95/61 - 102/71)    BP(mean): --    RR: 22 (19 Aug 2020 07:44) (18 - 22)    SpO2: 93% (19 Aug 2020 07:44) (93% - 100%)        Physical Exam:    GENERAL: elderly, NAD    EYES: sclera clear, no exudates    LUNGS: Poor effort and shallow breathing. Accessory muscle use. Clear to auscultation, no rales, wheezing appreciated    HEART: S1/S2, regular rate and rhythm, no murmurs noted, +bilateral lower extremity edema- chronic, Pedal pulses palpable b/L    GASTROINTESTINAL: abdomen is soft, nontender, nondistended, normoactive bowel sounds, no palpable masses    MUSCULOSKELETAL: no clubbing or cyanosis, no obvious deformity    NEUROLOGIC: awake, alert, oriented x3, no obvious sensory deficits    SKIN: blisters on b/l LE            ---    CONSULTANTS:     GI- Dr Sheikh Carrillo- Dr Araujo    Cardio- [facility]     Palliative- Dr Gary Marcano HPI:    89yo male with hx of COPD on 3L home O2, CAD, scoliosis, glaucoma sent by PMD due to low Hgb level (7.6). Patient had been experiencing decreased exercise tolerance, SOB, fatigue for about 1 month for unknown reason. He underwent work up by cardiologist (ekg, echo reported by daughter) and was told it was normal and did not explain his symptoms. He also followed with his pulmonologist who evaluated him (CXR was done per daughter, showed mild fluid in lining of lungs) but did not think his symptoms were due to his lungs or hx of COPD.  Denies melena, hematochezia, diarrhea, abdominal pain, unintentional weight loss. (15 Aug 2020 13:53)            ---    HOSPITAL COURSE:     Patient admitted to for symptomatic anemia and SOB. FOBT was positive. GI was consulted, due to patient's advanced age and multiple comorbidities an endoscopy could not be performed. GI recommended IV PPI and prn transfusions (pt poor candidate for endoscopies). Pt received 1un PRBC with good response in H&H (Hgb went up from 6.8 to 8.6). Pulmonary was consulted for patient's SOB and history of COPD who recommended Symbicort and supplemental O2. Pt remained on 3L NC throughout admission, which is his home O2 setting given he has chronic hypoxic respiratory failure. Cardiology was consulted for acute on chronic diastolic CHF and CAD who recommended IV lasix and beta blockers. Patient had an LANE on admission, but Lasix was still used due to likely fluid overload. Pt has CKD Stage 4 which was stable despite diuresis. Vitamin B12 and Folate levels were normal. Iron low and pt had some iron load from the PRBC. Will consider po iron as outpt, depending on constipation. Pt was transitioned to oral Lasix as symptoms improved per Cardio. Palliative care was consulted and after discussing with family concluded goals of care were: DNR/DNI, comfort care with home hospice.  Home hospice evaluated patient and patient was stable for discharge home with hospice.         On day of discharge:     ROS: Denies SOB, chest pain, dizziness, weakness.        Vital Signs Last 24 Hrs    T(C): 36.6 (19 Aug 2020 07:44), Max: 36.6 (18 Aug 2020 23:49)    T(F): 97.8 (19 Aug 2020 07:44), Max: 97.9 (18 Aug 2020 23:49)    HR: 116 (19 Aug 2020 07:44) (77 - 116)    BP: 97/67 (19 Aug 2020 07:44) (95/61 - 102/71)    BP(mean): --    RR: 22 (19 Aug 2020 07:44) (18 - 22)    SpO2: 93% (19 Aug 2020 07:44) (93% - 100%)        Physical Exam:    GENERAL: elderly, NAD at rest    EYES: sclera clear, no exudates    LUNGS: Poor effort with shallow breathing. grossly clear to auscultation b/l    HEART: S1, S2, regular rate and rhythm, +bilateral lower extremity edema- chronic     GASTROINTESTINAL: abdomen is soft, nontender, nondistended, normoactive bowel sounds, no palpable masses    MUSCULOSKELETAL: no cyanosis, no calf tenderness     NEUROLOGIC: follows commands and answers questions appropriately     SKIN: blisters on b/l LEs, dressing C/D/I             ---    CONSULTANTS:     GI- Dr Sheikh Carrillo- Dr Araujo    Cardio- [facility]     Palliative- Dr Gary Marcano             Time spent: 40min

## 2020-08-17 NOTE — PROGRESS NOTE ADULT - PROBLEM SELECTOR PLAN 4
- Crt 2.0 on admission, baseline 1.5  - Given concern for fluid overload and pts symptoms, lasix given  - Monitor renal function closely - Cont home meds (budesonide bid, duonebs converted to albuterol tid)  - cont O2 at 3L NC (home med)

## 2020-08-17 NOTE — PROGRESS NOTE ADULT - PROBLEM SELECTOR PLAN 2
- possible acute on chronic  - trop trended down  - lasix 20mg IV in ED,   -Pt has hx of becoming hypotensive with diuresis. will monitor BP  -Monitor I&O, daily weights, BMP, K, cr closely. Replete to keep K>4 and Mag>2  -IV Lasix swithed to po 40mg daily - pos stool occult, but having formed brown stools   - GI (Chanel) following, recs appreciated  -d/w patient and dtr at bedside, multiple significant medical co-morbidities including cad, chf, pulm htn and age would make endoscopic evaluation prohibitive  - Hgb improved. transfuse as needed  - PPI IV  -B12 and Folate wnl

## 2020-08-18 NOTE — PROGRESS NOTE ADULT - PROBLEM SELECTOR PLAN 4
-Cont home meds (budesonide bid, duonebs converted to albuterol tid)  -On 4L NC (3L home setting)  -Dilaudid 0.2mg IVP q4hr prn for SOB

## 2020-08-18 NOTE — PROGRESS NOTE ADULT - SUBJECTIVE AND OBJECTIVE BOX
PULMONARY/CRITICAL CARE  Unchanged  Feels better, less sob, cough.   INTERVAL HPI/OVERNIGHT EVENTS:  Pt with anemia, COPD, c/o sob  89y MaleHPI:  89yo male with hx of COPD on 3L home O2, CAD, scoliosis, glaucoma sent by PMD due to low Hgb level (7.6). Patient had been experiencing decreased exercise tolerance, SOB, fatigue for about 1 month for unknown reason. He underwent work up by cardiologist (ekg, echo reported by daughter) and was told it was normal and did not explain his symptoms. He also followed with his pulmonologist who evaluated him (CXR was done per daughter, showed mild fluid in lining of lungs) but did not think his symptoms were due to his lungs or hx of COPD.  Denies melena, hematochezia, diarrhea, abdominal pain, unintentional weight loss. (15 Aug 2020 13:53)        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, 3L  Pulmonary Htn.  Scoliosis  CAD, multiple vessel  Reflux  H/O inguinal hernia repair        ICU Vital Signs Last 24 Hrs  T(C): 36.3 (16 Aug 2020 07:13), Max: 36.7 (15 Aug 2020 19:05)  T(F): 97.4 (16 Aug 2020 07:13), Max: 98 (15 Aug 2020 19:05)  HR: 99 (16 Aug 2020 07:13) (69 - 99)  BP: 112/75 (16 Aug 2020 07:13) (106/71 - 129/76)  BP(mean): --  ABP: --  ABP(mean): --  RR: 16 (16 Aug 2020 07:13) (16 - 18)  SpO2: 98% (16 Aug 2020 07:13) (91% - 100%)    Qtts:     I&O's Summary    15 Aug 2020 07:01  -  16 Aug 2020 07:00  --------------------------------------------------------  IN: 0 mL / OUT: 500 mL / NET: -500 mL              PHYSICAL EXAM:    GENERAL: elderly male  sitting up in bed  HEAD:  Atraumatic, Normocephalic  EYES: EOMI, PERRLA, conjunctiva and sclera clear  ENMT: No tonsillar erythema, exudates, or enlargement; Moist mucous membranes, Good dentition, No lesions  NECK: Supple, No JVD, Normal thyroid  NERVOUS SYSTEM:  Alert confused Motor Strength 5/5 B/L upper and lower extremities  CHEST/LUNG: decreased bs, few bas  rales, good excursion , no  rhonchi, wheezing, or rubs  HEART: Regular rate and rhythm; No murmurs, rubs, or gallops  ABDOMEN: Soft, Nontender, Nondistended; Bowel sounds present  EXTREMITIES:  2+ Peripheral Pulses, No clubbing, cyanosis, 2 plus pedal edema  LYMPH: No lymphadenopathy noted  SKIN: No rashes or lesions        LABS:                        8.6    13.11 )-----------( 293      ( 16 Aug 2020 07:35 )             27.6     08-16    137  |  101  |  52<H>  ----------------------------<  206<H>  4.2   |  26  |  2.10<H>    Ca    8.5      16 Aug 2020 07:35    TPro  6.8  /  Alb  2.9<L>  /  TBili  0.8  /  DBili  x   /  AST  43<H>  /  ALT  33  /  AlkPhos  211<H>  08-16    PT/INR - ( 15 Aug 2020 11:17 )   PT: 14.3 sec;   INR: 1.23 ratio         PTT - ( 15 Aug 2020 11:17 )  PTT:29.2 sec      vanco through     RADIOLOGY & ADDITIONAL STUDIES:    < from: Xray Chest 1 View- PORTABLE-Routine (08.15.20 @ 11:35) >  EXAM:  XR CHEST PORTABLE ROUTINE 1V                            PROCEDURE DATE:  08/15/2020          INTERPRETATION:  CLINICAL INFORMATION: Palpitations, dyspnea on exertion.    A frontal view of the chest was obtained.    Comparison: 2/26/2020.    IMPRESSION:    The mediastinal cardiac silhouette is enlarged.    Elevated right hemidiaphragm unchanged. Small right effusion. Bibasilar atelectasis.    Air distended colon partially imaged.    No acute osseous finding.    < end of copied text >

## 2020-08-18 NOTE — PROGRESS NOTE ADULT - PROBLEM SELECTOR PLAN 7
PT with hx of chronic b/l le edema (10 years) p/w B/l lower extremity wounds likely 2/2 chronic venous stasis.  -Wound care consulted for new wound on LLE. f/u recs PT with hx of chronic b/l le edema (10 years) p/w B/l lower extremity wounds likely 2/2 chronic venous stasis.  -Wound care consulted: Paint with cavilon  and protect with adaptic, dsd, rachna to secure. RN educated in the care of this patients blister care

## 2020-08-18 NOTE — PROGRESS NOTE ADULT - PROBLEM SELECTOR PLAN 2
- pos stool occult, but having formed brown stools   - GI (Chanel) following, recs appreciated  -d/w patient and dtr at bedside, multiple significant medical co-morbidities including cad, chf, pulm htn and age would make endoscopic evaluation prohibitive  -Hgb improved. transfuse as needed  -PPI IV  -B12 and Folate wnl  -Dilaudid 0.2mg IVP q4hr prn for SOB  -No labs

## 2020-08-18 NOTE — PROGRESS NOTE ADULT - ASSESSMENT
89yo male  with hx of CAD, COPD on 3L O2 via NC at home,sent by PMD due to anemia in setting of fatigue and Decreased exercise tolerance. Admitted for Sob likely due to symptomatic anemia vs heart failure/fluid overload. Ddx includes congestive heart  failure, worsening COPD.     1) Decreased PO intake   - feeding as tolerated  - no feeding tubes or IVF  - Trial of marinol as appetite stimulant if desired by pt/HCP    2) Dyspnea - multifactorial 2/2 advanced COPD, CHF, severe pulm HTN, severe anemia  - improved  - continue fentanyl patch 12 mcg q72h  - dilaudid 1 mg PO q4h prn  - O2 via NC as per primary team  - constipation prophylaxis     3) Failure to thrive  4) Goals of care/advance care planning  - Palliative performance scale score: 20% (poor)  - Prognosis: days-weeks (pt is hospice eligible)   - Code status: DNR/DNI ([facility] form filled out and placed in the chart)  - GOC: trial of IV antibiotics and IVF. Home hospice upon discharge.   - HCP: Kiya Gilbert (dtrs) (form placed in chart)  - Psychosocial support provided  - SW to start referral to home hospice    Discussed with the primary team. Will continue to follow    Edvin Baum MD

## 2020-08-18 NOTE — PROGRESS NOTE ADULT - PROBLEM SELECTOR PLAN 3
- possible acute on chronic  -Trop trended down  -Pt has hx of becoming hypotensive with diuresis. will monitor BP  -Monitor I&O, daily weights, BMP, K, cr closely. Replete to keep K>4 and Mag>2  -Lasix 40mg po daily  -No labs

## 2020-08-18 NOTE — ADVANCED PRACTICE NURSE CONSULT - ASSESSMENT
Patient is a 89yo male admitted with anemia: PMHX: COPD with hypoxia, scoliosis, CAD, reflux, hospital course further complicated with GERD, CAD, LANE, acute on chronic CHF: Presents with Edema at bilateral lower extremities Right +1 left,  +3, positive pulses, cap refill ,< 3 sec,: Right anterior gaiter 1x 1.5 dried bullae periwound intact no erythema present: left anterior gaiter serous filled bullae about 3 x 2 periwound intact: Likely etiology of these bullae is lower extremity Edema 2/2 CHF exacerbation

## 2020-08-18 NOTE — PROGRESS NOTE ADULT - PROBLEM SELECTOR PLAN 5
-Crt 2.0 on admission, baseline 1.5  -Given concern for fluid overload and pts symptoms, lasix given  -No labs

## 2020-08-18 NOTE — PROGRESS NOTE ADULT - SUBJECTIVE AND OBJECTIVE BOX
SUBJECTIVE AND OBJECTIVE:  INTERVAL HPI/OVERNIGHT EVENTS:    DNR on chart: Yes    Allergies    Doxycycline Hyclate (Unknown)  penicillin (Anaphylaxis)    Intolerances    MEDICATIONS  (STANDING):  ALBUTerol    90 MICROgram(s) HFA Inhaler 2 Puff(s) Inhalation three times a day  budesonide 160 MICROgram(s)/formoterol 4.5 MICROgram(s) Inhaler 2 Puff(s) Inhalation two times a day  furosemide    Tablet 40 milliGRAM(s) Oral daily  latanoprost 0.005% Ophthalmic Solution 1 Drop(s) Both EYES at bedtime  metoprolol tartrate 12.5 milliGRAM(s) Oral two times a day  pantoprazole  Injectable 40 milliGRAM(s) IV Push daily  ranolazine 500 milliGRAM(s) Oral two times a day  simbrinza 1 Drop(s) 1 Drop(s) Right EYE two times a day  tiotropium 18 MICROgram(s) Capsule 1 Capsule(s) Inhalation daily    MEDICATIONS  (PRN):  HYDROmorphone  Injectable 0.2 milliGRAM(s) IV Push every 4 hours PRN shortness of breath      ITEMS UNCHECKED ARE NOT PRESENT    PRESENT SYMPTOMS: [ ]Unable to obtain due to poor mentation   Source if other than patient:  [ ]Family   [ ]Team     Pain:  [ ]yes [ ]no  QOL impact -   Location -                    Aggravating factors -  Quality -  Radiation -  Timing-  Severity (0-10 scale):  Minimal acceptable level (0-10 scale):     Dyspnea:                           [ ]Mild [ ]Moderate [ ]Severe  Anxiety:                             [ ]Mild [ ]Moderate [ ]Severe  Fatigue:                             [ ]Mild [ ]Moderate [ ]Severe  Nausea:                             [ ]Mild [ ]Moderate [ ]Severe  Loss of appetite:              [ ]Mild [ ]Moderate [ ]Severe  Constipation:                    [ ]Mild [ ]Moderate [ ]Severe    CPOT:    https://www.[facility].org/getattachment/[email or web address]/Critical-Care-Pain-Observation-Tool-(CPOT)    PAIN AD Score:	  http://geriatrictoolkit.[place].[facility]/cog/painad.pdf (Ctrl + left click to view)    Other Symptoms:  [ ]All other review of systems negative     Palliative Performance Status Version 2:         %      http://npcrc.org/files/news/palliative_performance_scale_ppsv2.pdf  PHYSICAL EXAM:  Vital Signs Last 24 Hrs  T(C): 36.5 (18 Aug 2020 12:04), Max: 36.8 (18 Aug 2020 04:10)  T(F): 97.7 (18 Aug 2020 12:04), Max: 98.3 (18 Aug 2020 04:10)  HR: 83 (18 Aug 2020 12:04) (83 - 108)  BP: 105/67 (18 Aug 2020 12:04) (97/66 - 111/76)  BP(mean): --  RR: 18 (18 Aug 2020 12:04) (18 - 20)  SpO2: 99% (18 Aug 2020 12:04) (92% - 100%) I&O's Summary    17 Aug 2020 07:01  -  18 Aug 2020 07:00  --------------------------------------------------------  IN: 0 mL / OUT: 1 mL / NET: -1 mL       GENERAL:  [ ]Alert  [ ]Oriented x   [ ]Lethargic  [ ]Cachexia  [ ]Unarousable  [ ]Verbal  [ ]Non-Verbal  Behavioral:   [ ]Anxiety  [ ]Delirium [ ]Agitation [ ]Other  HEENT:  [ ]Normal   [ ]Dry mouth   [ ]ET Tube/Trach  [ ]Oral lesions  PULMONARY:   [ ]Clear [ ]Tachypnea  [ ]Audible excessive secretions   [ ]Rhonchi        [ ]Right [ ]Left [ ]Bilateral  [ ]Crackles        [ ]Right [ ]Left [ ]Bilateral  [ ]Wheezing     [ ]Right [ ]Left [ ]Bilateral  [ ]Diminished BS [ ] Right [ ]Left [ ]Bilateral  CARDIOVASCULAR:    [ ]Regular [ ]Irregular [ ]Tachy  [ ]Kei [ ]Murmur [ ]Other  GASTROINTESTINAL:  [ ]Soft  [ ]Distended   [ ]+BS  [ ]Non tender [ ]Tender  [ ]PEG [ ]OGT/ NGT   Last BM:   08-17-20 @ 07:01  -  08-18-20 @ 07:00  --------------------------------------------------------  OUT: 1 mL       GENITOURINARY:  [ ]Normal [ ]Incontinent   [ ]Oliguria/Anuria   [ ]Georges  MUSCULOSKELETAL:   [ ]Normal   [ ]Weakness  [ ]Bed/Wheelchair bound [ ]Edema  NEUROLOGIC:   [ ]No focal deficits  [ ] Cognitive impairment  [ ] Dysphagia [ ]Dysarthria [ ] Paresis [ ]Other   SKIN:   [ ]Normal  [ ]Rash   [ ]Pressure ulcer(s) [ ]y [ ]n present on admission    CRITICAL CARE:  [ ]Shock Present  [ ]Septic [ ]Cardiogenic [ ]Neurologic [ ]Hypovolemic  [ ]Vasopressors [ ]Inotropes  [ ]Respiratory failure present [ ]Mechanical Ventilation [ ]Non-invasive ventilatory support [ ]High-Flow   [ ]Acute  [ ]Chronic [ ]Hypoxic  [ ]Hypercarbic [ ]Other  [ ]Other organ failure     LABS:                        8.2    13.60 )-----------( 252      ( 17 Aug 2020 07:27 )             26.3   08-17    138  |  102  |  55<H>  ----------------------------<  118<H>  4.1   |  30  |  2.00<H>    Ca    8.5      17 Aug 2020 07:27  Phos  3.9     08-17  Mg     2.2     08-17          RADIOLOGY & ADDITIONAL STUDIES:    Protein Calorie Malnutrition Present: [ ]mild [ ]moderate [ ]severe [ ]underweight [ ]morbid obesity  https://www.andeal.org/vault/2440/web/files/ONC/Table_Clinical%20Characteristics%20to%20Document%20Malnutrition-White%20JV%20et%20al%896655.pdf    Height (cm): 162.56 (08-15-20 @ 10:36)  Weight (kg): 70.3 (08-15-20 @ 10:36), 72.6 (02-26-20 @ 14:01)  BMI (kg/m2): 26.6 (08-15-20 @ 10:36)    [ ]PPSV2 < or = 30%  [ ]significant weight loss [ ]poor nutritional intake [ ]anasarca   Albumin, Serum: 2.9 g/dL (08-16-20 @ 07:35)   [ ]Artificial Nutrition    REFERRALS:   [ ]Chaplaincy  [ ]Hospice  [ ]Child Life  [ ]Social Work  [ ]Case management [ ]Holistic Therapy     Goals of Care Document: SUBJECTIVE AND OBJECTIVE/INTERVAL HPI/OVERNIGHT EVENTS:  - was SOB overnight due to hypoxia. O2 titrated to 4L and given morphine x1 with improvement,  - This AM pt stated he feels less SOB and was sitting comfortably and joking with staff and writer. Denied pain, N/V, constipation, fever or anxiety. Looking forward to going home.     DNR on chart: Yes    Allergies    Doxycycline Hyclate (Unknown)  penicillin (Anaphylaxis)    Intolerances    MEDICATIONS  (STANDING):  ALBUTerol    90 MICROgram(s) HFA Inhaler 2 Puff(s) Inhalation three times a day  budesonide 160 MICROgram(s)/formoterol 4.5 MICROgram(s) Inhaler 2 Puff(s) Inhalation two times a day  furosemide    Tablet 40 milliGRAM(s) Oral daily  latanoprost 0.005% Ophthalmic Solution 1 Drop(s) Both EYES at bedtime  metoprolol tartrate 12.5 milliGRAM(s) Oral two times a day  pantoprazole  Injectable 40 milliGRAM(s) IV Push daily  ranolazine 500 milliGRAM(s) Oral two times a day  simbrinza 1 Drop(s) 1 Drop(s) Right EYE two times a day  tiotropium 18 MICROgram(s) Capsule 1 Capsule(s) Inhalation daily    MEDICATIONS  (PRN):  HYDROmorphone  Injectable 0.2 milliGRAM(s) IV Push every 4 hours PRN shortness of breath      ITEMS UNCHECKED ARE NOT PRESENT    PRESENT SYMPTOMS: [ ]Unable to obtain due to poor mentation   Source if other than patient:  [ ]Family   [ ]Team     Pain:  [ ]yes [x ]no  QOL impact -   Location -                    Aggravating factors -  Quality -  Radiation -  Timing-  Severity (0-10 scale):  Minimal acceptable level (0-10 scale):     Dyspnea:                           [x ]Mild [ ]Moderate [ ]Severe  Anxiety:                             [ ]Mild [ ]Moderate [ ]Severe  Fatigue:                             [ ]Mild [ ]Moderate [ ]Severe  Nausea:                             [ ]Mild [ ]Moderate [ ]Severe  Loss of appetite:              [ ]Mild [ ]Moderate [ ]Severe  Constipation:                    [ ]Mild [ ]Moderate [ ]Severe    CPOT:    https://www.[facility].org/getattachment/[email or web address]/Critical-Care-Pain-Observation-Tool-(CPOT)    PAIN AD Score:	  http://geriatrictoolkit.[facility]/cog/painad.pdf (Ctrl + left click to view)    Other Symptoms:  [x ]All other review of systems negative     Palliative Performance Status Version 2:        40 %      http://[facility].org/files/news/palliative_performance_scale_ppsv2.pdf  PHYSICAL EXAM:  Vital Signs Last 24 Hrs  T(C): 36.5 (18 Aug 2020 12:04), Max: 36.8 (18 Aug 2020 04:10)  T(F): 97.7 (18 Aug 2020 12:04), Max: 98.3 (18 Aug 2020 04:10)  HR: 83 (18 Aug 2020 12:04) (83 - 108)  BP: 105/67 (18 Aug 2020 12:04) (97/66 - 111/76)  BP(mean): --  RR: 18 (18 Aug 2020 12:04) (18 - 20)  SpO2: 99% (18 Aug 2020 12:04) (92% - 100%) I&O's Summary    17 Aug 2020 07:01  -  18 Aug 2020 07:00  --------------------------------------------------------  IN: 0 mL / OUT: 1 mL / NET: -1 mL    GENERAL: elderly, mild distress 2/2 SOB, appropriate for age, inclined in bed, RR 36  EYES: EOMI, PERRL, sclera clear, no exudates  LUNGS: Poor effort and shallow breathing. Clear to auscultation, no rales, wheezing appreciated  HEART: S1/S2, regular rate and rhythm, no murmurs noted, +bilateral lower extremity edema- chronic, Pedal pulses palpable b/L  GASTROINTESTINAL: abdomen is soft, nontender, nondistended, normoactive bowel sounds, no palpable masses  INTEGUMENT: good skin turgor, warm, dry and intact  MUSCULOSKELETAL: no clubbing or cyanosis, no obvious deformity  NEUROLOGIC: awake, alert, oriented x3, no obvious sensory deficits  SKIN: blisters on b/l LE. New small weeping wound on LLE     LABS:                        8.2    13.60 )-----------( 252      ( 17 Aug 2020 07:27 )             26.3   08-17    138  |  102  |  55<H>  ----------------------------<  118<H>  4.1   |  30  |  2.00<H>    Ca    8.5      17 Aug 2020 07:27  Phos  3.9     08-17  Mg     2.2     08-17          RADIOLOGY & ADDITIONAL STUDIES: reviewed    Protein Calorie Malnutrition Present: [ ]mild [ ]moderate [ ]severe [ ]underweight [ ]morbid obesity  https://www.andeal.org/vault/2440/web/files/ONC/Table_Clinical%20Characteristics%20to%20Document%20Malnutrition-White%20JV%20et%20al%202012.pdf    Height (cm): 162.56 (08-15-20 @ 10:36)  Weight (kg): 70.3 (08-15-20 @ 10:36), 72.6 (02-26-20 @ 14:01)  BMI (kg/m2): 26.6 (08-15-20 @ 10:36)    [ ]PPSV2 < or = 30%  [ ]significant weight loss [x ]poor nutritional intake [ ]anasarca   Albumin, Serum: 2.9 g/dL (08-16-20 @ 07:35)   [ ]Artificial Nutrition    REFERRALS:   [ ]Chaplaincy  [ x]Hospice  [ ]Child Life  [ ]Social Work  [ ]Case management [ ]Holistic Therapy     Goals of Care Document:

## 2020-08-18 NOTE — ADVANCED PRACTICE NURSE CONSULT - RECOMMEDATIONS
Paint with cavilon  and protect with adaptic, dsd, rachna to secure  RN educated in the care of this patients blister care  MD made aware of recommendation  orders written

## 2020-08-18 NOTE — PROGRESS NOTE ADULT - PROBLEM SELECTOR PLAN 1
Palliative consult Dr Gary Marcano:   -Comfort care now  -DNR/DNI order placed  -No RR  -No vitals  -No labs  -Fentanyl patch 12mcg every 72hrs  -Dilaudid 0.2mg IVP q4hr prn for SOB  -DC atorvastatin after palliative consult. Pill too large and burdensome  -Waiting on home hospice eval

## 2020-08-18 NOTE — PROGRESS NOTE ADULT - PROBLEM SELECTOR PLAN 10
DVT prophylaxis help for possible GI bleed. Pt on comfort measures currently     IMPROVE VTE Individual Risk Assessment  RISK                                                                Points    [  ] Previous VTE                                                  3  [  ] Thrombophilia                                               2  [  ] Lower limb paralysis                                      2        (unable to hold up >15 seconds)    [  ] Current Cancer                                              2         (within 6 months)  [  ] Immobilization > 24 hrs                                1  [  ] ICU/CCU stay > 24 hours                              1  [ X ] Age > 60                                                      1  IMPROVE VTE Score _________
IMPROVE VTE Individual Risk Assessment    RISK                                                                Points    [  ] Previous VTE                                                  3  [  ] Thrombophilia                                               2  [  ] Lower limb paralysis                                      2        (unable to hold up >15 seconds)    [  ] Current Cancer                                              2         (within 6 months)  [  ] Immobilization > 24 hrs                                1  [  ] ICU/CCU stay > 24 hours                              1  [ X ] Age > 60                                                      1  IMPROVE VTE Score _________  IMPROVE Score 0-1: Low Risk, No VTE prophylaxis required for most patients, encourage ambulation.   IMPROVE Score 2-3: At risk, pharmacologic VTE prophylaxis is indicated for most patients (in the absence of a contraindication)  IMPROVE Score > or = 4: High Risk, pharmacologic VTE prophylaxis is indicated for most patients (in the absence of a contraindication)

## 2020-08-18 NOTE — PROGRESS NOTE ADULT - SUBJECTIVE AND OBJECTIVE BOX
INTERVAL HPI/OVERNIGHT EVENTS:  pt seen and examined  denies n/v/abd pain      MEDICATIONS  (STANDING):  ALBUTerol    90 MICROgram(s) HFA Inhaler 2 Puff(s) Inhalation three times a day  atorvastatin 40 milliGRAM(s) Oral at bedtime  budesonide 160 MICROgram(s)/formoterol 4.5 MICROgram(s) Inhaler 2 Puff(s) Inhalation two times a day  furosemide   Injectable 40 milliGRAM(s) IV Push daily  latanoprost 0.005% Ophthalmic Solution 1 Drop(s) Both EYES at bedtime  metoprolol tartrate 12.5 milliGRAM(s) Oral two times a day  pantoprazole  Injectable 40 milliGRAM(s) IV Push daily  ranolazine 500 milliGRAM(s) Oral two times a day  simbrinza 1 Drop(s) 1 Drop(s) Right EYE two times a day  tiotropium 18 MICROgram(s) Capsule 1 Capsule(s) Inhalation daily    MEDICATIONS  (PRN):      Allergies    Doxycycline Hyclate (Unknown)  penicillin (Anaphylaxis)    Intolerances        Review of Systems:    General:  No wt loss, fevers, chills, night sweats, fatigue   Eyes:  Good vision, no reported pain  ENT:  No sore throat, pain, runny nose, dysphagia  CV:  no cp palps  Resp:  No dyspnea, cough, tachypnea, wheezing  GI:  No pain, No nausea, No vomiting, No diarrhea, No constipation, No weight loss, No fever, No pruritis, No rectal bleeding, No melena, No dysphagia  :  No pain, bleeding, incontinence, nocturia  Muscle:  No pain, weakness  Neuro:  No weakness, tingling, memory problems  Psych:  No fatigue, insomnia, mood problems, depression  Endocrine:  No polyuria, polydypsia, cold/heat intolerance  Heme:  No petechiae, ecchymosis, easy bruisability  Skin:  No rash, tattoos, scars, edema      Vital Signs Last 24 Hrs  T(C): 36.3 (16 Aug 2020 07:13), Max: 36.7 (15 Aug 2020 19:05)  T(F): 97.4 (16 Aug 2020 07:13), Max: 98 (15 Aug 2020 19:05)  HR: 99 (16 Aug 2020 07:13) (69 - 99)  BP: 112/75 (16 Aug 2020 07:13) (106/71 - 129/76)  BP(mean): --  RR: 16 (16 Aug 2020 07:13) (16 - 18)  SpO2: 98% (16 Aug 2020 07:13) (91% - 100%)    PHYSICAL EXAM:    Constitutional: lying in bed frail  HEENT: ncat  Neck: No LAD  Gastrointestinal: soft nt mild dt  Extremities: No peripheral edema  Vascular: + peripheral pulses  Neurological: Awake alert appropriate      LABS:                        8.6    13.11 )-----------( 293      ( 16 Aug 2020 07:35 )             27.6     08-16    137  |  101  |  52<H>  ----------------------------<  206<H>  4.2   |  26  |  2.10<H>    Ca    8.5      16 Aug 2020 07:35    TPro  6.8  /  Alb  2.9<L>  /  TBili  0.8  /  DBili  x   /  AST  43<H>  /  ALT  33  /  AlkPhos  211<H>  08-16    PT/INR - ( 15 Aug 2020 11:17 )   PT: 14.3 sec;   INR: 1.23 ratio         PTT - ( 15 Aug 2020 11:17 )  PTT:29.2 sec      RADIOLOGY & ADDITIONAL TESTS:

## 2020-08-18 NOTE — PROGRESS NOTE ADULT - SUBJECTIVE AND OBJECTIVE BOX
Patient is a 89y old  Male who presents with a chief complaint of Anemia, sent by PMD (18 Aug 2020 08:40)      INTERVAL HPI/OVERNIGHT EVENTS: Patient seen and examined at bedside. NC increased to 4L last night for desat to 82%, now 90s. Pt eating breakfast tolerating well. Awaiting home hospice eval for discharge. Denies fevers, chills, headache, lightheadedness, chest pain, abdominal pain, n/v/d/c.      MEDICATIONS  (STANDING):  ALBUTerol    90 MICROgram(s) HFA Inhaler 2 Puff(s) Inhalation three times a day  budesonide 160 MICROgram(s)/formoterol 4.5 MICROgram(s) Inhaler 2 Puff(s) Inhalation two times a day  furosemide    Tablet 40 milliGRAM(s) Oral daily  latanoprost 0.005% Ophthalmic Solution 1 Drop(s) Both EYES at bedtime  metoprolol tartrate 12.5 milliGRAM(s) Oral two times a day  pantoprazole  Injectable 40 milliGRAM(s) IV Push daily  ranolazine 500 milliGRAM(s) Oral two times a day  simbrinza 1 Drop(s) 1 Drop(s) Right EYE two times a day  tiotropium 18 MICROgram(s) Capsule 1 Capsule(s) Inhalation daily    MEDICATIONS  (PRN):  HYDROmorphone  Injectable 0.2 milliGRAM(s) IV Push every 4 hours PRN shortness of breath      Allergies    Doxycycline Hyclate (Unknown)  penicillin (Anaphylaxis)    Intolerances        REVIEW OF SYSTEMS:  CONSTITUTIONAL: No fever or chills  HEENT:  No headache, no sore throat  RESPIRATORY: Positive chronic SOB. No cough, wheezing  CARDIOVASCULAR: No chest pain, palpitations  GASTROINTESTINAL: No abd pain, nausea, vomiting, or diarrhea  GENITOURINARY: No dysuria, frequency, or hematuria  NEUROLOGICAL: no focal weakness or dizziness  MUSCULOSKELETAL: no myalgias     Vital Signs Last 24 Hrs  T(C): 36.5 (18 Aug 2020 12:04), Max: 36.8 (18 Aug 2020 04:10)  T(F): 97.7 (18 Aug 2020 12:04), Max: 98.3 (18 Aug 2020 04:10)  HR: 83 (18 Aug 2020 12:04) (83 - 108)  BP: 105/67 (18 Aug 2020 12:04) (97/66 - 111/76)  BP(mean): --  RR: 18 (18 Aug 2020 12:04) (18 - 20)  SpO2: 99% (18 Aug 2020 12:04) (92% - 100%)    PHYSICAL EXAM:  GENERAL: elderly, NAD  EYES: sclera clear, no exudates  LUNGS: Poor effort and shallow breathing. Accessory muscle use. Clear to auscultation, no rales, wheezing appreciated  HEART: S1/S2, regular rate and rhythm, no murmurs noted, +bilateral lower extremity edema- chronic, Pedal pulses palpable b/L  GASTROINTESTINAL: abdomen is soft, nontender, nondistended, normoactive bowel sounds, no palpable masses  INTEGUMENT: good skin turgor, warm, dry and intact  MUSCULOSKELETAL: no clubbing or cyanosis, no obvious deformity  NEUROLOGIC: awake, alert, oriented x3, no obvious sensory deficits  SKIN: blisters on b/l LE    LABS:    CBC Full  -  ( 17 Aug 2020 07:27 )  WBC Count : 13.60 K/uL  Hemoglobin : 8.2 g/dL  Hematocrit : 26.3 %  Platelet Count - Automated : 252 K/uL  Mean Cell Volume : 84.0 fl  Mean Cell Hemoglobin : 26.2 pg  Mean Cell Hemoglobin Concentration : 31.2 gm/dL  Auto Neutrophil # : x  Auto Lymphocyte # : x  Auto Monocyte # : x  Auto Eosinophil # : x  Auto Basophil # : x  Auto Neutrophil % : x  Auto Lymphocyte % : x  Auto Monocyte % : x  Auto Eosinophil % : x  Auto Basophil % : x      Ca    8.5        17 Aug 2020 07:27          CAPILLARY BLOOD GLUCOSE              RADIOLOGY & ADDITIONAL TESTS:    Personally reviewed.     Consultant(s) Notes Reviewed:  [x] YES  [ ] NO

## 2020-08-19 NOTE — PROGRESS NOTE ADULT - SUBJECTIVE AND OBJECTIVE BOX
INTERVAL HPI/OVERNIGHT EVENTS:  pt seen and examined  offers no gi complaints  no gib per nursing      MEDICATIONS  (STANDING):  ALBUTerol    90 MICROgram(s) HFA Inhaler 2 Puff(s) Inhalation three times a day  atorvastatin 40 milliGRAM(s) Oral at bedtime  budesonide 160 MICROgram(s)/formoterol 4.5 MICROgram(s) Inhaler 2 Puff(s) Inhalation two times a day  furosemide   Injectable 40 milliGRAM(s) IV Push daily  latanoprost 0.005% Ophthalmic Solution 1 Drop(s) Both EYES at bedtime  metoprolol tartrate 12.5 milliGRAM(s) Oral two times a day  pantoprazole  Injectable 40 milliGRAM(s) IV Push daily  ranolazine 500 milliGRAM(s) Oral two times a day  simbrinza 1 Drop(s) 1 Drop(s) Right EYE two times a day  tiotropium 18 MICROgram(s) Capsule 1 Capsule(s) Inhalation daily    MEDICATIONS  (PRN):      Allergies    Doxycycline Hyclate (Unknown)  penicillin (Anaphylaxis)    Intolerances        Review of Systems:    General:  No wt loss, fevers, chills, night sweats, fatigue   Eyes:  Good vision, no reported pain  ENT:  No sore throat, pain, runny nose, dysphagia  CV:  no cp palps  Resp:  No dyspnea, cough, tachypnea, wheezing  GI:  No pain, No nausea, No vomiting, No diarrhea, No constipation, No weight loss, No fever, No pruritis, No rectal bleeding, No melena, No dysphagia  :  No pain, bleeding, incontinence, nocturia  Muscle:  No pain, weakness  Neuro:  No weakness, tingling, memory problems  Psych:  No fatigue, insomnia, mood problems, depression  Endocrine:  No polyuria, polydypsia, cold/heat intolerance  Heme:  No petechiae, ecchymosis, easy bruisability  Skin:  No rash, tattoos, scars, edema      Vital Signs Last 24 Hrs  T(C): 36.3 (16 Aug 2020 07:13), Max: 36.7 (15 Aug 2020 19:05)  T(F): 97.4 (16 Aug 2020 07:13), Max: 98 (15 Aug 2020 19:05)  HR: 99 (16 Aug 2020 07:13) (69 - 99)  BP: 112/75 (16 Aug 2020 07:13) (106/71 - 129/76)  BP(mean): --  RR: 16 (16 Aug 2020 07:13) (16 - 18)  SpO2: 98% (16 Aug 2020 07:13) (91% - 100%)    PHYSICAL EXAM:    Constitutional: lying in bed frail  HEENT: ncat  Neck: No LAD  Gastrointestinal: soft nt nd  Extremities: No peripheral edema  Vascular: + peripheral pulses  Neurological: Awake alert appropriate      LABS:                        8.6    13.11 )-----------( 293      ( 16 Aug 2020 07:35 )             27.6     08-16    137  |  101  |  52<H>  ----------------------------<  206<H>  4.2   |  26  |  2.10<H>    Ca    8.5      16 Aug 2020 07:35    TPro  6.8  /  Alb  2.9<L>  /  TBili  0.8  /  DBili  x   /  AST  43<H>  /  ALT  33  /  AlkPhos  211<H>  08-16    PT/INR - ( 15 Aug 2020 11:17 )   PT: 14.3 sec;   INR: 1.23 ratio         PTT - ( 15 Aug 2020 11:17 )  PTT:29.2 sec      RADIOLOGY & ADDITIONAL TESTS:

## 2020-08-19 NOTE — PROGRESS NOTE ADULT - PROBLEM SELECTOR PLAN 1
89yo male  with hx of CAD, COPD sent by PMD due to anemia in setting of fatigue and Decreased exercise tolerance. Admitted for Sob likely due to symptomatic anemia vs heart failure/fluid overload. Ddx includes congestive heart  failure, worsening COPD.   planned for Home Hospice - C reviewed  palliative follow up reviewed  on Opioids for comfort and sx management  remains on Inhaler regimen - for COPD - use as tolerated and able to coordinate Inhaler Management - otherwise - if symptomatic may use Opioids for sx of Dyspnea

## 2020-08-19 NOTE — PROGRESS NOTE ADULT - PROBLEM SELECTOR PLAN 1
hgb improved sp transfusion  no melena or hematochezia  pt on comfort measures  pain control  diet as tolerated  pall care following, planned for home hospice  will sign off please re call if needed

## 2020-08-19 NOTE — PROGRESS NOTE ADULT - PROVIDER SPECIALTY LIST ADULT
Cardiology
Gastroenterology
Hospitalist
Hospitalist
Palliative Care
Pulmonology
Cardiology
Hospitalist
Gastroenterology

## 2020-08-19 NOTE — DISCHARGE NOTE NURSING/CASE MANAGEMENT/SOCIAL WORK - PATIENT PORTAL LINK FT
You can access the FollowMyHealth Patient Portal offered by [facility] by registering at the following website: [email or web address]. By joining [facility]’s FollowMyHealth portal, you will also be able to view your health information using other applications (apps) compatible with our system.

## 2020-08-19 NOTE — PROGRESS NOTE ADULT - REASON FOR ADMISSION
Anemia, sent by PMD

## 2020-08-19 NOTE — PROGRESS NOTE ADULT - SUBJECTIVE AND OBJECTIVE BOX
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, 3L  Scoliosis  CAD, multiple vessel  High blood pressure  Reflux  High cholesterol  H/O inguinal hernia repair        Medication list         MEDICATIONS  (STANDING):  ALBUTerol    90 MICROgram(s) HFA Inhaler 2 Puff(s) Inhalation three times a day  budesonide 160 MICROgram(s)/formoterol 4.5 MICROgram(s) Inhaler 2 Puff(s) Inhalation two times a day  furosemide    Tablet 40 milliGRAM(s) Oral daily  latanoprost 0.005% Ophthalmic Solution 1 Drop(s) Both EYES at bedtime  metoprolol tartrate 12.5 milliGRAM(s) Oral two times a day  pantoprazole  Injectable 40 milliGRAM(s) IV Push daily  ranolazine 500 milliGRAM(s) Oral two times a day  simbrinza 1 Drop(s) 1 Drop(s) Right EYE two times a day  tiotropium 18 MICROgram(s) Capsule 1 Capsule(s) Inhalation daily    MEDICATIONS  (PRN):  HYDROmorphone  Injectable 0.2 milliGRAM(s) IV Push every 4 hours PRN shortness of breath         Vitals log        ICU Vital Signs Last 24 Hrs  T(C): 36.6 (19 Aug 2020 07:44), Max: 36.6 (18 Aug 2020 23:49)  T(F): 97.8 (19 Aug 2020 07:44), Max: 97.9 (18 Aug 2020 23:49)  HR: 116 (19 Aug 2020 07:44) (77 - 116)  BP: 97/67 (19 Aug 2020 07:44) (95/61 - 105/67)  BP(mean): --  ABP: --  ABP(mean): --  RR: 22 (19 Aug 2020 07:44) (18 - 22)  SpO2: 93% (19 Aug 2020 07:44) (93% - 100%)           Input and Output:  I&O's Detail    18 Aug 2020 07:01  -  19 Aug 2020 07:00  --------------------------------------------------------  IN:    Oral Fluid: 420 mL  Total IN: 420 mL    OUT:    Incontinent per Condom Catheter: 600 mL    Voided: 300 mL  Total OUT: 900 mL    Total NET: -480 mL          Lab Data                  Review of Systems	      Objective     Physical Examination    heart s1s2  lung dec BS  abd soft      Pertinent Lab findings & Imaging      Destini:  NO   Adequate UO     I&O's Detail    18 Aug 2020 07:01  -  19 Aug 2020 07:00  --------------------------------------------------------  IN:    Oral Fluid: 420 mL  Total IN: 420 mL    OUT:    Incontinent per Condom Catheter: 600 mL    Voided: 300 mL  Total OUT: 900 mL    Total NET: -480 mL               Discussed with:     Cultures:	        Radiology

## 2020-08-19 NOTE — PROGRESS NOTE ADULT - ATTENDING COMMENTS
I personally saw and examined the patient in detail.  I have spoken to the above provider regarding the assessment and plan of care.  I reviewed the above assessment and plan of care, and agree.  I have made changes in the body of the note where appropriate.  Patient with severe pulmonary hypertension and right heart failure requiring IV Lasix for increased diuresis.  Acute blood loss anemia requiring pRBC transfusion.  Continue to trend H/H.  Transfuse as needed.  Will need additional IV Lasix with each unit transfused, between each half unit.
I saw and examined the patient personally. Spoke with above provider regarding this case. I reviewed the above findings completely.  I agree with the above history, physical, and plan which I have edited where appropriate.
Advanced care planning was discussed with patient and family.  Advanced care planning forms were reviewed and discussed.  Risks, benefits and alternatives of gastroenterologic procedures were discussed in detail and all questions were answered.    30 minutes spent.
I personally conducted a physical examination of the patient. Agree with plan as above.  I edited the above listed findings which were prepared by the listed resident physician. I personally discussed the plan of care with the patient. The questions and concerns were addressed to the best of my ability. The patient is in agreement with the listed treatment plan. Plan for discharge home with home hospice. D/w daughter at bedside at length.
I personally conducted a physical examination of the patient. I personally gathered the patient's history. I edited the above listed findings which were prepared by the listed resident physician. I personally discussed the plan of care with the patient. The questions and concerns were addressed to the best of my ability. The patient is in agreement with the listed treatment plan. No plan for EGD per GI. Monitor h/h. On IV Lasix. Monitor renal function. On home O2 3L via NC for chronic respiratory failure and COPD. Prognosis is guarded. Palliative care for MOLST discussion. PT evaluation.
I personally conducted a physical examination of the patient. I personally gathered the patient's history. I edited the above listed findings which were prepared by the listed resident physician. I personally discussed the plan of care with the patient. The questions and concerns were addressed to the best of my ability. The patient is in agreement with the listed treatment plan.  Prognosis is poor. Palliative care consult appreciated. On Comfort measures. Hospice evaluation for home hospice requested.

## 2020-08-25 PROCEDURE — 93224 XTRNL ECG REC UP TO 48 HRS: CPT

## 2020-10-01 ENCOUNTER — APPOINTMENT (OUTPATIENT)
Dept: CARDIOLOGY | Facility: CLINIC | Age: 85
End: 2020-10-01

## 2020-12-20 NOTE — PROGRESS NOTE ADULT - PROBLEM/PLAN-5
DISPLAY PLAN FREE TEXT
 used

## 2021-01-08 NOTE — ED ADULT NURSE NOTE - DRUG PRE-SCREENING (DAST -1)
Occupational Therapy    Visit Type: treatment  Precautions: tracheostomy Born at Gestational Age: 37w4d and now corrected gestational age 46w 6d  Nursing comments: Per RN, infant tolerated first trach change well.    SUBJECTIVE                                                                                                                 Per RN permission, infant seen for OT tx session following first trach change. No caregivers present.      PPE: gloves, surgical mask, protective eye wear        Pain   RN informed on pain level  Face, Legs, Activity, Cry, Consolability Scale (FLACC)     Face: 0 - No particular expression or smile    Legs: 0 - Normal position or relaxed    Activity: 0 - Lying quietly, normal position, moves easily    Cry: 0 - No cry (awake or asleep)    Consolability: 0 - Content, relaxed    Score: 0      OBJECTIVE                                                                                                              Autonomic Stability:    Heart Rate: stable    Respiratory: stable    Oxygen Saturations: stable    Bed Type: open crib  Respiratory Support: ventilator trach  Current Positioning Device: Bendy, large and Swaddle    State: dozing, eyes opening/closing    Neurobehavioral:    Touch response: adapts with containment/therapeutic touch    Infant stress cues:      -motor: poor quality of movement      -behavioral: grimace    Self regulation:       -achieved with assistance of: containment, boundaries, swaddle and therapeutic pauses      Range of Motion (noted in % unless otherwise indicated, active reported unless noted):  Comments / Details: Stiffness noted in B UE's with ROM limited in hands. B thumbs adducted toward palm of hand with ulnar sway of digits. Increased stiffness in R hand versus L hand today. Able to range B hands to neutral while maintaining vitals and limited stress signs.              Interventions                                                                                                               Treatment Provided:  - State regulation:  2 point containment, calming strategies    ROM/Positioning:  ROM    Neuroprotective Care  two person care, hand holding/finger grasping, containment, LE boundaries, soft voicing2    Parent/Caregiver HEP/Education: Parent unavailable for education. and Teaching completed with RN through verbal instruction/explanation.  Emphasis of teaching included Current neuromotor and neurobehavioral status of infant.  Response to teaching Demonstrated teach back through verbal.        ASSESSMENT                                                                                                            - Impairments: abnormal tone, strength, tolerance to handling, state regulation, activity tolerance, range of motion and movement quality  - Functional Limitations: self regulation, poor functional mobility and caregiver bonding  Infant seen for OT tx session following first trach change on 1/6. Infant received following RN assessment lying semi-inclined supine in an open crib unswaddled in a drowsy state. Infant tolerated PROM to B UEs and LEs. Stiffness noted in B UEs with R UE stiffer than L UE, however, infant able to maintain vitals throughout and demonstrate minimal stress signs. Please see objective section for details regarding positioning of hands. OT plans to splint B hands next week to encourage proper positioning of hands. Minimal active movements noted this session, however, UE movement greater than LE movement. Infant transitioned to supported sit and tolerated this position well. [place] A to bring UE to midline and mouth this session. Infant maintained drowsy state throughout session, however, RN reports infant is still on sedation but they are weaning. Infant transitioned back to semi-inclined supine, loosely swaddled with B UEs out to encourage movement and large bendy provided for distal boundaries. Overall, infant tolerated P/AAROM and  transition to supported sit, however, demonstrates generalized weakness, decreased active movements, decreased state regulation, and limited activity tolerance.     Continue to utilize a low stim environment, swaddle and boundaries to support a calm state.     Discharge Recommendations:  OT Referrals/Discharge Recommendations: Refer to NICU follow up clinic, Refer to early intervention               End of Session:  Location: open crib  Safety measures: lines intact and infant safety band  Handoff to: nurse    PLAN                                                                                                                          Suggestions for next session as indicated: OT Frequency: 2-3x/week  Interventions: continued evaluation, handling, positioning, state regulation, caregiver training, therapeutic activity and therapeutic exercise        GOALS    Long Term Goals: (to be met by time of discharge from hospital)      1. Caregiver education on infant stress cues,  Positioning, handling, environmental modifications, and strategies to promote state/self regulation and sensorimotor development.   2. Caregiver education on PROM to B hands.   3. Infant will transition to and sustain an alert state for at least 5 minutes with supports as needed to promote caregiver bonding/engagement.   4. Ongoing OT assessment    Documented in the chart in the following areas: Assessment.       Statement Selected

## 2021-09-01 NOTE — CONSULT NOTE ADULT - PROBLEM SELECTOR PROBLEM 5
CAD, multiple vessel
- If pt is lethargic, consider lowering Klonopin QHS from his home dose of 0.5mg to 0.25mg  - May start Remeron 7.5mg QHS for sleep and appetite if family in agreement
  - May start Remeron 7.5mg QHS for sleep, mood,  and appetite if family in agreement

## 2022-07-05 NOTE — CONSULT NOTE ADULT - CONVERSATION DETAILS
[facility] 75  coding opportunities       Chart reviewed, no opportunity found:   [address]        Patients Insurance     Medicare Insurance: [employer] Ad[employer]age
Writer met with patient and daughter Ofelia at bedside and again separately with both daughters (Ofelia & Daphnie). Reviewed patient's medical and social history as well as events leading to patient's hospitalization. Writer discussed patient's current diagnosis (anemia, severe pul HTN, COPD), medical condition and management,  poor prognosis, and life expectancy (< 6 months if medical condition follows natural course). Inquired about patient's wishes regarding extent of medical care to be provided including escalation of medical care into the ICU and use of vasopressor support. In addition, the writer inquired about thoughts regarding cardiopulmonary resuscitation, artificial nutrition and hydration including use of feeding tubes and IVF, antibiotics, and further investigative studies such as blood draws and radiology. Patient and daughters showed good insight into medical condition. All questions answered. Patient stated that he lived a good life and if his heart and medical condition are worsening than he's at peace with dying. He asked to reduce suffering and to go home. Pt declined going to rehab on discharge. Daughters agree. Writer recommended DNR/DNI status and home hospice. All in agreement, Pt and daughters also requested no feeding tubes but trial of antibiotics. Writer also recommended no IVF, no rehospitalization, no labs, and focus on comfort directed care. Recommended starting opioids for management of SOB. All in agreement. MOLST form filled out and placed in chart. Daughters were grieving appropriately. Psychosocial support provided.

## 2022-11-04 NOTE — PHYSICAL EXAM
Group Therapy Documentation     Was the patient seen in-person or via Telemedicine (if in-person skip to Group Note): in-person            GROUP NOTE:  DATE OF SERVICE:  November 3, 2022    START TIME:  5:30pm    END TIME:  7:12pm    Group Length:   102 minutes    FACILITATOR(S):    Jeni BRIZUELA CGC    TOPIC: BEH Group Therapy, Problem Gambling Group     Number of patients attending the group: 5    Group Therapy Type:  Problem Gambling Group    Group Attendance:  Client attended group     Summary of Group / Topics Discussed: Group started with the introduction of a new group member. Each group member introduced themselves and shared something they have learned in group and shared there gambling of choice. Writer shared with group questions that are used in presentation to [facility] and the group discussed their own answers. Group member took turns with check in and identifying two feelings. Group ended with a reading of a mindfulness activity.      Patient's response to the group topic/interactions:    Patient appeared to connect with other group members.       Client specific details:     Patient was an active participant to group. Patient shared with writer that he enjoyed having group in-person and would like to continue with individual sessions in-person.         Jeni BRIZUELA CGC   [Normal] : affect was normal and insight and judgment were intact [de-identified] : see description in ROS

## 2022-11-28 NOTE — ED ADULT NURSE NOTE - HIV OFFER
[address] [address]          Pt Name: Thelma Goldsmith  MRN: [id number]  [place] 1987  Date of evaluation: 11/27/2022  Treating Resident Physician: Zoya Tyler DO  Supervising Physician: Jose Arias MD    History obtained from the patient. CHIEF COMPLAINT       Chief Complaint   Patient presents with    Leg Injury     left           HISTORY OF PRESENT ILLNESS    HPI  Thelma Goldsmith is a 28 y.o. female who presents to the emergency department for evaluation of LLE injury, onset less than 1 hour PTA. Was walking down the stairs and she slipped and slid down 4 steps striking her left heel. Patient felt immediate pain and note she has a deformity to her lower leg. She not hit her head or pass out. She has no other complaints. She has never had surgery on this leg. The patient has no other acute complaints at this time. REVIEW OF SYSTEMS   Review of Systems   Constitutional:  Negative for chills and fever. HENT:  Negative for congestion and rhinorrhea. Eyes:  Negative for pain and redness. Respiratory:  Negative for shortness of breath and wheezing. Cardiovascular:  Negative for chest pain and palpitations. Gastrointestinal:  Positive for nausea. Negative for diarrhea and vomiting. Genitourinary:  Negative for difficulty urinating and dysuria. Musculoskeletal:  Positive for gait problem and myalgias. Skin:  Positive for wound. Negative for color change and pallor. Neurological:  Negative for facial asymmetry and speech difficulty. Psychiatric/Behavioral:  The patient is nervous/anxious. PAST MEDICAL AND SURGICAL HISTORY     Past Medical History:   Diagnosis Date    Narcolepsy      No past surgical history on file.       MEDICATIONS     Current Facility-Administered Medications:     lidocaine PF 1 % injection 20 mL, 20 mL, IntraDERmal, Once, Zoya Tyler DO  No current outpatient medications on file.      SOCIAL HISTORY     Social History     Social History Narrative    Not on file            ALLERGIES   No Known Allergies      FAMILY HISTORY   No family history on file. PREVIOUS RECORDS   Previous records reviewed: This is this patient's first visit to [facility] ED, no previous records available on EMR. .        PHYSICAL EXAM     ED Triage Vitals [11/27/22 1917]   BP Temp Temp Source Heart Rate Resp SpO2 Height Weight   131/89 98.7 °F (37.1 °C) Oral (!) 122 22 100 % 5' 7\" (1.702 m) 125 lb (56.7 kg)     Initial vital signs and nursing assessment reviewed and abnormal from heart rate 122 . Body mass index is 19.58 kg/m². Pulsoximetry is normal per my interpretation. Additional Vital Signs:  Vitals:    11/27/22 2250   BP:    Pulse:    Resp:    Temp:    SpO2: 100%       Physical Exam  Vitals and nursing note reviewed. Constitutional:       General: She is not in acute distress. Appearance: She is not ill-appearing or toxic-appearing. HENT:      Head: Normocephalic and atraumatic. Right Ear: External ear normal.      Left Ear: External ear normal.      Nose: Nose normal. No congestion. Mouth/Throat:      Mouth: Mucous membranes are moist.      Pharynx: Oropharynx is clear. Eyes:      Conjunctiva/sclera: Conjunctivae normal.   Cardiovascular:      Rate and Rhythm: Normal rate and regular rhythm. Pulses: Normal pulses. Heart sounds: Normal heart sounds. Pulmonary:      Effort: Pulmonary effort is normal. No respiratory distress. Breath sounds: Normal breath sounds. No wheezing. Abdominal:      General: There is no distension. Palpations: Abdomen is soft. Tenderness: There is no abdominal tenderness. There is no guarding. Musculoskeletal:         General: Swelling, tenderness and deformity present. Cervical back: Normal range of motion and neck supple. No tenderness.       Comments: Apparent closed deformity to the left lower extremity with swelling and tenderness over the area. Lymphadenopathy:      Cervical: No cervical adenopathy. Skin:     General: Skin is warm and dry. Capillary Refill: Capillary refill takes less than 2 seconds. Neurological:      General: No focal deficit present. Mental Status: She is alert and oriented to person, place, and time. Sensory: No sensory deficit. Motor: No weakness. Psychiatric:         Mood and Affect: Mood normal.           MEDICAL DECISION MAKING   Initial Assessment:   Patient is a nontoxic, previously 80-year-old female who presents in an immense amount of pain from EMS with a deformed left lower extremity. Pulses are intact and compartments are soft. Her left distal tibia and fibular region appears swollen with some ecchymosis, but there is no open fracture. Patient received fentanyl prior to arrival, we gave her morphine and Ativan which hardly touched her pain. X-rays revealed a spiral fracture of the tibia and fibula. Ortho was consulted they came in. A splint was placed. They plan on doing surgery on 11/28/2022, but do not think she needs emergent surgery now. Patient was tachycardic upon arrival, with thinks most likely secondary to pain. Her vitals are otherwise normal.  We discussed with her and she is comfortable going home with some Norco and Percocet to hold her over until she has surgery. Ddx: L tibia fx, L fibula fx, compartment syndrome, ankle sprain, ankle ligament injury  Plan:   Morphine. Ativan  Ortho consult  XR L tib/fib  Dilaudid  Splint  Repeat XR L tib/fib  Rx Norco and Percocet          ED RESULTS   Laboratory results:  Labs Reviewed   CBC WITH AUTO DIFFERENTIAL   COMPREHENSIVE METABOLIC PANEL   PROTIME-INR   GLOMERULAR FILTRATION RATE, ESTIMATED       Radiologic studies results:  XR TIBIA FIBULA LEFT (2 VIEWS)   Final Result   Impression:   1. No significant change in alignment of fractures in the distal tibia and    distal fibula.       This document has been electronically signed by: Cheri Palma DO on    11/27/2022 11:22 PM      XR TIBIA FIBULA LEFT (2 VIEWS)   Final Result      Fractures of the distal fibula and tibia. Final report electronically signed by Dr. Horace Leonard on 11/27/2022 8:30 PM      XR CHEST PORTABLE   Final Result      No acute intrathoracic process. **This report has been created using voice recognition software. It may contain minor errors which are inherent in voice recognition technology. **      Final report electronically signed by Dr. Horace Leonard on 11/27/2022 8:27 PM          ED Medications administered this visit:   Medications   lidocaine PF 1 % injection 20 mL (20 mLs IntraDERmal Not Given 11/27/22 2220)   morphine injection 4 mg (4 mg IntraVENous Given 11/27/22 1943)   LORazepam (ATIVAN) tablet 0.5 mg (0.5 mg Oral Given 11/27/22 1947)   morphine (PF) injection 2 mg (2 mg IntraVENous Given 11/27/22 2005)   HYDROmorphone (DILAUDID) injection 1 mg (1 mg IntraVENous Given 11/27/22 2132)   ondansetron (ZOFRAN) injection 4 mg (4 mg IntraVENous Given 11/27/22 2238)   HYDROcodone-acetaminophen (NORCO) 5-325 MG per tablet 1 tablet (1 tablet Oral Given 11/27/22 2340)   HYDROcodone-acetaminophen (NORCO) 5-325 MG per tablet 1 tablet (1 tablet Oral Given 11/27/22 2340)   oxyCODONE-acetaminophen (PERCOCET) 5-325 MG per tablet 2 tablet (2 tablets Oral Given 11/28/22 0004)         ED COURSE          Strict return precautions and follow up instructions were discussed with the patient prior to discharge, with which the patient agrees. MEDICATION CHANGES     There are no discharge medications for this patient. FINAL DISPOSITION     Final diagnoses:   Closed fracture of left tibia and fibula, initial encounter     Condition: condition: poor  Dispo: Discharge to home      This transcription was electronically signed.  Parts of this transcriptions may have been dictated by use of voice recognition software and Opt out

## 2023-01-25 NOTE — PATIENT PROFILE ADULT - DO YOU FEEL THREATENED BY OTHERS?
Monitorización de la presión sanguínea en casa  Debe tomarse la presión sanguínea en casa lexus vez al día  Las presiones sanguíneas de rango normal odell el período posparto son menos de 140 para el número superior (sistólica) y menos de 80 para el número inferior (diastólica)    Las presiones sanguíneas de rango ligeramente elevado odell el período posparto son 141-094 sistólica o  diastólica    La presión sanguínea de rango severo odell el período posparto es de 073 o más systólica o de 881 o más diastólica    Si pagan presión sanguínea es superior a 160/110, debe repetir pagan presión sanguínea en 15 minutos y llamar de inmediato a [address] [phone]  Los síntomas del empeoramiento de la preeclampsia incluyen dolor de pastor que no desaparece con Tylenol, cambios en la visión guy visión borrosa o manchas en la visión, dolor en el pecho, dificultad para respirar, dolor en la parte superior y derecha del abdomen o aparición o empeoramiento repentino de hinchazón  Si presenta alguno de los síntomas anteriores, debe llamar de inmediato a [facility] [phone] y presentarse al hospital para lexus evaluación de empeoramiento de la preeclampsia  no

## 2023-12-31 PROBLEM — Z23 NEED FOR VACCINATION WITH 13-POLYVALENT PNEUMOCOCCAL CONJUGATE VACCINE: Status: ACTIVE | Noted: 2018-03-14

## 2024-01-01 NOTE — ED ADULT NURSE NOTE - NS ED NOTE ABUSE SUSPICION NEGLECT YN
Initial breastfeeding was delayed and not stared within first hour of birth as mom was in OR until 0021 AM. Breastfeeding initiatiated at 0030   No

## 2024-05-01 NOTE — DIETITIAN INITIAL EVALUATION ADULT. - DIET TYPE
Medication Question or Refill    Contacts         Type Contact Phone/Fax    05/01/2024 04:33 PM CDT Phone (Incoming) Daniel Grayson (Self) [phone] (M)            What medication are you calling about (include dose and sig)?:   sildenafil (VIAGRA) 100 MG tablet 9 tablet   ezetimibe (ZETIA) 10 MG tablet 90 tablet  lisinopril (ZESTRIL) 20 MG tablet 90 tablet    Preferred Pharmacy:   [facility] [address] [place] [address]  [address] [place] [address]  Phone: [phone] Fax: [phone]      Controlled Substance Agreement on file:   CSA -- Patient Level:    CSA: None found at the patient level.       Who prescribed the medication?: Juan Aquino    Do you need a refill? Yes    When did you use the medication last? 4/30/24      Could we send this information to you in [facility] or would you prefer to receive a phone call?:  yes  [phone]           ensrue enlive BID/low sodium/supplement (specify)/dysphagia 2, mechanical soft, thin liquids

## 2024-05-10 NOTE — ED ADULT NURSE NOTE - RESPIRATION RHYTHM, QM
[FreeTextEntry1] : 14 yo teen boy with intermittent migraine headaches with visual aura, responsive to ibuprofen.  Normal neurological exam.
regular

## 2024-12-24 NOTE — PROGRESS NOTE ADULT - PROBLEM SELECTOR PROBLEM 3
Recent labs reviewed. Pt tolerated Folfirinox chemo tx well without any complications. ZAIDA connected to pt without incident. Education provided to pt.     Joseph Velasco is aware of future appt on 12/26/24 at 12PM.     AVS printed and given to Joseph Velasco.    Pt discharged off unit in stable condition with all personal belongings accompanied by wife.       LANE (acute kidney injury)

## 2025-02-12 NOTE — CONSULT NOTE ADULT - PARTICIPANTS
Patient/Family [General Appearance - Alert] : alert [General Appearance - In No Acute Distress] : in no acute distress [General Appearance - Well Nourished] : well nourished [General Appearance - Well Developed] : well developed [General Appearance - Well-Appearing] : healthy appearing [Sclera] : the sclera and conjunctiva were normal [PERRL With Normal Accommodation] : pupils were equal in size, round, and reactive to light [Extraocular Movements] : extraocular movements were intact [Outer Ear] : the ears and nose were normal in appearance [Oropharynx] : the oropharynx was normal [Neck Appearance] : the appearance of the neck was normal [Neck Cervical Mass (___cm)] : no neck mass was observed [Jugular Venous Distention Increased] : there was no jugular-venous distention [Thyroid Diffuse Enlargement] : the thyroid was not enlarged [Lungs Percussion] : the lungs were normal to percussion [Heart Rate And Rhythm] : heart rate was normal and rhythm regular [Edema] : there was no peripheral edema [Abdomen Soft] : soft [Abdomen Tenderness] : non-tender [Abdomen Mass (___ Cm)] : no abdominal mass palpated [Cervical Lymph Nodes Enlarged Posterior Bilaterally] : posterior cervical [Cervical Lymph Nodes Enlarged Anterior Bilaterally] : anterior cervical [Supraclavicular Lymph Nodes Enlarged Bilaterally] : supraclavicular [Axillary Lymph Nodes Enlarged Bilaterally] : axillary [No CVA Tenderness] : no ~M costovertebral angle tenderness [No Spinal Tenderness] : no spinal tenderness [Skin Color & Pigmentation] : normal skin color and pigmentation [Skin Turgor] : normal skin turgor [] : no rash [Cranial Nerves] : cranial nerves 2-12 were intact [Sensation] : the sensory exam was normal to light touch and pinprick [Motor Exam] : the motor exam was normal [No Focal Deficits] : no focal deficits [Oriented To Time, Place, And Person] : oriented to person, place, and time [Impaired Insight] : insight and judgment were intact [Affect] : the affect was normal [Mood] : the mood was normal [FreeTextEntry1] : Strength 5/5